# Patient Record
Sex: MALE | Race: BLACK OR AFRICAN AMERICAN | NOT HISPANIC OR LATINO | Employment: FULL TIME | URBAN - METROPOLITAN AREA
[De-identification: names, ages, dates, MRNs, and addresses within clinical notes are randomized per-mention and may not be internally consistent; named-entity substitution may affect disease eponyms.]

---

## 2017-01-31 ENCOUNTER — OFFICE VISIT (OUTPATIENT)
Dept: FAMILY MEDICINE | Facility: CLINIC | Age: 47
End: 2017-01-31

## 2017-01-31 VITALS
HEART RATE: 94 BPM | WEIGHT: 166 LBS | RESPIRATION RATE: 18 BRPM | SYSTOLIC BLOOD PRESSURE: 134 MMHG | BODY MASS INDEX: 23.16 KG/M2 | DIASTOLIC BLOOD PRESSURE: 88 MMHG

## 2017-01-31 DIAGNOSIS — R21 RASH AND NONSPECIFIC SKIN ERUPTION: ICD-10-CM

## 2017-01-31 DIAGNOSIS — E11.9 DM TYPE 2, GOAL HBA1C 7%-8% (H): ICD-10-CM

## 2017-01-31 DIAGNOSIS — L08.9 LOCAL INFECTION OF SKIN AND SUBCUTANEOUS TISSUE: ICD-10-CM

## 2017-01-31 DIAGNOSIS — E11.9 DM TYPE 2, GOAL HBA1C 7%-8% (H): Primary | ICD-10-CM

## 2017-01-31 LAB
ALBUMIN SERPL-MCNC: 3.4 G/DL (ref 3.4–5)
ALP SERPL-CCNC: 99 U/L (ref 40–150)
ALT SERPL W P-5'-P-CCNC: 18 U/L (ref 0–70)
ANION GAP SERPL CALCULATED.3IONS-SCNC: 8 MMOL/L (ref 3–14)
AST SERPL W P-5'-P-CCNC: 10 U/L (ref 0–45)
BASOPHILS # BLD AUTO: 0.1 10E9/L (ref 0–0.2)
BASOPHILS NFR BLD AUTO: 0.7 %
BILIRUB SERPL-MCNC: 0.5 MG/DL (ref 0.2–1.3)
BUN SERPL-MCNC: 15 MG/DL (ref 7–30)
CALCIUM SERPL-MCNC: 8.6 MG/DL (ref 8.5–10.1)
CHLORIDE SERPL-SCNC: 104 MMOL/L (ref 94–109)
CHOLEST SERPL-MCNC: 133 MG/DL
CO2 SERPL-SCNC: 26 MMOL/L (ref 20–32)
CREAT SERPL-MCNC: 0.83 MG/DL (ref 0.66–1.25)
CREAT UR-MCNC: 84 MG/DL
DIFFERENTIAL METHOD BLD: NORMAL
EOSINOPHIL # BLD AUTO: 0.1 10E9/L (ref 0–0.7)
EOSINOPHIL NFR BLD AUTO: 1.1 %
ERYTHROCYTE [DISTWIDTH] IN BLOOD BY AUTOMATED COUNT: 11.7 % (ref 10–15)
GFR SERPL CREATININE-BSD FRML MDRD: ABNORMAL ML/MIN/1.7M2
GLUCOSE SERPL-MCNC: 347 MG/DL (ref 70–99)
HBA1C MFR BLD: 10.2 % (ref 4.3–6)
HCT VFR BLD AUTO: 46.8 % (ref 40–53)
HDLC SERPL-MCNC: 34 MG/DL
HGB BLD-MCNC: 16.1 G/DL (ref 13.3–17.7)
IMM GRANULOCYTES # BLD: 0 10E9/L (ref 0–0.4)
IMM GRANULOCYTES NFR BLD: 0.2 %
LDLC SERPL CALC-MCNC: 68 MG/DL
LYMPHOCYTES # BLD AUTO: 1.8 10E9/L (ref 0.8–5.3)
LYMPHOCYTES NFR BLD AUTO: 20.2 %
MCH RBC QN AUTO: 30.8 PG (ref 26.5–33)
MCHC RBC AUTO-ENTMCNC: 34.4 G/DL (ref 31.5–36.5)
MCV RBC AUTO: 90 FL (ref 78–100)
MICROALBUMIN UR-MCNC: <5 MG/L
MICROALBUMIN/CREAT UR: NORMAL MG/G CR (ref 0–17)
MONOCYTES # BLD AUTO: 0.8 10E9/L (ref 0–1.3)
MONOCYTES NFR BLD AUTO: 9.2 %
NEUTROPHILS # BLD AUTO: 6.1 10E9/L (ref 1.6–8.3)
NEUTROPHILS NFR BLD AUTO: 68.6 %
NONHDLC SERPL-MCNC: 99 MG/DL
NRBC # BLD AUTO: 0 10*3/UL
NRBC BLD AUTO-RTO: 0 /100
PLATELET # BLD AUTO: 275 10E9/L (ref 150–450)
POTASSIUM SERPL-SCNC: 4 MMOL/L (ref 3.4–5.3)
PROT SERPL-MCNC: 7.4 G/DL (ref 6.8–8.8)
RBC # BLD AUTO: 5.22 10E12/L (ref 4.4–5.9)
SODIUM SERPL-SCNC: 138 MMOL/L (ref 133–144)
TRIGL SERPL-MCNC: 152 MG/DL
WBC # BLD AUTO: 8.9 10E9/L (ref 4–11)

## 2017-01-31 RX ORDER — CEPHALEXIN 500 MG/1
500 CAPSULE ORAL 3 TIMES DAILY
Qty: 30 CAPSULE | Refills: 0 | Status: SHIPPED | OUTPATIENT
Start: 2017-01-31 | End: 2017-02-28

## 2017-01-31 RX ORDER — CLOTRIMAZOLE AND BETAMETHASONE DIPROPIONATE 10; .64 MG/G; MG/G
CREAM TOPICAL 2 TIMES DAILY
Qty: 45 G | Refills: 1 | Status: SHIPPED | OUTPATIENT
Start: 2017-01-31 | End: 2020-12-29

## 2017-01-31 ASSESSMENT — PAIN SCALES - GENERAL: PAINLEVEL: NO PAIN (0)

## 2017-01-31 NOTE — MR AVS SNAPSHOT
After Visit Summary   1/31/2017    Otto Hughes    MRN: [id number]           Patient Information     Date Of Birth          1970        Visit Information        Provider Department      1/31/2017 9:30 AM Antelmo Carrera MD [facility]        Today's Diagnoses     DM type 2, goal HbA1c 7%-8% (H)    -  1     Local infection of skin and subcutaneous tissue         Rash and nonspecific skin eruption           Care Instructions    Primary Care Center Medication Refill Request Information:  * Please contact your pharmacy regarding ANY request for medication refills.  ** [facility] Prescription Fax = [phone]  * Please allow 3 business days for routine medication refills.  * Please allow 5 business days for controlled substance medication refills.     Primary Care Center Test Result notification information:  *You will be notified with in 7-10 days of your appointment day regarding the results of your test.  If you are on MyChart you will be notified as soon as the provider has reviewed the results and signed off on them.          Follow-ups after your visit        Additional Services     DERMATOLOGY REFERRAL       Your provider has referred you to: [facility] [place]: Dermatology Clinic [facility] [phone]   [email or web address]    Please be aware that coverage of these services is subject to the terms and limitations of your health insurance plan.  Call member services at your health plan with any benefit or coverage questions.      Please bring the following with you to your appointment:    (1) Any X-Rays, CTs or MRIs which have been performed.  Contact the facility where they were done to arrange for  prior to your scheduled appointment.    (2) List of current medications  (3) This referral request   (4) Any documents/labs given to you for this referral                  Your next 10 appointments already scheduled     Jan 31, 2017 10:30 AM   LAB  with  LAB   [facility] ([facility])    [address] [place] [address] [phone]           Patient must bring picture ID.  Patient should be prepared to give a urine specimen  Please do not eat 10-12 hours before your appointment if you are coming in fasting for labs on lipids, cholesterol, or glucose (sugar).  Pregnant women should follow their Care Team instructions. Water with medications is okay. Do not drink coffee or other fluids.   If you have concerns about taking  your medications, please ask at office or if scheduling via Cortex[employer], send a message by clicking on Secure Messaging, Message Your Care Team.            Feb 28, 2017  8:45 AM   (Arrive by 8:30 AM)   New Patient Visit with Sujit Kerr MD   [facility] Dermatology ([facility])    [address] [place] [address] [phone]              Future tests that were ordered for you today     Open Future Orders        Priority Expected Expires Ordered    HEMOGLOBIN A1C -(Today) Routine 1/31/2017 1/31/2018 1/31/2017    CBC with platelets differential Routine  1/31/2018 1/31/2017    Comprehensive metabolic panel Routine  1/31/2018 1/31/2017    Lipid panel reflex to direct LDL Routine  1/31/2018 1/31/2017            Who to contact     Please call your clinic at [phone] to:    Ask questions about your health    Make or cancel appointments    Discuss your medicines    Learn about your test results    Speak to your doctor   If you have compliments or concerns about an experience at your clinic, or if you wish to file a complaint, please contact [facility] Patient Relations at [phone] or email us at [email or web address]         Additional Information About Your Visit        [email or web address] Information     Cortexyme is an electronic gateway that provides easy, online access to your medical records. With Cortexyme,  you can request a clinic appointment, read your test results, renew a prescription or communicate with your care team.     To sign up for [employer] visit the website at [email or web address]   You will be asked to enter the access code listed below, as well as some personal information. Please follow the directions to create your username and password.     Your access code is: [email or web address]  Expires: 2017  6:30 AM     Your access code will  in 90 days. If you need help or a new code, please contact your [facility] or call [phone] for assistance.        Care EveryWhere ID     This is your Care EveryWhere ID. This could be used by other organizations to access your [place] medical records  [id number]        Your Vitals Were     Pulse Respirations                94 18           Blood Pressure from Last 3 Encounters:   17 134/88   16 135/82   11/17/15 114/71    Weight from Last 3 Encounters:   17 75.297 kg (166 lb)   16 81.511 kg (179 lb 11.2 oz)   11/17/15 80.513 kg (177 lb 8 oz)              We Performed the Following     Albumin Random Urine Quantitative     DERMATOLOGY REFERRAL          Today's Medication Changes          These changes are accurate as of: 17 10:22 AM.  If you have any questions, ask your nurse or doctor.               Start taking these medicines.        Dose/Directions    cephALEXin 500 MG capsule   Commonly known as:  KEFLEX   Used for:  Local infection of skin and subcutaneous tissue   Started by:  Antelmo Carrera MD        Dose:  500 mg   Take 1 capsule (500 mg) by mouth 3 times daily   Quantity:  30 capsule   Refills:  0       clotrimazole-betamethasone cream   Commonly known as:  LOTRISONE   Used for:  Rash and nonspecific skin eruption   Started by:  Antelmo Carrera MD        Apply topically 2 times daily Apply to rash behind knees   Quantity:  45 g   Refills:  1            Where to get your medicines       These medications were sent to [facility] - [place], [place] - [address]  [address] [facility] [address]     Phone:  [phone]    - cephALEXin 500 MG capsule  - clotrimazole-betamethasone cream             Primary Care Provider Office Phone # Fax #    Antelmo Carrera [phone]       PRIMARY CARE CENTER [address] [facility] [address]  [facility] [address]        Thank you!     Thank you for choosing [facility]  for your care. Our goal is always to provide you with excellent care. Hearing back from our patients is one way we can continue to improve our services. Please take a few minutes to complete the written survey that you may receive in the mail after your visit with us. Thank you!             Your Updated Medication List - Protect others around you: Learn how to safely use, store and throw away your medicines at www.disposemymeds.org.          This list is accurate as of: 1/31/17 10:22 AM.  Always use your most recent med list.                   Brand Name Dispense Instructions for use    ASPIRIN CHILDRENS 81 MG chewable tablet   Generic drug:  aspirin      Take 81 mg by mouth daily.       blood glucose monitoring lancets     1 Box    1 each by In Vitro route 3 times daily Use as directed       blood glucose monitoring meter device kit     1 kit    Use to test blood sugars daily as directed.       blood glucose monitoring test strip    no brand specified    100 each    Test blood 2-3 times daily.       cephALEXin 500 MG capsule    KEFLEX    30 capsule    Take 1 capsule (500 mg) by mouth 3 times daily       clotrimazole-betamethasone cream    LOTRISONE    45 g    Apply topically 2 times daily Apply to rash behind knees       insulin glargine 100 UNIT/ML injection    LANTUS    1 Month    Inject 10 Units Subcutaneous At Bedtime       insulin pen needle 31G X 8 MM     30 each    Use daily to inject Lantus. Please make ANNUAL exam with PRIMARY  provider for continued refills.       metFORMIN 500 MG 24 hr tablet    GLUCOPHAGE-XR    120 tablet    Take 4 tablets (2,000 mg) by mouth daily (with dinner)       sildenafil 100 MG cap/tab    VIAGRA    4 tablet    Take 0.5-1 tablets ( mg) by mouth daily as needed for erectile dysfunction Take 30 min to 4 hours before intercourse.  Never use with nitroglycerin, terazosin or doxazosin.       simvastatin 20 MG tablet    ZOCOR    90 tablet    Take 1 tablet (20 mg) by mouth At Bedtime . Patient needs to see primary provider and have labs for further refills.       tadalafil 20 MG tablet    CIALIS    12 tablet    Take 1 tablet (20 mg) by mouth daily as needed for erectile dysfunction

## 2017-01-31 NOTE — NURSING NOTE
Chief Complaint   Patient presents with     Rash     Patient is here for ongoing rash, duration 1 month     Sintia Burns CMA 9:46 AM on 1/31/2017.

## 2017-01-31 NOTE — Clinical Note
Patient:  Otto Hughes  :   1970  MRN:     [id number]        Mr. Otto Hughes  [address] [address]        2017    Dear Mr. Hughes,    Thank you for choosing the [facility] for your healthcare needs.  We appreciate the opportunity to serve you.    The following are your recent test results.     Labs show sugar worse; after see derm, see me back to discuss after.    Results for orders placed or performed in visit on 17   Albumin Random Urine Quantitative   Result Value Ref Range    Creatinine Urine 84 mg/dL    Albumin Urine mg/L <5 mg/L    Albumin Urine mg/g Cr Unable to calculate due to low value 0 - 17 mg/g Cr     Please contact your provider if you have any questions or concerns.  We look forward to serving your needs in the future.      Sincerely,    Dr. Carrera/brooke

## 2017-01-31 NOTE — PROGRESS NOTES
SUBJECTIVE:    Pt is a 46 year old male with pmh of     Patient Active Problem List   Diagnosis     Hyperlipidemia     T2DM (type 2 diabetes mellitus) (H)       who is here for evaluation of had concerns including Rash.    Here for a few things.  One, two types of rash, new in last mo or so.  1--upper thighs both legs red tender raised spots no drg no trauma  2--behind both knees itchy flaky red patch not warm/tender/draining or raised  Also notes darkening of groin skin    Two, DM, overdue for f/u. Does mention no insulin rxn/passing out as will need forms to drive soon.  Weight is down, he thinks did try to eat healthier but not much exercise.  Will need visit soon to focus on this we agree, last one last March so we agree March, then discuss feet/eye/dentist too    No Known Allergies        Current Outpatient Prescriptions   Medication Sig Dispense Refill     cephALEXin (KEFLEX) 500 MG capsule Take 1 capsule (500 mg) by mouth 3 times daily 30 capsule 0     clotrimazole-betamethasone (LOTRISONE) cream Apply topically 2 times daily Apply to rash behind knees 45 g 1     simvastatin (ZOCOR) 20 MG tablet Take 1 tablet (20 mg) by mouth At Bedtime . Patient needs to see primary provider and have labs for further refills. 90 tablet 0     blood glucose monitoring (NO BRAND SPECIFIED) test strip Test blood 2-3 times daily. 100 each 5     metFORMIN (GLUCOPHAGE-XR) 500 MG 24 hr tablet Take 4 tablets (2,000 mg) by mouth daily (with dinner) 120 tablet 5     blood glucose monitoring (FREESTYLE) lancets 1 each by In Vitro route 3 times daily Use as directed 1 Box 5     sildenafil (VIAGRA) 100 MG tablet Take 0.5-1 tablets ( mg) by mouth daily as needed for erectile dysfunction Take 30 min to 4 hours before intercourse.  Never use with nitroglycerin, terazosin or doxazosin. 4 tablet 6     tadalafil (CIALIS) 20 MG tablet Take 1 tablet (20 mg) by mouth daily as needed for erectile dysfunction 12 tablet 11     blood glucose  monitoring (FREESTYLE FREEDOM LITE) meter device kit Use to test blood sugars daily as directed. 1 kit 0     insulin glargine (LANTUS) 100 UNIT/ML vial Inject 10 Units Subcutaneous At Bedtime 1 Month 3     insulin pen needle 31G X 8 MM Use daily to inject Lantus. Please make ANNUAL exam with PRIMARY provider for continued refills. 30 each 5     aspirin (ASPIRIN CHILDRENS) 81 MG chewable tablet Take 81 mg by mouth daily.         Social History   Substance Use Topics     Smoking status: Former Smoker -- 0.50 packs/day for 17 years     Quit date: 01/01/2008     Smokeless tobacco: Never Used     Alcohol Use: No             OBJECTIVE:  /88 mmHg  Pulse 94  Resp 18  Wt 75.297 kg (166 lb)  GENERAL APPEARANCE: Alert, no acute distress  SKIN:   1--bilat dark skin both groins  2--upper bilat thighs four total one inch round red slight raised mild tender not warm circular lesions not fluctuant no open areas he states he tried to drain but could not   3--behind both knees four inch red flat flaky patch not warm/swollen/open/draining or tender  NEURO: Alert, oriented, speech and mentation normal  PSYCHE: mentation appears normal, affect and mood normal    ASSESSMENT/PLAN:    Upper thigh lesions likely folliculitis  Keflex if not better try doxy  Nothing to drain    Groin skin c/w acanthosis    DM: labs    Knee rash: likely fungus vs eczema  Lotrisone    See derm for opinion--all skin issues he just noted earlier this winter, new, no contacts    See me back in March recheck wt then wt loss could be from eating a bit less but check full lab set for today    MEAGHAN RUELAS MD

## 2017-01-31 NOTE — PATIENT INSTRUCTIONS
Primary Care Center Medication Refill Request Information:  * Please contact your pharmacy regarding ANY request for medication refills.  ** [facility] Prescription Fax = [phone]  * Please allow 3 business days for routine medication refills.  * Please allow 5 business days for controlled substance medication refills.     Primary Care Center Test Result notification information:  *You will be notified with in 7-10 days of your appointment day regarding the results of your test.  If you are on MyChart you will be notified as soon as the provider has reviewed the results and signed off on them.

## 2017-02-02 ENCOUNTER — TELEPHONE (OUTPATIENT)
Dept: INTERNAL MEDICINE | Facility: CLINIC | Age: 47
End: 2017-02-02

## 2017-02-02 NOTE — TELEPHONE ENCOUNTER
Left a detailed message to cell phone regarding the result and recommendations.  --------------------------------------------      ----- Message from Antelmo Carrera MD sent at 1/31/2017 12:21 PM CST -----  Let him know sugar worse; after sees derm, see me back to disccuss

## 2017-02-06 ENCOUNTER — TELEPHONE (OUTPATIENT)
Dept: INTERNAL MEDICINE | Facility: CLINIC | Age: 47
End: 2017-02-06

## 2017-02-10 ENCOUNTER — TELEPHONE (OUTPATIENT)
Dept: INTERNAL MEDICINE | Facility: CLINIC | Age: 47
End: 2017-02-10

## 2017-02-10 DIAGNOSIS — E11.9 DIABETES MELLITUS, TYPE 2 (H): Primary | ICD-10-CM

## 2017-02-10 NOTE — TELEPHONE ENCOUNTER
Spoke with pharmacy, states that pt picked up Accucheck Meliza glucometer and test strips on 1/6, pt cannot have the new one which won't be covered by the insurance. He needs to call the phone number on the box for replacement. Left a detailed message to the pt regarding this matter.  -------------------------------------------------        ----- Message from Boaz Trevizo sent at 2/10/2017 10:30 AM CST -----  Regarding: Dr Carrera - pt req. a call back re: test strips and meter not working  Contact: [phone]  Pt has not been able to check his blood glucose level due to his meter being broken for the past 3-4 days, pt stated the test strips are no longer covered under ins and is req a call back re: both issues. Pt can be reached at [phone] thanks

## 2017-02-10 NOTE — TELEPHONE ENCOUNTER
Pt called back, states that his can keep the old glucometer (free style), needs test strip for that glucometer.  Free style test strips were sent to the pharmacy.

## 2017-02-28 ENCOUNTER — TELEPHONE (OUTPATIENT)
Dept: PHARMACY | Facility: OTHER | Age: 47
End: 2017-02-28

## 2017-02-28 ENCOUNTER — OFFICE VISIT (OUTPATIENT)
Dept: DERMATOLOGY | Facility: CLINIC | Age: 47
End: 2017-02-28

## 2017-02-28 ENCOUNTER — OFFICE VISIT (OUTPATIENT)
Dept: FAMILY MEDICINE | Facility: CLINIC | Age: 47
End: 2017-02-28

## 2017-02-28 VITALS
SYSTOLIC BLOOD PRESSURE: 111 MMHG | DIASTOLIC BLOOD PRESSURE: 77 MMHG | WEIGHT: 164.7 LBS | BODY MASS INDEX: 22.97 KG/M2 | HEART RATE: 82 BPM

## 2017-02-28 DIAGNOSIS — E11.9 DM TYPE 2, GOAL HBA1C 7%-8% (H): Primary | ICD-10-CM

## 2017-02-28 DIAGNOSIS — L85.8 KP (KERATOSIS PILARIS): ICD-10-CM

## 2017-02-28 DIAGNOSIS — Z13.89 SCREENING FOR DIABETIC PERIPHERAL NEUROPATHY: ICD-10-CM

## 2017-02-28 DIAGNOSIS — L83 ACANTHOSIS NIGRICANS: ICD-10-CM

## 2017-02-28 DIAGNOSIS — L28.1 PRURIGO NODULARIS: ICD-10-CM

## 2017-02-28 DIAGNOSIS — Z13.5 SCREENING FOR DIABETIC RETINOPATHY: ICD-10-CM

## 2017-02-28 DIAGNOSIS — L20.81 ATOPIC NEURODERMATITIS: Primary | ICD-10-CM

## 2017-02-28 RX ORDER — CLOBETASOL PROPIONATE 0.5 MG/G
OINTMENT TOPICAL 2 TIMES DAILY
Qty: 60 G | Refills: 3 | Status: SHIPPED | OUTPATIENT
Start: 2017-02-28 | End: 2020-12-29

## 2017-02-28 RX ORDER — TRIAMCINOLONE ACETONIDE 1 MG/G
OINTMENT TOPICAL 2 TIMES DAILY
Qty: 454 G | Refills: 1 | Status: SHIPPED | OUTPATIENT
Start: 2017-02-28 | End: 2020-12-29

## 2017-02-28 ASSESSMENT — PAIN SCALES - GENERAL
PAINLEVEL: NO PAIN (0)
PAINLEVEL: NO PAIN (0)

## 2017-02-28 NOTE — NURSING NOTE
Dermatology Rooming Note    Otto Hughes's goals for this visit include:   Chief Complaint   Patient presents with     Derm Problem     Rash on legs. Otto was prescribed an oral antibiotic and also clotrimazole-betamethasone which helps, but the rash is still present.     Neela Rodriguez, CMA

## 2017-02-28 NOTE — MR AVS SNAPSHOT
After Visit Summary   2/28/2017    Otto Hughes    MRN: [id number]           Patient Information     Date Of Birth          1970        Visit Information        Provider Department      2/28/2017 8:45 AM Sujit Kerr MD [facility] Dermatology        Today's Diagnoses     Atopic neurodermatitis    -  1    KP (keratosis pilaris)        Acanthosis nigricans        Prurigo nodularis          Care Instructions    Recommendations for dry skin and dermatitis   1. Bathe or shower daily in lukewarm water  2. Use a gentle non-soap detergent cleanser  - Soaps are alkaline (which can irritate sensitive skin) and remove natural moisturizing factors   - Recommended products, in no particular order, include:   - Bars:    - Aveeno Moisturizing Bar    - Cetaphil Gentle Cleansing Bar    - Dove Sensitive Skin Unscented Beauty Bar    - Olay Ultra Moisture Bar   - Liquid Cleansers:    - Aquanil Cleanser    - CeraVe Hydrating Cleanser    - Cetaphil Gentle Skin Cleanser  - Avoid scented soaps or bath additives unless your doctor tells you otherwise  - Focus on washing the face, underarms, and underwear areas; other sites usually do not need frequent washing  3. Rinse off thoroughly, then pat dry until skin is slightly damp  4. Apply moisturizer to damp skin within 3-5 minutes of exiting the bath/shower  - Recommended products, in no particular order, include:   - Lotions (thinner/lighter, but may be less effective)    - AmLactin Cerapeutic Restoring Body Lotion    - CeraVe Facial Moisturizing Lotion (AM and/or PM)    - Lubriderm Advanced Therapy Lotion   - Creams (thicker, likely the best balance of effectiveness and feel)    - AmLactin Ultra Hydrating Body Cream    - Aveeno Eczema Therapy Moisturizing Cream    - Aveeno Eczema Therapy Itch Relief Balm    - CeraVe Itch Relief Moisturizing Cream   - Ointments (thickest)    - Vaseline  5. If prescribed a topical steroid medication, this may be applied before or after  the moisturizer (whichever order you prefer)  6. Reapply moisturizer one or two additional times throughout the day when dry skin is present; once this improves, reduce to daily or every other day as needed to prevent recurrence  7. If dry skin or dermatitis is present on the hands, keep moisturizer near the sink and apply after washing and drying your hands  8. A humidifier may be helpful during the winter months (when ambient humidity is very low)          Follow-ups after your visit        Follow-up notes from your care team     Return in about 3 months (around 5/28/2017).      Your next 10 appointments already scheduled     Mar 07, 2017  9:00 AM CST   TELEMEDICINE with Sourav Mosley [facility] Medication Therapy Management ([facility])    [address] [place] [id number]              Note: this is not an onsite visit; there is no need to come to the facility.              Who to contact     Please call your clinic at [phone] to:    Ask questions about your health    Make or cancel appointments    Discuss your medicines    Learn about your test results    Speak to your doctor   If you have compliments or concerns about an experience at your clinic, or if you wish to file a complaint, please contact [facility] Patient Relations at [phone] or email us at [email or web address]         Additional Information About Your Visit        Care EveryWhere ID     This is your Care EveryWhere ID. This could be used by other organizations to access your [place] medical records  [id number]         Blood Pressure from Last 3 Encounters:   02/28/17 111/77   01/31/17 134/88   03/03/16 135/82    Weight from Last 3 Encounters:   02/28/17 74.7 kg (164 lb 11.2 oz)   01/31/17 75.3 kg (166 lb)   03/03/16 81.5 kg (179 lb 11.2 oz)              Today, you had the following     No orders found for display         Today's  Medication Changes          These changes are accurate as of: 2/28/17 11:59 PM.  If you have any questions, ask your nurse or doctor.               Start taking these medicines.        Dose/Directions    clobetasol 0.05 % ointment   Commonly known as:  TEMOVATE   Used for:  Prurigo nodularis   Started by:  Sujit Kerr MD        Apply topically 2 times daily Apply to nodules that have been picked at and cover with bandaid. Avoid face, underarms, and groin folds.   Quantity:  60 g   Refills:  3       sitagliptin 50 MG tablet   Commonly known as:  JANUVIA   Used for:  DM type 2, goal HbA1c 7%-8% (H)   Started by:  Antelmo Carrera MD        Dose:  50 mg   Take 1 tablet (50 mg) by mouth daily   Quantity:  90 tablet   Refills:  3       triamcinolone 0.1 % ointment   Commonly known as:  KENALOG   Used for:  Atopic neurodermatitis   Started by:  Sujit Kerr MD        Apply topically 2 times daily To sites of dry, red, itchy skin.   Quantity:  454 g   Refills:  1            Where to get your medicines      These medications were sent to [facility] [place], [place] - [address] [facility]  [address] [id number]     Phone:  [phone]     clobetasol 0.05 % ointment    sitagliptin 50 MG tablet    triamcinolone 0.1 % ointment                Primary Care Provider Office Phone # Fax #    Antelmo Carrera [phone]       PRIMARY CARE CENTER [address] [place] [address]        Thank you!     Thank you for choosing [facility] DERMATOLOGY  for your care. Our goal is always to provide you with excellent care. Hearing back from our patients is one way we can continue to improve our services. Please take a few minutes to complete the written survey that you may receive in the mail after your visit with us. Thank you!             Your Updated Medication List - Protect others around you: Learn how to safely use, store and throw away your medicines  at www.disposemymeds.org.          This list is accurate as of: 2/28/17 11:59 PM.  Always use your most recent med list.                   Brand Name Dispense Instructions for use    ASPIRIN CHILDRENS 81 MG chewable tablet   Generic drug:  aspirin      Take 81 mg by mouth daily.       blood glucose monitoring lancets     1 Box    1 each by In Vitro route 3 times daily Use as directed       blood glucose monitoring meter device kit     1 kit    Use to test blood sugars daily as directed.       blood glucose monitoring test strip    FREESTYLE LITE    100 strip    Use to test blood sugars 3 times daily or as directed.       clobetasol 0.05 % ointment    TEMOVATE    60 g    Apply topically 2 times daily Apply to nodules that have been picked at and cover with bandaid. Avoid face, underarms, and groin folds.       clotrimazole-betamethasone cream    LOTRISONE    45 g    Apply topically 2 times daily Apply to rash behind knees       insulin glargine 100 UNIT/ML injection    LANTUS    1 Month    Inject 10 Units Subcutaneous At Bedtime       insulin pen needle 31G X 8 MM     30 each    Use daily to inject Lantus. Please make ANNUAL exam with PRIMARY provider for continued refills.       metFORMIN 500 MG 24 hr tablet    GLUCOPHAGE-XR    120 tablet    Take 4 tablets (2,000 mg) by mouth daily (with dinner)       sildenafil 100 MG cap/tab    VIAGRA    4 tablet    Take 0.5-1 tablets ( mg) by mouth daily as needed for erectile dysfunction Take 30 min to 4 hours before intercourse.  Never use with nitroglycerin, terazosin or doxazosin.       simvastatin 20 MG tablet    ZOCOR    90 tablet    Take 1 tablet (20 mg) by mouth At Bedtime . Patient needs to see primary provider and have labs for further refills.       sitagliptin 50 MG tablet    JANUVIA    90 tablet    Take 1 tablet (50 mg) by mouth daily       tadalafil 20 MG tablet    CIALIS    12 tablet    Take 1 tablet (20 mg) by mouth daily as needed for erectile dysfunction        triamcinolone 0.1 % ointment    KENALOG    454 g    Apply topically 2 times daily To sites of dry, red, itchy skin.

## 2017-02-28 NOTE — PROGRESS NOTES
"[facility] Dermatology Note      Dermatology Problem List:  1.  Atopic dermatitis (antecubital fossae)  -Triamcinolone ointment  2.  Prurigo nodularis  -Clobetasol ointment  3. Acanthosis nigricans  -DMII managed by PCP  4. Keratosis pilaris  -Gentle skin care    Encounter Date: Feb 28, 2017    CC:   Chief Complaint   Patient presents with     Derm Problem     Rash on legs. Otto was prescribed an oral antibiotic and also clotrimazole-betamethasone which helps, but the rash is still present.         History of Present Illness:  This 46 year old diabetic male presents as a referral from Dr. Antelmo Carrera (internal medicine; primary care) for evaluation of a skin eruption to the legs.     Mr. Hughes's skin eruption began behind his knees as a rash but then spread to his upper thighs/ groin in early winter. He reports significant associated pruritus. Initially self-treated with \"diabetes\" lotion bought at [employer], which did improve his rash.    He denies tenderness to lesions on the upper inner thighs. He denies every having a rash to his elbows. He denies a history of eruption to his axillae.    At his last visit with primary care on 01/31/2017, his physician described the eruption as \"red tender raised spots\" to the upper thighs and \"itchy flaky red patch\" behind both knees.     Past interventions include 10-day course of cephalexin 500 mg three times daily, which provided reportedly no improvement and clotrimazole-betamethasone cream which did improve his rashes. He used this Lotrisone most recently \"the day before yesterday.\"    The patient is otherwise feeling well. There are no other skin concerns at this time.    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    Family History:  No family history of melanoma or other skin cancer. Mom had rashes.    Medications:  Current Outpatient Prescriptions   Medication Sig Dispense Refill     blood glucose monitoring (FREESTYLE LITE) test strip Use to test blood sugars 3 times daily or as directed. 100 strip 11     clotrimazole-betamethasone (LOTRISONE) cream Apply topically 2 times daily Apply to rash behind knees 45 g 1     simvastatin (ZOCOR) 20 MG tablet Take 1 tablet (20 mg) by mouth At Bedtime . Patient needs to see primary provider and have labs for further refills. 90 tablet 0     metFORMIN (GLUCOPHAGE-XR) 500 MG 24 hr tablet Take 4 tablets (2,000 mg) by mouth daily (with dinner) 120 tablet 5     blood glucose monitoring (FREESTYLE) lancets 1 each by In Vitro route 3 times daily Use as directed 1 Box 5     sildenafil (VIAGRA) 100 MG tablet Take 0.5-1 tablets ( mg) by mouth daily as needed for erectile dysfunction Take 30 min to 4 hours before intercourse.  Never use with nitroglycerin, terazosin or doxazosin. 4 tablet 6     tadalafil (CIALIS) 20 MG tablet Take 1 tablet (20 mg) by mouth daily as needed for erectile dysfunction 12 tablet 11     blood glucose monitoring (FREESTYLE FREEDOM LITE) meter device kit Use to test blood sugars daily as directed. 1 kit 0     insulin glargine (LANTUS) 100 UNIT/ML vial Inject 10 Units Subcutaneous At Bedtime 1 Month 3     insulin pen needle 31G X 8 MM Use daily to inject Lantus. Please make ANNUAL exam with PRIMARY provider for continued refills. 30 each 5     aspirin (ASPIRIN CHILDRENS) 81 MG chewable tablet Take 81 mg by mouth daily.          No Known Allergies      Review of Systems:  -As per HPI  -Constitutional: The patient is generally feeling well.    Physical exam:  There were no vitals taken for this visit.  -GEN: This is a well  "developed, well-nourished male in no acute distress, in a pleasant mood.    NEURO: Alert and oriented  PSYCH: in a pleasant mood, appropriate affect  -SKIN: Total skin excluding the undergarment areas was performed. The exam included the head/face, neck, both arms, chest, back, abdomen, both legs, digits and/or nails.   -Hyperpigmented and slightly reticulated patches on the upper back suggestive of rubbing  -Mild lichenification of the gluteal cleft.  -Examination of the bilateral lower extremities reveals roughly 6 partially-resolving inflammatory nodules, 3 on each anterior leg, with violaceous and hyperpigmented overlying skin and central excoriations and focal hemorraghic crusts.  -Anterior thighs: 1-2 mm hyperkeratotic spines.  -Bilatearl popliteal fossae with thin hyperpigmented macules with mild lichenification and eccentuation os skin lines  -Multiple hyperpigmented patches on the biltearl lower extremities. Focal patches of xerotic dermatitis throughout the lower legs.  -No other lesions of concern on areas examined.     Impression/Plan:  1. Flexural atopic dermatitis:    Recommend gentle skin care.    Dry skin maintenance dispensed in written format. Recommend creams as opposed to lotions.    Recommend dilute bleach baths twice weekly.    Start triamcinolone 0.1% ointment. This medication is to be used as a \"work horse\" to most areas; clobetasol can be used to persistent areas    STOP Lotrisone.     2. Prurigo nodularis:    Start clobetasol ointment. Apply topically sparingly to nodules on upper inner thighs under occlusion of Band-Aid until resolved. Avoid contact with face, underarms, and groin folds.    Will send betamethasone ointment if clobetasol is not covered by insurance.    3. Keratosis pilaris to the anterior thighs. This normal skin finding may drive much of his perifollicular inflammation if he is picking his spines:    Otto was emphatically recommended not to pick his spines.    Itch and " texture may be improved by moisturizie and dry skin care (as above)    4. Likely early developing acanthosis nigricans to the upper back:    Diabetic etiology reviewed with patient. Can be treated by controlling diabetes.    Follow-up in 3 months.      Staff Involved:  Scribe/Staff  I, Brent Arambula, am serving as a scribe to document services personally performed by Dr. Sujit Kerr MD, based on data collection and the provider's statements to me.     Staff attestation:  The documentation recorded by the scribe accurately reflects the services I personally performed and the decisions I personally made.    Sujit Kerr MD  Staff Dermatologist    Department of Dermatology

## 2017-02-28 NOTE — NURSING NOTE
Chief Complaint   Patient presents with     Derm Problem     Patient here for derm follow up.      Natasha Lopez CMA at 10:15 AM on 2/28/2017.

## 2017-02-28 NOTE — LETTER
"2/28/2017       RE: Otto Hughes  [address] [id number]     Dear Colleague,    Thank you for referring your patient, Otto Hughes, to the [facility] DERMATOLOGY at [facility]. Please see a copy of my visit note below.    [facility] Dermatology Note      Dermatology Problem List:  1.  Atopic dermatitis (antecubital fossae)  -Triamcinolone ointment  2.  Prurigo nodularis  -Clobetasol ointment  3. Acanthosis nigricans  -DMII managed by PCP  4. Keratosis pilaris  -Gentle skin care    Encounter Date: Feb 28, 2017    CC:   Chief Complaint   Patient presents with     Derm Problem     Rash on legs. Otto was prescribed an oral antibiotic and also clotrimazole-betamethasone which helps, but the rash is still present.         History of Present Illness:  This 46 year old diabetic male presents as a referral from Dr. Antelmo Carrera (internal medicine; primary care) for evaluation of a skin eruption to the legs.     Mr. Hughes's skin eruption began behind his knees as a rash but then spread to his upper thighs/ groin in early winter. He reports significant associated pruritus. Initially self-treated with \"diabetes\" lotion bought at [facility], which did improve his rash.    He denies tenderness to lesions on the upper inner thighs. He denies every having a rash to his elbows. He denies a history of eruption to his axillae.    At his last visit with primary care on 01/31/2017, his physician described the eruption as \"red tender raised spots\" to the upper thighs and \"itchy flaky red patch\" behind both knees.     Past interventions include 10-day course of cephalexin 500 mg three times daily, which provided reportedly no improvement and clotrimazole-betamethasone cream which did improve his rashes. He used this Lotrisone most recently \"the day before yesterday.\"    The patient is otherwise feeling well. There are no other skin concerns at this " time.    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    Family History:  No family history of melanoma or other skin cancer. Mom had rashes.    Medications:  Current Outpatient Prescriptions   Medication Sig Dispense Refill     blood glucose monitoring (FREESTYLE LITE) test strip Use to test blood sugars 3 times daily or as directed. 100 strip 11     clotrimazole-betamethasone (LOTRISONE) cream Apply topically 2 times daily Apply to rash behind knees 45 g 1     simvastatin (ZOCOR) 20 MG tablet Take 1 tablet (20 mg) by mouth At Bedtime . Patient needs to see primary provider and have labs for further refills. 90 tablet 0     metFORMIN (GLUCOPHAGE-XR) 500 MG 24 hr tablet Take 4 tablets (2,000 mg) by mouth daily (with dinner) 120 tablet 5     blood glucose monitoring (FREESTYLE) lancets 1 each by In Vitro route 3 times daily Use as directed 1 Box 5     sildenafil (VIAGRA) 100 MG tablet Take 0.5-1 tablets ( mg) by mouth daily as needed for erectile dysfunction Take 30 min to 4 hours before intercourse.  Never use with nitroglycerin, terazosin or doxazosin. 4 tablet 6     tadalafil (CIALIS) 20 MG tablet Take 1 tablet (20 mg) by mouth daily as needed for erectile dysfunction 12 tablet 11     blood glucose monitoring (FREESTYLE FREEDOM LITE) meter device kit Use to test blood sugars daily as directed. 1 kit 0     insulin glargine (LANTUS) 100 UNIT/ML vial Inject 10 Units Subcutaneous At Bedtime 1 Month 3     insulin pen needle 31G X 8 MM Use daily to inject Lantus. Please make ANNUAL exam with PRIMARY provider for continued refills. 30 each 5  "    aspirin (ASPIRIN CHILDRENS) 81 MG chewable tablet Take 81 mg by mouth daily.          No Known Allergies      Review of Systems:  -As per HPI  -Constitutional: The patient is generally feeling well.    Physical exam:  There were no vitals taken for this visit.  -GEN: This is a well developed, well-nourished male in no acute distress, in a pleasant mood.    NEURO: Alert and oriented  PSYCH: in a pleasant mood, appropriate affect  -SKIN: Total skin excluding the undergarment areas was performed. The exam included the head/face, neck, both arms, chest, back, abdomen, both legs, digits and/or nails.   -Hyperpigmented and slightly reticulated patches on the upper back suggestive of rubbing  -Mild lichenification of the gluteal cleft.  -Examination of the bilateral lower extremities reveals roughly 6 partially-resolving inflammatory nodules, 3 on each anterior leg, with violaceous and hyperpigmented overlying skin and central excoriations and focal hemorraghic crusts.  -Anterior thighs: 1-2 mm hyperkeratotic spines.  -Bilatearl popliteal fossae with thin hyperpigmented macules with mild lichenification and eccentuation os skin lines  -Multiple hyperpigmented patches on the biltearl lower extremities. Focal patches of xerotic dermatitis throughout the lower legs.  -No other lesions of concern on areas examined.     Impression/Plan:  1. Flexural atopic dermatitis:    Recommend gentle skin care.    Dry skin maintenance dispensed in written format. Recommend creams as opposed to lotions.    Recommend dilute bleach baths twice weekly.    Start triamcinolone 0.1% ointment. This medication is to be used as a \"work horse\" to most areas; clobetasol can be used to persistent areas    STOP Lotrisone.     2. Prurigo nodularis:    Start clobetasol ointment. Apply topically sparingly to nodules on upper inner thighs under occlusion of Band-Aid until resolved. Avoid contact with face, underarms, and groin folds.    Will send " betamethasone ointment if clobetasol is not covered by insurance.    3. Keratosis pilaris to the anterior thighs. This normal skin finding may drive much of his perifollicular inflammation if he is picking his spines:    Otto was emphatically recommended not to pick his spines.    Itch and texture may be improved by moisturizie and dry skin care (as above)    4. Likely early developing acanthosis nigricans to the upper back:    Diabetic etiology reviewed with patient. Can be treated by controlling diabetes.    Follow-up in 3 months.      Staff Involved:  Scribe/Staff  I, Brent Arambula, am serving as a scribe to document services personally performed by Dr. Sujit Kerr MD, based on data collection and the provider's statements to me.     Staff attestation:  The documentation recorded by the scribe accurately reflects the services I personally performed and the decisions I personally made.    Sujit Kerr MD  Staff Dermatologist    Department of Dermatology

## 2017-02-28 NOTE — PATIENT INSTRUCTIONS
[facility] Medication Refill Request Information:  * Please contact your pharmacy regarding ANY request for medication refills.  ** [facility] Prescription Fax = [phone]  * Please allow 3 business days for routine medication refills.  * Please allow 5 business days for controlled substance medication refills.     [facility] Test Result notification information:  *You will be notified with in 7-10 days of your appointment day regarding the results of your test.  If you are on MyChart you will be notified as soon as the provider has reviewed the results and signed off on them.

## 2017-02-28 NOTE — PROGRESS NOTES
SUBJECTIVE:    Pt is a 46 year old male with pmh of     Patient Active Problem List   Diagnosis     Hyperlipidemia     T2DM (type 2 diabetes mellitus) (H)       who is here for evaluation of had concerns including Derm Problem.    Here for DM f/u.  Meter not working. Sugars too high; I explained likely why weight down--so, as sugars improve, he needs to eat healthy as weight will want to go back up.   Due for eye MD, has dentist he sees.  On asa; not on ACE--no htn.  Recent labs rvwd. On high dose metformin, low dose Lantus. He really very much wants to get off insulin; discussed may not be able to but can give him trial--I explained at length may not work but he really wants to try. Discussed might take multiple meds such as Januvia, Actos, glipizide along w/ metformin. Consider Victoza. Also, he will see MTM to help facilitate meds.    Just went to derm, skin better, see that note.          Current Outpatient Prescriptions   Medication Sig Dispense Refill     triamcinolone (KENALOG) 0.1 % ointment Apply topically 2 times daily To sites of dry, red, itchy skin. 454 g 1     clobetasol (TEMOVATE) 0.05 % ointment Apply topically 2 times daily Apply to nodules that have been picked at and cover with bandaid. Avoid face, underarms, and groin folds. 60 g 3     sitagliptin (JANUVIA) 50 MG tablet Take 1 tablet (50 mg) by mouth daily 90 tablet 3     blood glucose monitoring (FREESTYLE LITE) test strip Use to test blood sugars 3 times daily or as directed. 100 strip 11     clotrimazole-betamethasone (LOTRISONE) cream Apply topically 2 times daily Apply to rash behind knees 45 g 1     simvastatin (ZOCOR) 20 MG tablet Take 1 tablet (20 mg) by mouth At Bedtime . Patient needs to see primary provider and have labs for further refills. 90 tablet 0     metFORMIN (GLUCOPHAGE-XR) 500 MG 24 hr tablet Take 4 tablets (2,000 mg) by mouth daily (with dinner) 120 tablet 5     blood glucose monitoring (FREESTYLE) lancets 1 each by In Vitro  route 3 times daily Use as directed 1 Box 5     sildenafil (VIAGRA) 100 MG tablet Take 0.5-1 tablets ( mg) by mouth daily as needed for erectile dysfunction Take 30 min to 4 hours before intercourse.  Never use with nitroglycerin, terazosin or doxazosin. 4 tablet 6     tadalafil (CIALIS) 20 MG tablet Take 1 tablet (20 mg) by mouth daily as needed for erectile dysfunction 12 tablet 11     blood glucose monitoring (FREESTYLE FREEDOM LITE) meter device kit Use to test blood sugars daily as directed. 1 kit 0     insulin glargine (LANTUS) 100 UNIT/ML vial Inject 10 Units Subcutaneous At Bedtime 1 Month 3     insulin pen needle 31G X 8 MM Use daily to inject Lantus. Please make ANNUAL exam with PRIMARY provider for continued refills. 30 each 5     aspirin (ASPIRIN CHILDRENS) 81 MG chewable tablet Take 81 mg by mouth daily.         Social History   Substance Use Topics     Smoking status: Former Smoker     Packs/day: 0.50     Years: 17.00     Quit date: 1/1/2008     Smokeless tobacco: Never Used     Alcohol use No           OBJECTIVE:  /77 (BP Location: Right arm, Patient Position: Chair, Cuff Size: Adult Regular)  Pulse 82  Wt 74.7 kg (164 lb 11.2 oz)  BMI 22.97 kg/m2  GENERAL APPEARANCE: Alert, no acute distress  NEURO: Alert, oriented, speech and mentation normal  PSYCHE: mentation appears normal, affect and mood normal    ASSESSMENT/PLAN:    DM: cont metformin, start Januvia, stop Lantus, see MTM.   As above, likely will need additional oral meds but he very much wants to try.  He is also resuming treadmill workouts, had not been   Consider ace.    Cont w/ derm    See me in a mo, MTM in meanwhile    MEAGHAN RUELAS MD

## 2017-02-28 NOTE — TELEPHONE ENCOUNTER
MTM referral from: [facility] visit (referral by provider)    MTM referral outreach attempt #1 on February 28, 2017 at 12:45 PM      Outcome: Patient scheduled for MTM appointment on 3/7/2017.    Cat Bermudez, MTM Coordinator Intern

## 2017-02-28 NOTE — MR AVS SNAPSHOT
After Visit Summary   2/28/2017    Otto Hughes    MRN: [id number]           Patient Information     Date Of Birth          1970        Visit Information        Provider Department      2/28/2017 9:30 AM Antelmo Carrera MD [facility]        Today's Diagnoses     DM type 2, goal HbA1c 7%-8% (H)    -  1    Screening for diabetic retinopathy        Screening for diabetic peripheral neuropathy          Care Instructions    Primary Care Center Medication Refill Request Information:  * Please contact your pharmacy regarding ANY request for medication refills.  ** [facility] Prescription Fax = [phone]  * Please allow 3 business days for routine medication refills.  * Please allow 5 business days for controlled substance medication refills.     Primary Care Center Test Result notification information:  *You will be notified with in 7-10 days of your appointment day regarding the results of your test.  If you are on MyChart you will be notified as soon as the provider has reviewed the results and signed off on them.          Follow-ups after your visit        Additional Services     OPHTHALMOLOGY ADULT REFERRAL       Your provider has referred you to: [facility]: Eye Clinic [facility] [phone]   [email or web address]    Please be aware that coverage of these services is subject to the terms and limitations of your health insurance plan.  Call member services at your health plan with any benefit or coverage questions.      Please bring the following with you to your appointment:    (1) Any X-Rays, CTs or MRIs which have been performed.  Contact the facility where they were done to arrange for  prior to your scheduled appointment.    (2) List of current medications  (3) This referral request   (4) Any documents/labs given to you for this referral            PHARMACY (MTM) REFERRAL       Your provider has referred you to: **[facility]  Management Scheduling (numerous locations) [phone]   [email or web address]  [facility]: Primary [facility] [phone]   [email or web address]    Reason for Referral: DM 2    The [facility] department will contact you to schedule an appointment.  You may also schedule the appointment by calling [phone].  For [facility] patients, please call [phone] to confirm/schedule your appointment on the next business day.    This service is designed to help you get the most from your medications.  A specially trained Pharmacist will work closely with you and your providers to solve any questions, concerns, issues or problems related to your medications.    Please bring all of your prescription and non-prescription medications (such as vitamins, over-the-counter medications, and herbals) or a detailed medication list to your appointment.    If you have a glucose meter or other home monitoring information, please also bring this to your appointment (i.e. blood glucose log, blood pressure log, pain log, etc.).                  Follow-up notes from your care team     Return in about 4 weeks (around 3/28/2017).      Who to contact     Please call your clinic at [phone] to:    Ask questions about your health    Make or cancel appointments    Discuss your medicines    Learn about your test results    Speak to your doctor   If you have compliments or concerns about an experience at your clinic, or if you wish to file a complaint, please contact [facility] Patient Relations at [phone] or email us at [email or web address]         Additional Information About Your Visit        Care EveryWhere ID     This is your Care EveryWhere ID. This could be used by other organizations to access your [facility] medical records  [id number]        Your  Vitals Were     Pulse BMI (Body Mass Index)                82 22.97 kg/m2           Blood Pressure from Last 3 Encounters:   02/28/17 111/77   01/31/17 134/88   03/03/16 135/82    Weight from Last 3 Encounters:   02/28/17 74.7 kg (164 lb 11.2 oz)   01/31/17 75.3 kg (166 lb)   03/03/16 81.5 kg (179 lb 11.2 oz)              We Performed the Following     OPHTHALMOLOGY ADULT REFERRAL     PHARMACY (MTM) REFERRAL          Today's Medication Changes          These changes are accurate as of: 2/28/17 10:41 AM.  If you have any questions, ask your nurse or doctor.               Start taking these medicines.        Dose/Directions    clobetasol 0.05 % ointment   Commonly known as:  TEMOVATE   Used for:  Prurigo nodularis   Started by:  Sujit Kerr MD        Apply topically 2 times daily Apply to nodules that have been picked at and cover with bandaid. Avoid face, underarms, and groin folds.   Quantity:  60 g   Refills:  3       sitagliptin 50 MG tablet   Commonly known as:  JANUVIA   Used for:  DM type 2, goal HbA1c 7%-8% (H)   Started by:  Antelmo Carrera MD        Dose:  50 mg   Take 1 tablet (50 mg) by mouth daily   Quantity:  90 tablet   Refills:  3       triamcinolone 0.1 % ointment   Commonly known as:  KENALOG   Used for:  Atopic neurodermatitis   Started by:  Sujit Kerr MD        Apply topically 2 times daily To sites of dry, red, itchy skin.   Quantity:  454 g   Refills:  1            Where to get your medicines      These medications were sent to [facility] [place], [place] - [address]  [address] [address]     Phone:  [phone]     clobetasol 0.05 % ointment    sitagliptin 50 MG tablet    triamcinolone 0.1 % ointment                Primary Care Provider Office Phone # Fax #    Antelmo Carrera [phone]       PRIMARY [facility]ARE CENTER [address] [place] [id number]        Thank you!     Thank you for choosing [facility]  for your care. Our goal is always to provide you with excellent care. Hearing back from our patients is one way we can continue to improve our services. Please take a few minutes to complete the written survey that you may receive in the mail after your visit with us. Thank you!             Your Updated Medication List - Protect others around you: Learn how to safely use, store and throw away your medicines at www.disposemymeds.org.          This list is accurate as of: 2/28/17 10:41 AM.  Always use your most recent med list.                   Brand Name Dispense Instructions for use    ASPIRIN CHILDRENS 81 MG chewable tablet   Generic drug:  aspirin      Take 81 mg by mouth daily.       blood glucose monitoring lancets     1 Box    1 each by In Vitro route 3 times daily Use as directed       blood glucose monitoring meter device kit     1 kit    Use to test blood sugars daily as directed.       blood glucose monitoring test strip    FREESTYLE LITE    100 strip    Use to test blood sugars 3 times daily or as directed.       clobetasol 0.05 % ointment    TEMOVATE    60 g    Apply topically 2 times daily Apply to nodules that have been picked at and cover with bandaid. Avoid face, underarms, and groin folds.       clotrimazole-betamethasone cream    LOTRISONE    45 g    Apply topically 2 times daily Apply to rash behind knees       insulin glargine 100 UNIT/ML injection    LANTUS    1 Month    Inject 10 Units Subcutaneous At Bedtime       insulin pen needle 31G X 8 MM     30 each    Use daily to inject Lantus. Please make ANNUAL exam with PRIMARY provider for continued refills.       metFORMIN 500 MG 24 hr tablet    GLUCOPHAGE-XR    120 tablet    Take 4 tablets (2,000 mg) by mouth daily (with dinner)       sildenafil 100 MG cap/tab    VIAGRA    4 tablet    Take 0.5-1 tablets ( mg) by mouth daily as needed for erectile dysfunction Take 30 min to 4 hours before intercourse.  Never use  with nitroglycerin, terazosin or doxazosin.       simvastatin 20 MG tablet    ZOCOR    90 tablet    Take 1 tablet (20 mg) by mouth At Bedtime . Patient needs to see primary provider and have labs for further refills.       sitagliptin 50 MG tablet    JANUVIA    90 tablet    Take 1 tablet (50 mg) by mouth daily       tadalafil 20 MG tablet    CIALIS    12 tablet    Take 1 tablet (20 mg) by mouth daily as needed for erectile dysfunction       triamcinolone 0.1 % ointment    KENALOG    454 g    Apply topically 2 times daily To sites of dry, red, itchy skin.

## 2017-04-25 DIAGNOSIS — E78.5 HYPERLIPIDEMIA: ICD-10-CM

## 2017-04-25 RX ORDER — SIMVASTATIN 20 MG
20 TABLET ORAL AT BEDTIME
Qty: 90 TABLET | Refills: 3 | Status: SHIPPED | OUTPATIENT
Start: 2017-04-25 | End: 2018-06-11

## 2017-04-25 NOTE — TELEPHONE ENCOUNTER
Simvastatin 20 MG Tablet  Last Written Prescription Date: 12/29/2016  Last Fill Quantity: 90, # refills: 0  Last Office Visit with G, P or [facility] prescribing provider: 02/28/2017       Lab Results   Component Value Date    CHOL 133 01/31/2017     Lab Results   Component Value Date    HDL 34 01/31/2017     Lab Results   Component Value Date    LDL 68 01/31/2017     Lab Results   Component Value Date    TRIG 152 01/31/2017     Lab Results   Component Value Date    CHOLHDLRATIO 2.6 09/15/2014     Eulogio Adames CP[facility]  [phone]

## 2017-06-05 ENCOUNTER — OFFICE VISIT (OUTPATIENT)
Dept: OPHTHALMOLOGY | Facility: CLINIC | Age: 47
End: 2017-06-05
Attending: OPHTHALMOLOGY
Payer: COMMERCIAL

## 2017-06-05 DIAGNOSIS — H52.4 PRESBYOPIA: ICD-10-CM

## 2017-06-05 DIAGNOSIS — E11.9 DIABETES MELLITUS TYPE 2 WITHOUT RETINOPATHY (H): Primary | ICD-10-CM

## 2017-06-05 DIAGNOSIS — H26.9 CATARACTS, BILATERAL: ICD-10-CM

## 2017-06-05 DIAGNOSIS — H35.3130 AGE-RELATED MACULAR DEGENERATION, DRY, BOTH EYES: ICD-10-CM

## 2017-06-05 PROCEDURE — 92015 DETERMINE REFRACTIVE STATE: CPT | Mod: ZF

## 2017-06-05 PROCEDURE — 99215 OFFICE O/P EST HI 40 MIN: CPT | Mod: ZF

## 2017-06-05 ASSESSMENT — TONOMETRY
OD_IOP_MMHG: 13
OS_IOP_MMHG: 9
IOP_METHOD: TONOPEN

## 2017-06-05 ASSESSMENT — SLIT LAMP EXAM - LIDS
COMMENTS: NORMAL
COMMENTS: NORMAL

## 2017-06-05 ASSESSMENT — REFRACTION_MANIFEST
OD_ADD: +1.75
OS_AXIS: 085
OD_CYLINDER: +0.50
OS_SPHERE: PLANO
OD_AXIS: 145
OS_ADD: +1.75
OS_CYLINDER: +0.50
OD_SPHERE: +0.50

## 2017-06-05 ASSESSMENT — CUP TO DISC RATIO
OS_RATIO: 0.4
OD_RATIO: 0.4

## 2017-06-05 ASSESSMENT — VISUAL ACUITY
OD_SC: 20/20
OD_SC+: -3
METHOD: SNELLEN - LINEAR
OS_SC: 20/25

## 2017-06-05 ASSESSMENT — EXTERNAL EXAM - RIGHT EYE: OD_EXAM: NORMAL

## 2017-06-05 ASSESSMENT — EXTERNAL EXAM - LEFT EYE: OS_EXAM: NORMAL

## 2017-06-05 NOTE — NURSING NOTE
Chief Complaints and History of Present Illnesses   Patient presents with     Yearly Exam     Diabetic Exam      HPI    Additional Referring Providers:  Dr. Debi ORDOÑEZ    Affected eye(s):  Both   Symptoms:     Tearing      Unknown duration    Frequency:  Constant       Do you have eye pain now?:  No      Comments:  Sent here at request of Dr. Debi ORDOÑEZ [facility]. Pt states that he was diagnosed with Type 2 Diabetes 6 years ago  BS=   140  Last checked   A1C=  ? Unaware   PCP=  Dr. Debi ORDOÑEZ [facility]. Pt feels that vision is stable, likes to wear +1.25 readers for fine print. Denies any pain or discomfort today. KENYON ALLEN, COA 8:49 AM 06/05/2017

## 2017-06-05 NOTE — LETTER
6/5/2017       RE: Otto Hughes  [address] [address]     Dear Colleague,    Thank you for referring your patient, Otto Hughes, to the EYE CLINIC at [facility]. Please see a copy of my visit note below.    HPI  Otto Hughes is a 47 year old male here for diabetic eye exam. He feels his vision is good and stable in both eyes at distance. He uses +1.25 OTC readers for small print which help with near vision. No eye pain, redness, discharge. No flashes/floaters.    POH: No history of eye surgery or trauma  PMH: DMII, HL  FH: No known FH of eye problems  SH: Former smoker    Assessment & Plan      (E11.9) Diabetes mellitus type 2 without retinopathy (H)  (primary encounter diagnosis)  Comment: Diagnosed around 2013. On oral hypoglycemics (states he stopped insulin a couple of months ago). Last a1c 10.2 1/31/17. No diabetic retinopathy.  Plan: Discussed the importance of tight blood glucose control in the prevention of diabetic retinopathy. Recommend yearly dilated eye exam.    (H35.2880) Age-related macular degeneration, dry, both eyes  Comment: Drusen-appearance OU, but young age.   Plan: Recommend AREDs/Amsler monitoring.    (H26.9) Cataracts, bilateral  Comment: Mild OU.  Plan: Observe    (H52.4) Presbyopia  Comment: Good distance vision without correction.  Plan: Ok to continue with OTC readers     -----------------------------------------------------------------------------------    Patient disposition:   Return in about 1 year (around 6/5/2018) for macula OCT OU. or sooner as needed.    Again, thank you for allowing me to participate in the care of your patient.      Sincerely,    Roxanne Jeronimo MD

## 2017-06-05 NOTE — MR AVS SNAPSHOT
After Visit Summary   2017    Otto Hughes    MRN: [id number]           Patient Information     Date Of Birth          1970        Visit Information        Provider Department      2017 8:00 AM Roxanne Jeronimo MD Eye Clinic        Today's Diagnoses     Diabetes mellitus type 2 without retinopathy (H)    -  1    Age-related macular degeneration, dry, both eyes        Cataracts, bilateral        Presbyopia           Follow-ups after your visit        Follow-up notes from your care team     Return in about 1 year (around 2018) for macula OCT OU.      Who to contact     Please call your clinic at [phone] to:    Ask questions about your health    Make or cancel appointments    Discuss your medicines    Learn about your test results    Speak to your doctor   If you have compliments or concerns about an experience at your clinic, or if you wish to file a complaint, please contact [facility] Patient Relations at [phone] or email us at [email or web address]         Additional Information About Your Visit        MyChart Information     [employer] is an electronic gateway that provides easy, online access to your medical records. With [employer], you can request a clinic appointment, read your test results, renew a prescription or communicate with your care team.     To sign up for [employer] visit the website at [email or web address]   You will be asked to enter the access code listed below, as well as some personal information. Please follow the directions to create your username and password.     Your access code is: [email or web address]  Expires: 2017  6:31 AM     Your access code will  in 90 days. If you need help or a new code, please contact your [facility] Clinic or call [phone] for assistance.        Care EveryWhere ID     This is your Care EveryWhere ID. This could be used by other organizations to access your  [place] medical records  [id number]         Blood Pressure from Last 3 Encounters:   02/28/17 111/77   01/31/17 134/88   03/03/16 135/82    Weight from Last 3 Encounters:   02/28/17 74.7 kg (164 lb 11.2 oz)   01/31/17 75.3 kg (166 lb)   03/03/16 81.5 kg (179 lb 11.2 oz)              Today, you had the following     No orders found for display       Primary Care Provider Office Phone # Fax #    Antelmo Carrera [phone]       PRIMARY CARE CENTER [address] [place] [address]  [facility] [address]        Thank you!     Thank you for choosing EYE CLINIC  for your care. Our goal is always to provide you with excellent care. Hearing back from our patients is one way we can continue to improve our services. Please take a few minutes to complete the written survey that you may receive in the mail after your visit with us. Thank you!             Your Updated Medication List - Protect others around you: Learn how to safely use, store and throw away your medicines at www.disposemymeds.org.          This list is accurate as of: 6/5/17  9:31 AM.  Always use your most recent med list.                   Brand Name Dispense Instructions for use    ASPIRIN CHILDRENS 81 MG chewable tablet   Generic drug:  aspirin      Take 81 mg by mouth daily.       blood glucose monitoring lancets     1 Box    1 each by In Vitro route 3 times daily Use as directed       blood glucose monitoring meter device kit     1 kit    Use to test blood sugars daily as directed.       blood glucose monitoring test strip    FREESTYLE LITE    100 strip    Use to test blood sugars 3 times daily or as directed.       clobetasol 0.05 % ointment    TEMOVATE    60 g    Apply topically 2 times daily Apply to nodules that have been picked at and cover with bandaid. Avoid face, underarms, and groin folds.       clotrimazole-betamethasone cream    LOTRISONE    45 g    Apply topically 2 times daily Apply to rash behind knees       insulin glargine 100  UNIT/ML injection    LANTUS    1 Month    Inject 10 Units Subcutaneous At Bedtime       insulin pen needle 31G X 8 MM     30 each    Use daily to inject Lantus. Please make ANNUAL exam with PRIMARY provider for continued refills.       metFORMIN 500 MG 24 hr tablet    GLUCOPHAGE-XR    120 tablet    Take 4 tablets (2,000 mg) by mouth daily (with dinner)       sildenafil 100 MG cap/tab    VIAGRA    4 tablet    Take 0.5-1 tablets ( mg) by mouth daily as needed for erectile dysfunction Take 30 min to 4 hours before intercourse.  Never use with nitroglycerin, terazosin or doxazosin.       simvastatin 20 MG tablet    ZOCOR    90 tablet    Take 1 tablet (20 mg) by mouth At Bedtime       sitagliptin 50 MG tablet    JANUVIA    90 tablet    Take 1 tablet (50 mg) by mouth daily       tadalafil 20 MG tablet    CIALIS    12 tablet    Take 1 tablet (20 mg) by mouth daily as needed for erectile dysfunction       triamcinolone 0.1 % ointment    KENALOG    454 g    Apply topically 2 times daily To sites of dry, red, itchy skin.

## 2017-06-05 NOTE — PROGRESS NOTES
MARZENA Hughes is a 47 year old male here for diabetic eye exam. He feels his vision is good and stable in both eyes at distance. He uses +1.25 OTC readers for small print which help with near vision. No eye pain, redness, discharge. No flashes/floaters.    POH: No history of eye surgery or trauma  PMH: DMII, HL  FH: No known FH of eye problems  SH: Former smoker    Assessment & Plan      (E11.9) Diabetes mellitus type 2 without retinopathy (H)  (primary encounter diagnosis)  Comment: Diagnosed around 2013. On oral hypoglycemics (states he stopped insulin a couple of months ago). Last a1c 10.2 1/31/17. No diabetic retinopathy.  Plan: Discussed the importance of tight blood glucose control in the prevention of diabetic retinopathy. Recommend yearly dilated eye exam.    (H35.6823) Age-related macular degeneration, dry, both eyes  Comment: Drusen-appearance OU, but young age.   Plan: Recommend AREDs/Amsler monitoring.    (H26.9) Cataracts, bilateral  Comment: Mild OU.  Plan: Observe    (H52.4) Presbyopia  Comment: Good distance vision without correction.  Plan: Ok to continue with OTC readers     -----------------------------------------------------------------------------------    Patient disposition:   Return in about 1 year (around 6/5/2018) for macula OCT OU. or sooner as needed.    Teaching statement:  Complete documentation of historical and exam elements from today's encounter can be found in the full encounter summary report (not reduplicated in this progress note). I personally obtained the chief complaint(s) and history of present illness.  I confirmed and edited as necessary the review of systems, past medical/surgical history, family history, social history, and examination findings as documented by others; and I examined the patient myself. I personally reviewed the relevant tests, images, and reports as documented above.     I formulated and edited as necessary the assessment and plan and discussed  the findings and management plan with the patient and family.    Roxanne Jeronimo MD  Comprehensive Ophthalmology & Ocular Pathology  Department of Ophthalmology and Visual Neurosciences  [email or web address]  Pager [phone]

## 2017-06-26 DIAGNOSIS — E11.65 TYPE 2 DIABETES MELLITUS WITH HYPERGLYCEMIA (H): ICD-10-CM

## 2017-06-26 NOTE — TELEPHONE ENCOUNTER
Metformin 500 ER 24 hr Tablet      Last Written Prescription Date: 11/16/2016  Last Fill Quantity: 11, # refills: 5  Last Office Visit with G, P or [facility] prescribing provider:  02/28/2017        BP Readings from Last 3 Encounters:   02/28/17 111/77   01/31/17 134/88   03/03/16 135/82     Lab Results   Component Value Date    MICROL <5 01/31/2017     Lab Results   Component Value Date    UMALCR Unable to calculate due to low value 01/31/2017     Creatinine   Date Value Ref Range Status   01/31/2017 0.83 0.66 - 1.25 mg/dL Final   ]  GFR Estimate   Date Value Ref Range Status   01/31/2017 >90  Non  GFR Calc   >60 mL/min/1.7m2 Final   03/14/2016 >90  Non  GFR Calc   >60 mL/min/1.7m2 Final   10/14/2013 >90 >60 mL/min/1.7m2 Final     GFR Estimate If Black   Date Value Ref Range Status   01/31/2017 >90   GFR Calc   >60 mL/min/1.7m2 Final   03/14/2016 >90   GFR Calc   >60 mL/min/1.7m2 Final   10/14/2013 >90 >60 mL/min/1.7m2 Final     Lab Results   Component Value Date    CHOL 133 01/31/2017     Lab Results   Component Value Date    HDL 34 01/31/2017     Lab Results   Component Value Date    LDL 68 01/31/2017     Lab Results   Component Value Date    TRIG 152 01/31/2017     Lab Results   Component Value Date    CHOLHDLRATIO 2.6 09/15/2014     Lab Results   Component Value Date    AST 10 01/31/2017     Lab Results   Component Value Date    ALT 18 01/31/2017     Lab Results   Component Value Date    A1C 10.2 01/31/2017    A1C 8.2 03/14/2016    A1C 7.7 09/15/2014    A1C 7.8 10/14/2013    A1C 7.0 10/23/2012     Potassium   Date Value Ref Range Status   01/31/2017 4.0 3.4 - 5.3 mmol/L Final           Eulogio Adames, [facility]  [phone]

## 2017-06-27 RX ORDER — METFORMIN HCL 500 MG
2000 TABLET, EXTENDED RELEASE 24 HR ORAL
Qty: 120 TABLET | Refills: 0 | Status: SHIPPED | OUTPATIENT
Start: 2017-06-27 | End: 2017-08-01

## 2017-08-01 DIAGNOSIS — E11.65 TYPE 2 DIABETES MELLITUS WITH HYPERGLYCEMIA (H): ICD-10-CM

## 2017-08-01 RX ORDER — METFORMIN HCL 500 MG
TABLET, EXTENDED RELEASE 24 HR ORAL
Qty: 120 TABLET | Refills: 0 | Status: SHIPPED | OUTPATIENT
Start: 2017-08-01 | End: 2017-09-13

## 2017-08-01 NOTE — TELEPHONE ENCOUNTER
metformin  Last Written Prescription Date:  6/27/17  Last Fill Quantity: 120,   # refills: 0  Last Office Visit : 2/28/17  Future Office visit:  No  1/31/17 11:06 AM [id number]    Component Results   Component Value Flag Ref Range Units Status Collected Lab   Creatinine Urine 84   mg/dL Final 01/31/2017 11:06 AM FrStHsLb   Albumin Urine mg/L <5   mg/L Final 01/31/2017 11:06 AM FrStHsLb   Albumin Urine mg/g Cr   0 - 17 mg/g Cr Final 01/31/2017 11:06 AM FrStHsLb   Unable to calculate due to low value     Component Value Flag Ref Range Units Status Collected Lab   Hemoglobin A1C 10.2 (H) 4.3 - 6.0 % Final 01/31/2017 10:50 AM      Creatinine   Date Value Ref Range Status   01/31/2017 0.83 0.66 - 1.25 mg/dL Final   ]  appt needed letter sent

## 2017-08-01 NOTE — LETTER
8/1/2017     Otto Hughes  [address] [address]      Dear Otto:    This letter is a reminder that you are due to see your Primary Care Provider.  Please call [phone] to schedule an appointment for a visit with Dr Nawaf Ball,    Primary Care Center     [facility]  [address] [address]  Phone: [phone]  Fax: [phone]

## 2017-09-13 DIAGNOSIS — E11.65 TYPE 2 DIABETES MELLITUS WITH HYPERGLYCEMIA (H): ICD-10-CM

## 2017-09-13 DIAGNOSIS — E11.9 DIABETES MELLITUS, TYPE 2 (H): Primary | ICD-10-CM

## 2017-09-13 RX ORDER — METFORMIN HCL 500 MG
TABLET, EXTENDED RELEASE 24 HR ORAL
Qty: 120 TABLET | Refills: 0 | Status: SHIPPED | OUTPATIENT
Start: 2017-09-13 | End: 2017-10-17

## 2017-09-13 NOTE — TELEPHONE ENCOUNTER
metFORMIN (GLUCOPHAGE-XR) 500 MG 24 hr tablet  Last Written Prescription Date:  8/1/17  Last Fill Quantity: 120,   # refills: 0  Last Office Visit with G, [facility] or [facility] prescribing provider: 2/28/17  Future Office visit:   None    Creatinine   Date Value Ref Range Status   01/31/2017 0.83 0.66 - 1.25 mg/dL Final   ]  Lab Results   Component Value Date    A1C 10.2 01/31/2017    A1C 8.2 03/14/2016    A1C 7.7 09/15/2014    A1C 7.8 10/14/2013    A1C 7.0 10/23/2012     Lab Results   Component Value Date    MICROL <5 01/31/2017     No results found for: MICROALBUMIN        Routing refill request to provider for review/approval because:   metFORMIN (GLUCOPHAGE-XR) 500 MG 24 hr tablet. Per last rf notation needs to be seen appt letter sent. rf or deny?

## 2017-09-13 NOTE — TELEPHONE ENCOUNTER
Chasity Rodrigues         Last Written Prescription Date: 11/7/15  Last Fill Quantity: 1 box, # refills: 3  Last Office Visit with [facility], [facility] or [facility] prescribing provider:  6/5/17        BP Readings from Last 3 Encounters:   02/28/17 111/77   01/31/17 134/88   03/03/16 135/82     Lab Results   Component Value Date    MICROL <5 01/31/2017     Lab Results   Component Value Date    UMALCR Unable to calculate due to low value 01/31/2017     Creatinine   Date Value Ref Range Status   01/31/2017 0.83 0.66 - 1.25 mg/dL Final   ]  GFR Estimate   Date Value Ref Range Status   01/31/2017 >90  Non  GFR Calc   >60 mL/min/1.7m2 Final   03/14/2016 >90  Non  GFR Calc   >60 mL/min/1.7m2 Final   10/14/2013 >90 >60 mL/min/1.7m2 Final     GFR Estimate If Black   Date Value Ref Range Status   01/31/2017 >90   GFR Calc   >60 mL/min/1.7m2 Final   03/14/2016 >90   GFR Calc   >60 mL/min/1.7m2 Final   10/14/2013 >90 >60 mL/min/1.7m2 Final     Lab Results   Component Value Date    CHOL 133 01/31/2017     Lab Results   Component Value Date    HDL 34 01/31/2017     Lab Results   Component Value Date    LDL 68 01/31/2017     Lab Results   Component Value Date    TRIG 152 01/31/2017     Lab Results   Component Value Date    CHOLHDLRATIO 2.6 09/15/2014     Lab Results   Component Value Date    AST 10 01/31/2017     Lab Results   Component Value Date    ALT 18 01/31/2017     Lab Results   Component Value Date    A1C 10.2 01/31/2017    A1C 8.2 03/14/2016    A1C 7.7 09/15/2014    A1C 7.8 10/14/2013    A1C 7.0 10/23/2012     Potassium   Date Value Ref Range Status   01/31/2017 4.0 3.4 - 5.3 mmol/L Final     Thank you,  Haylie Titus, [facility]

## 2017-09-13 NOTE — LETTER
Otto ADAN Ellen  [address] [address]        September 13, 2017        This letter is a reminder that you are overdue to see your Primary Care Provider for an Annual Visit and needed labs. You must be seen by your Primary Care Provider on a yearly basis and have appropriate labs drawn for continued care and prescription refills. Please call [phone] to schedule an appointment for an Annual Visit with Dr Debi ORDOÑEZ.       [facility],    Primary Care [place]

## 2017-10-17 DIAGNOSIS — E11.65 TYPE 2 DIABETES MELLITUS WITH HYPERGLYCEMIA (H): ICD-10-CM

## 2017-10-17 RX ORDER — METFORMIN HCL 500 MG
TABLET, EXTENDED RELEASE 24 HR ORAL
Qty: 120 TABLET | Refills: 0 | Status: SHIPPED | OUTPATIENT
Start: 2017-10-17 | End: 2017-11-20

## 2017-11-14 ENCOUNTER — OFFICE VISIT (OUTPATIENT)
Dept: FAMILY MEDICINE | Facility: CLINIC | Age: 47
End: 2017-11-14

## 2017-11-14 VITALS
DIASTOLIC BLOOD PRESSURE: 81 MMHG | BODY MASS INDEX: 23.49 KG/M2 | HEART RATE: 98 BPM | SYSTOLIC BLOOD PRESSURE: 124 MMHG | WEIGHT: 168.4 LBS

## 2017-11-14 DIAGNOSIS — E11.9 DM TYPE 2, GOAL HBA1C 7%-8% (H): ICD-10-CM

## 2017-11-14 DIAGNOSIS — N52.8 OTHER MALE ERECTILE DYSFUNCTION: Primary | ICD-10-CM

## 2017-11-14 LAB — HBA1C MFR BLD: 11.2 % (ref 4.3–6)

## 2017-11-14 RX ORDER — SILDENAFIL CITRATE 20 MG/1
TABLET ORAL
Qty: 90 TABLET | Refills: 0 | Status: SHIPPED | OUTPATIENT
Start: 2017-11-14 | End: 2018-07-10

## 2017-11-14 RX ORDER — SILDENAFIL CITRATE 20 MG/1
TABLET ORAL
Qty: 12 TABLET | Refills: 3 | Status: SHIPPED | OUTPATIENT
Start: 2017-11-14 | End: 2017-11-14

## 2017-11-14 RX ORDER — SILDENAFIL CITRATE 20 MG/1
TABLET ORAL
Qty: 12 TABLET | Refills: 3 | Status: SHIPPED | OUTPATIENT
Start: 2017-11-14 | End: 2018-07-09

## 2017-11-14 ASSESSMENT — PAIN SCALES - GENERAL: PAINLEVEL: MODERATE PAIN (5)

## 2017-11-14 NOTE — PROGRESS NOTES
SUBJECTIVE:    Pt is a 47 year old male with pmh of     Patient Active Problem List   Diagnosis     Hyperlipidemia     T2DM (type 2 diabetes mellitus) (H)       who is here for evaluation of had concerns including Shoulder Pain.    Here for a f/u DM.  Due for hgba1c.  Trying to cut sugar; he's frustrated as his wife is excellent cook and trying to limit some of the food she makes is difficult, he has two kids so others are eating things he knows he should avoid.    Left shoulder pain, and limited ROM, 4-5 months. No injury. Hurts to lie on or move.    ED: Viagra too expensive, will see if can get generic sildenafil through    No Known Allergies            Current Outpatient Prescriptions   Medication Sig Dispense Refill     sildenafil (REVATIO) 20 MG tablet Take 1-2 po qd prn 90 tablet 0     sildenafil (REVATIO) 20 MG tablet Take 1-2 po qd prn 12 tablet 3     metFORMIN (GLUCOPHAGE-XR) 500 MG 24 hr tablet Take 4 tablets (2,000 mg) by mouth daily (with dinner) Patient needs to see primary provider and have labs for further refills. 120 tablet 0     insulin glargine (LANTUS) 100 UNIT/ML injection Inject 10 Units Subcutaneous At Bedtime Patient needs to see primary provider and have labs for further refills. 3 mL 0     simvastatin (ZOCOR) 20 MG tablet Take 1 tablet (20 mg) by mouth At Bedtime 90 tablet 3     triamcinolone (KENALOG) 0.1 % ointment Apply topically 2 times daily To sites of dry, red, itchy skin. 454 g 1     clobetasol (TEMOVATE) 0.05 % ointment Apply topically 2 times daily Apply to nodules that have been picked at and cover with bandaid. Avoid face, underarms, and groin folds. 60 g 3     sitagliptin (JANUVIA) 50 MG tablet Take 1 tablet (50 mg) by mouth daily 90 tablet 3     blood glucose monitoring (FREESTYLE LITE) test strip Use to test blood sugars 3 times daily or as directed. 100 strip 11     clotrimazole-betamethasone (LOTRISONE) cream Apply topically 2 times daily Apply to rash behind knees 45 g 1      blood glucose monitoring (FREESTYLE) lancets 1 each by In Vitro route 3 times daily Use as directed 1 Box 5     sildenafil (VIAGRA) 100 MG tablet Take 0.5-1 tablets ( mg) by mouth daily as needed for erectile dysfunction Take 30 min to 4 hours before intercourse.  Never use with nitroglycerin, terazosin or doxazosin. 4 tablet 6     tadalafil (CIALIS) 20 MG tablet Take 1 tablet (20 mg) by mouth daily as needed for erectile dysfunction 12 tablet 11     blood glucose monitoring (FREESTYLE FREEDOM LITE) meter device kit Use to test blood sugars daily as directed. 1 kit 0     insulin pen needle 31G X 8 MM Use daily to inject Lantus. Please make ANNUAL exam with PRIMARY provider for continued refills. 30 each 5     aspirin (ASPIRIN CHILDRENS) 81 MG chewable tablet Take 81 mg by mouth daily.       [DISCONTINUED] sildenafil (REVATIO) 20 MG tablet Take 1-2 po qd prn 12 tablet 3       Social History   Substance Use Topics     Smoking status: Former Smoker     Packs/day: 0.50     Years: 17.00     Quit date: 1/1/2008     Smokeless tobacco: Never Used     Alcohol use No           OBJECTIVE:  /81  Pulse 98  Wt 76.4 kg (168 lb 6.4 oz)  BMI 23.49 kg/m2  GENERAL APPEARANCE: Alert, no acute distress  NEURO: Alert, oriented, speech and mentation normal  PSYCHE: mentation appears normal, affect and mood normal    ASSESSMENT/PLAN:    DM: labs, see me three mo (then whole labs). Shots up to date. Saw eye MD, he agrees to see dentist close to home. On metformin, Januvia now, not on insulin.     Is on zocor. Not on ACE now, consider add next visit. Does take asa qd.    Shoulder pain, likely frozen shoulder, he'll see walk in ortho MD when has time in next few days (works nearby)    ED: see if can get generic sildenafil, if can and not effective could increase dose    MEAGHAN RUELAS MD

## 2017-11-14 NOTE — MR AVS SNAPSHOT
After Visit Summary   11/14/2017    Otto Hughes    MRN: [id number]           Patient Information     Date Of Birth          1970        Visit Information        Provider Department      11/14/2017 11:30 AM Antelmo Carrera MD [facility]        Today's Diagnoses     Other male erectile dysfunction    -  1    DM type 2, goal HbA1c 7%-8% (H)           Follow-ups after your visit        Follow-up notes from your care team     Return in about 3 months (around 2/14/2018).      Your next 10 appointments already scheduled     Nov 14, 2017  1:15 PM CST   LAB with  LAB    Health Lab ([facility])    [address]  [facility] [address] [phone]           Please do not eat 10-12 hours before your appointment if you are coming in fasting for labs on lipids, cholesterol, or glucose (sugar). This does not apply to pregnant women. Water, hot tea and black coffee (with nothing added) are okay. Do not drink other fluids, diet soda or chew gum.              Future tests that were ordered for you today     Open Future Orders        Priority Expected Expires Ordered    Hemoglobin A1c Routine 11/14/2017 11/28/2017 11/14/2017            Who to contact     Please call your clinic at [phone] to:    Ask questions about your health    Make or cancel appointments    Discuss your medicines    Learn about your test results    Speak to your doctor   If you have compliments or concerns about an experience at your clinic, or if you wish to file a complaint, please contact [facility] Patient Relations at [phone] or email us at [email or web address]         Additional Information About Your Visit        MyChart Information     [employer] is an electronic gateway that provides easy, online access to your medical records. With [employer], you can request a clinic appointment, read your test results, renew a  prescription or communicate with your care team.     To sign up for MyHealthTeamshart visit the website at [email or web address]   You will be asked to enter the access code listed below, as well as some personal information. Please follow the directions to create your username and password.     Your access code is: [email or web address]  Expires: 2018  5:30 AM     Your access code will  in 90 days. If you need help or a new code, please contact your [facility] or call [phone] for assistance.        Care EveryWhere ID     This is your Care EveryWhere ID. This could be used by other organizations to access your [place] medical records  [id number]        Your Vitals Were     Pulse BMI (Body Mass Index)                98 23.49 kg/m2           Blood Pressure from Last 3 Encounters:   17 124/81   17 111/77   17 134/88    Weight from Last 3 Encounters:   17 76.4 kg (168 lb 6.4 oz)   17 74.7 kg (164 lb 11.2 oz)   17 75.3 kg (166 lb)                 Today's Medication Changes          These changes are accurate as of: 17 12:27 PM.  If you have any questions, ask your nurse or doctor.               Start taking these medicines.        Dose/Directions    * sildenafil 20 MG tablet   Commonly known as:  REVATIO   Used for:  Other male erectile dysfunction   Started by:  Antelmo Carrera MD        Take 1-2 po qd prn   Quantity:  90 tablet   Refills:  0       * sildenafil 20 MG tablet   Commonly known as:  REVATIO   Used for:  Other male erectile dysfunction   Started by:  Antelmo Carrera MD        Take 1-2 po qd prn   Quantity:  12 tablet   Refills:  3       * Notice:  This list has 2 medication(s) that are the same as other medications prescribed for you. Read the directions carefully, and ask your doctor or other care provider to review them with you.         Where to get your medicines      These medications were sent to [facility]  [facility] - [place], [place] - [address]  [address], [facility] [address]     Phone:  [phone]     sildenafil 20 MG tablet         These medications were sent to [facility] - [place], [place] - [address]  [address], [facility] [address]    Hours:  TRANSPLANT PHONE NUMBER [phone] Phone:  [phone]     sildenafil 20 MG tablet                Primary Care Provider Office Phone # Fax #    Antelmo Carrera [phone]       [address]  [facility] [address]        Equal Access to Services     JINA CRUZ : Hadii aad carlos hadasho Somaryellen, waaxda luqadaha, qaybta kaalmada adeegyada, adry zheng. So [facility] [phone].    ATENCIÓN: Si habla español, tiene a jones disposición servicios gratuitos de asistencia lingüística. Ll[facility] [phone].    We comply with applicable federal civil rights laws and [place] laws. We do not discriminate on the basis of race, color, national origin, age, disability, sex, sexual orientation, or gender identity.            Thank you!     Thank you for choosing [facility]  for your care. Our goal is always to provide you with excellent care. Hearing back from our patients is one way we can continue to improve our services. Please take a few minutes to complete the written survey that you may receive in the mail after your visit with us. Thank you!             Your Updated Medication List - Protect others around you: Learn how to safely use, store and throw away your medicines at www.disposemymeds.org.          This list is accurate as of: 11/14/17 12:27 PM.  Always use your most recent med list.                   Brand Name Dispense Instructions for use Diagnosis    ASPIRIN CHILDRENS 81 MG chewable tablet   Generic drug:  aspirin      Take 81 mg by mouth daily.        blood glucose monitoring lancets     1 Box    1 each by In Vitro  route 3 times daily Use as directed    Diabetes mellitus, type 2 (H)       blood glucose monitoring meter device kit     1 kit    Use to test blood sugars daily as directed.    Type 2 diabetes mellitus without complication (H)       blood glucose monitoring test strip    FREESTYLE LITE    100 strip    Use to test blood sugars 3 times daily or as directed.    Diabetes mellitus, type 2 (H)       clobetasol 0.05 % ointment    TEMOVATE    60 g    Apply topically 2 times daily Apply to nodules that have been picked at and cover with bandaid. Avoid face, underarms, and groin folds.    Prurigo nodularis       clotrimazole-betamethasone cream    LOTRISONE    45 g    Apply topically 2 times daily Apply to rash behind knees    Rash and nonspecific skin eruption       insulin glargine 100 UNIT/ML injection    LANTUS    3 mL    Inject 10 Units Subcutaneous At Bedtime Patient needs to see primary provider and have labs for further refills.    Diabetes mellitus, type 2 (H)       insulin pen needle 31G X 8 MM     30 each    Use daily to inject Lantus. Please make ANNUAL exam with PRIMARY provider for continued refills.    Type II or unspecified type diabetes mellitus without mention of complication, not stated as uncontrolled       metFORMIN 500 MG 24 hr tablet    GLUCOPHAGE-XR    120 tablet    Take 4 tablets (2,000 mg) by mouth daily (with dinner) Patient needs to see primary provider and have labs for further refills.    Type 2 diabetes mellitus with hyperglycemia (H)       sildenafil 100 MG tablet    VIAGRA    4 tablet    Take 0.5-1 tablets ( mg) by mouth daily as needed for erectile dysfunction Take 30 min to 4 hours before intercourse.  Never use with nitroglycerin, terazosin or doxazosin.    Other male erectile dysfunction       * sildenafil 20 MG tablet    REVATIO    90 tablet    Take 1-2 po qd prn    Other male erectile dysfunction       * sildenafil 20 MG tablet    REVATIO    12 tablet    Take 1-2 po qd prn    Other  male erectile dysfunction       simvastatin 20 MG tablet    ZOCOR    90 tablet    Take 1 tablet (20 mg) by mouth At Bedtime    Hyperlipidemia       sitagliptin 50 MG tablet    JANUVIA    90 tablet    Take 1 tablet (50 mg) by mouth daily    DM type 2, goal HbA1c 7%-8% (H)       tadalafil 20 MG tablet    CIALIS    12 tablet    Take 1 tablet (20 mg) by mouth daily as needed for erectile dysfunction    Other male erectile dysfunction, Type 2 diabetes mellitus without complication (H)       triamcinolone 0.1 % ointment    KENALOG    454 g    Apply topically 2 times daily To sites of dry, red, itchy skin.    Atopic neurodermatitis       * Notice:  This list has 2 medication(s) that are the same as other medications prescribed for you. Read the directions carefully, and ask your doctor or other care provider to review them with you.

## 2017-11-14 NOTE — NURSING NOTE
Chief Complaint   Patient presents with     Shoulder Pain     Patient here for shoulder pain.     More Alarcon LPN at 11:53 AM on 11/14/2017.

## 2017-11-15 ENCOUNTER — TELEPHONE (OUTPATIENT)
Dept: INTERNAL MEDICINE | Facility: CLINIC | Age: 47
End: 2017-11-15

## 2017-11-15 DIAGNOSIS — E11.9 TYPE 2 DIABETES MELLITUS (H): Primary | ICD-10-CM

## 2017-11-15 NOTE — TELEPHONE ENCOUNTER
Next best plan: have him come in and see Mehnaz for pharmd med review consult, for DM meds, go over all the options

## 2017-11-15 NOTE — TELEPHONE ENCOUNTER
Spoke with pt, informed the results and recommendations as below.  He does not want to be on insulin at this time, wants to work hard on his diet and follow up/recheck the lab in 2 months. If A1C is still high, then he will have insulin.  Thanks.    Soon-Mi      -----------------------------------------------------    Left a message to bernardo back    Soon-Mi  ----------------------------------    ----- Message from Antelmo Carrera MD sent at 11/14/2017  9:03 PM CST -----  He needs to see Mehnaz re: getting back on insulin, see my lab letter to him of today, can you call him to help set that up?

## 2017-11-16 ENCOUNTER — TELEPHONE (OUTPATIENT)
Dept: PHARMACY | Facility: OTHER | Age: 47
End: 2017-11-16

## 2017-11-16 NOTE — TELEPHONE ENCOUNTER
MTM referral from: [facility] visit (referral by provider)    MTM referral outreach attempt #1 on November 16, 2017 at 2:29 PM      Outcome: No Answer-mailbox full    CHRISTINE Goldberg Coordinator

## 2017-11-20 DIAGNOSIS — E11.65 TYPE 2 DIABETES MELLITUS WITH HYPERGLYCEMIA (H): ICD-10-CM

## 2017-11-20 NOTE — TELEPHONE ENCOUNTER
MTM referral from: [facility] visit (referral by provider)    MTM referral outreach attempt #2 on November 20, 2017 at 4:09 PM      Outcome: Patient is not interested at this time because he does not feel that he needs MTM and he can just work with his MD, will route to MTM Pharmacist/Provider as an FYI. Thank you for the referral.     Nelsy Dawson, MTM Coordinator

## 2017-11-24 RX ORDER — METFORMIN HCL 500 MG
TABLET, EXTENDED RELEASE 24 HR ORAL
Qty: 120 TABLET | Refills: 0 | Status: SHIPPED | OUTPATIENT
Start: 2017-11-24 | End: 2017-12-21

## 2017-11-24 NOTE — TELEPHONE ENCOUNTER
Last Written Prescription Date:  10/17/17  Last Fill Quantity: 120,   # refills: 0  Last Office Visit : 11/14/17  Future Office visit:  NONE

## 2017-12-21 DIAGNOSIS — E11.65 TYPE 2 DIABETES MELLITUS WITH HYPERGLYCEMIA (H): ICD-10-CM

## 2017-12-26 RX ORDER — METFORMIN HCL 500 MG
TABLET, EXTENDED RELEASE 24 HR ORAL
Qty: 360 TABLET | Refills: 1 | Status: SHIPPED | OUTPATIENT
Start: 2017-12-26 | End: 2018-06-19

## 2018-03-27 DIAGNOSIS — E11.9 DM TYPE 2, GOAL HBA1C 7%-8% (H): ICD-10-CM

## 2018-03-28 NOTE — TELEPHONE ENCOUNTER
sitagliptin (JANUVIA) 50 MG tablet  Last Written Prescription Date:  2/28/17  Last Fill Quantity: 90,   # refills: 3  Last Office Visit :11/14/17  Future Office visit:  None    Routing  Because:  labs

## 2018-04-16 DIAGNOSIS — E11.9 DIABETES MELLITUS, TYPE 2 (H): Primary | ICD-10-CM

## 2018-04-16 RX ORDER — LANCETS
EACH MISCELLANEOUS
Qty: 300 EACH | Refills: 1 | Status: SHIPPED | OUTPATIENT
Start: 2018-04-16 | End: 2019-08-02

## 2018-04-17 NOTE — TELEPHONE ENCOUNTER
Last Clinic Visit: 11/14/2017  [facility]

## 2018-06-11 DIAGNOSIS — E11.9 DIABETES MELLITUS, TYPE 2 (H): Primary | ICD-10-CM

## 2018-06-11 DIAGNOSIS — E78.5 HYPERLIPIDEMIA: ICD-10-CM

## 2018-06-11 RX ORDER — SIMVASTATIN 20 MG
20 TABLET ORAL AT BEDTIME
Qty: 90 TABLET | Refills: 0 | Status: SHIPPED | OUTPATIENT
Start: 2018-06-11 | End: 2018-09-04

## 2018-06-11 NOTE — TELEPHONE ENCOUNTER
Last Clinic Visit: 11/14/2017  [facility]    Simvastatin- lab past due LDL.  Kely refill sent for 90 days and FYI to provider.

## 2018-06-19 DIAGNOSIS — E11.65 TYPE 2 DIABETES MELLITUS WITH HYPERGLYCEMIA (H): ICD-10-CM

## 2018-06-20 NOTE — TELEPHONE ENCOUNTER
metFORMIN (GLUCOPHAGE-XR) 500 MG 24 hr tablet    Last Written Prescription Date:  12/26/17  Last Fill Quantity: 360,   # refills: 1  Last Office Visit : 11/14/17  Future Office visit:  none      Scheduling has been notified to contact the pt for appointment.    Routing  Because: labs

## 2018-06-20 NOTE — TELEPHONE ENCOUNTER
Call and left a message for patient to call to schedule annual clinic visit with Dr. Carrera in Primary Care. Give clinic number for scheduling.

## 2018-06-20 NOTE — TELEPHONE ENCOUNTER
----- Message from Kanika Landry RN sent at 6/20/2018  1:34 PM CDT -----  Pt   Otto Hughes [[id number]]    [facility]    Please call to schedule.    Patient is overdue for annual clinic visit - unless otherwise indicated patient needs to be scheduled with 1st available.    Patient may need labs and or imaging - please check with RN care coordinator.    Thanks    I do not need any follow up on the scheduling of this appointment unless the patient will no longer be receiving care in our clinic.

## 2018-06-21 RX ORDER — METFORMIN HCL 500 MG
TABLET, EXTENDED RELEASE 24 HR ORAL
Qty: 120 TABLET | Refills: 0 | Status: SHIPPED | OUTPATIENT
Start: 2018-06-21 | End: 2018-08-09

## 2018-07-02 DIAGNOSIS — N52.8 OTHER MALE ERECTILE DYSFUNCTION: ICD-10-CM

## 2018-07-03 RX ORDER — SILDENAFIL CITRATE 20 MG/1
TABLET ORAL
Qty: 12 TABLET | Refills: 3 | OUTPATIENT
Start: 2018-07-03

## 2018-07-06 ENCOUNTER — TELEPHONE (OUTPATIENT)
Dept: INTERNAL MEDICINE | Facility: CLINIC | Age: 48
End: 2018-07-06

## 2018-07-06 DIAGNOSIS — N52.8 OTHER MALE ERECTILE DYSFUNCTION: ICD-10-CM

## 2018-07-06 NOTE — TELEPHONE ENCOUNTER
[facility] Call Center    Phone Message    May a detailed message be left on voicemail: yes    Reason for Call: Other: Pt returning call to Lanie     Action Taken: Message routed to:  [facility] MAURICIO DOZIER

## 2018-07-06 NOTE — TELEPHONE ENCOUNTER
[facility] Call [facility]    Phone Message    May a detailed message be left on voicemail: yes    Reason for Call: Other: Pt was calling to reach Nurse Lanie again.  Please follow up with pt.  Thank you.     Action Taken: Other: [facility]

## 2018-07-06 NOTE — TELEPHONE ENCOUNTER
[facility] Call [facility]    Phone Message    May a detailed message be left on voicemail: no    Reason for Call: Medication Question or concern regarding medication   Prescription Clarification  Name of Medication: Simvastatin    Prescribing Provider: Debi   Pharmacy: Same as the Metformin prescription   What on the order needs clarification? He was expecting it to be pill form          Action Taken: Other: Prescription submitted, Simvastatin is not in pill form. Please review and f/u with patient and or pharmacy

## 2018-07-09 NOTE — TELEPHONE ENCOUNTER
[facility] Call Center    Phone Message    May a detailed message be left on voicemail: yes    Reason for Call: Other: Pt is requesting a call back from Lanie. He said he would like the prescription tonight if possible      Action Taken: Message routed to:  [facility] TASIA

## 2018-07-10 RX ORDER — SILDENAFIL CITRATE 20 MG/1
TABLET ORAL
Qty: 12 TABLET | Refills: 3 | Status: SHIPPED | OUTPATIENT
Start: 2018-07-10 | End: 2019-08-22

## 2018-07-23 DIAGNOSIS — E11.9 DM TYPE 2, GOAL HBA1C 7%-8% (H): ICD-10-CM

## 2018-07-24 NOTE — TELEPHONE ENCOUNTER
Called patient and left a message to call Primary Care Clinic to schedule annual visit with PCP. Left clinic number to call back to schedule appointment.

## 2018-07-24 NOTE — TELEPHONE ENCOUNTER
sitagliptin (JANUVIA) 50 MG tablet  Last Written Prescription Date:  3/28/18  Last Fill Quantity: 90,   # refills: 0  Last Office Visit : 11/14/17  Future Office visit: none    Scheduling has been notified to contact the pt for appointment.    Routing  Because:  Labs , appt.

## 2018-08-09 DIAGNOSIS — E11.65 TYPE 2 DIABETES MELLITUS WITH HYPERGLYCEMIA (H): ICD-10-CM

## 2018-08-10 RX ORDER — METFORMIN HCL 500 MG
TABLET, EXTENDED RELEASE 24 HR ORAL
Qty: 120 TABLET | Refills: 0 | Status: SHIPPED | OUTPATIENT
Start: 2018-08-10 | End: 2018-09-11

## 2018-08-10 NOTE — TELEPHONE ENCOUNTER
metFORMIN (GLUCOPHAGE-XR) 500 MG 24 hr tablet  Last Written Prescription Date:  6/21/18  Last Fill Quantity: 120,   # refills: 0  Last Office Visit :11/14/17  Future Office visit: none    Routing  Because: labs,   bp past due

## 2018-09-04 DIAGNOSIS — E78.5 HYPERLIPIDEMIA: ICD-10-CM

## 2018-09-04 DIAGNOSIS — E11.9 DM TYPE 2, GOAL HBA1C 7%-8% (H): ICD-10-CM

## 2018-09-05 NOTE — TELEPHONE ENCOUNTER
sitagliptin (JANUVIA) 50 MG tablet  Last Written Prescription Date:  7/25/18  Last Fill Quantity: 30,   # refills: 0  Last Office Visit : 11/14/17  Future Office visit:  9/17/18  (CX 8/21/18)    Routing refill request to provider for review/approval because:  Failed protocol for labs and BP.  Also previous note no further refills until being seen and CX of 8/21 appointment.

## 2018-09-06 RX ORDER — SIMVASTATIN 20 MG
20 TABLET ORAL AT BEDTIME
Qty: 90 TABLET | Refills: 0 | Status: SHIPPED | OUTPATIENT
Start: 2018-09-06 | End: 2018-12-30

## 2018-09-06 NOTE — TELEPHONE ENCOUNTER
simvastatin (ZOCOR) 20 MG tablet  Last Written Prescription Date:  6/11/18  Last Fill Quantity: 90,   # refills: 0  Last Office Visit : 11/14/17  Future Office visit:  9/17/18    Routing because: FYI  Lab past due. 90 day to pharmacy.

## 2018-09-10 DIAGNOSIS — E11.65 TYPE 2 DIABETES MELLITUS WITH HYPERGLYCEMIA (H): ICD-10-CM

## 2018-09-11 ENCOUNTER — TELEPHONE (OUTPATIENT)
Dept: INTERNAL MEDICINE | Facility: CLINIC | Age: 48
End: 2018-09-11

## 2018-09-11 DIAGNOSIS — E11.65 TYPE 2 DIABETES MELLITUS WITH HYPERGLYCEMIA (H): ICD-10-CM

## 2018-09-11 RX ORDER — METFORMIN HCL 500 MG
TABLET, EXTENDED RELEASE 24 HR ORAL
Qty: 120 TABLET | Refills: 0 | Status: CANCELLED | OUTPATIENT
Start: 2018-09-11

## 2018-09-11 RX ORDER — METFORMIN HCL 500 MG
TABLET, EXTENDED RELEASE 24 HR ORAL
Qty: 120 TABLET | Refills: 0 | Status: SHIPPED | OUTPATIENT
Start: 2018-09-11 | End: 2018-10-15

## 2018-09-11 NOTE — TELEPHONE ENCOUNTER
metFORMIN (GLUCOPHAGE-XR) 500 MG 24 hr tablet   Take 4 tablets (2,000 mg) by mouth daily (with dinner). Patient needs to see primary provider and have labs for further refills.  Last Written Prescription Date:  8/10/2018  Last Fill Quantity: 120,   # refills: 0  Last Office Visit :  11/14/2017  Future Office visit:  9/17/2018    Routing refill request to provider for review/approval because:  Labs due. Last A1C 11.2 in 11/4/2017  Pended refill 30 days.

## 2018-09-11 NOTE — TELEPHONE ENCOUNTER
[facility] Call Center    Phone Message    May a detailed message be left on voicemail: yes    Reason for Call: Other: Pt wants to know what he should do until his appt on Monday with Dr Carrera because he is out of his medication     Action Taken: Message routed to:  [facility] Primary Care Clinic

## 2018-09-11 NOTE — TELEPHONE ENCOUNTER
Spoke to patient on the phone, patient is out of metformin and need medication to get him to his appointment on 9/17/18. More Alarcon LPN 9/11/2018 4:14 PM

## 2018-09-17 ENCOUNTER — OFFICE VISIT (OUTPATIENT)
Dept: FAMILY MEDICINE | Facility: CLINIC | Age: 48
End: 2018-09-17
Payer: COMMERCIAL

## 2018-09-17 ENCOUNTER — TELEPHONE (OUTPATIENT)
Dept: GASTROENTEROLOGY | Facility: CLINIC | Age: 48
End: 2018-09-17

## 2018-09-17 VITALS
DIASTOLIC BLOOD PRESSURE: 78 MMHG | SYSTOLIC BLOOD PRESSURE: 133 MMHG | WEIGHT: 161.4 LBS | HEART RATE: 101 BPM | BODY MASS INDEX: 22.51 KG/M2

## 2018-09-17 DIAGNOSIS — E11.9 DM TYPE 2, GOAL HBA1C 7%-8% (H): Primary | ICD-10-CM

## 2018-09-17 DIAGNOSIS — E11.9 TYPE 2 DIABETES MELLITUS WITHOUT COMPLICATION, WITH LONG-TERM CURRENT USE OF INSULIN (H): ICD-10-CM

## 2018-09-17 DIAGNOSIS — Z79.4 TYPE 2 DIABETES MELLITUS WITHOUT COMPLICATION, WITH LONG-TERM CURRENT USE OF INSULIN (H): ICD-10-CM

## 2018-09-17 DIAGNOSIS — E11.9 DM TYPE 2, GOAL HBA1C 7%-8% (H): ICD-10-CM

## 2018-09-17 DIAGNOSIS — Z00.00 ROUTINE GENERAL MEDICAL EXAMINATION AT A HEALTH CARE FACILITY: ICD-10-CM

## 2018-09-17 LAB
ALBUMIN SERPL-MCNC: 3.7 G/DL (ref 3.4–5)
ALP SERPL-CCNC: 76 U/L (ref 40–150)
ALT SERPL W P-5'-P-CCNC: 18 U/L (ref 0–70)
ANION GAP SERPL CALCULATED.3IONS-SCNC: 8 MMOL/L (ref 3–14)
AST SERPL W P-5'-P-CCNC: 12 U/L (ref 0–45)
BILIRUB SERPL-MCNC: 0.7 MG/DL (ref 0.2–1.3)
BUN SERPL-MCNC: 11 MG/DL (ref 7–30)
CALCIUM SERPL-MCNC: 8.7 MG/DL (ref 8.5–10.1)
CHLORIDE SERPL-SCNC: 105 MMOL/L (ref 94–109)
CHOLEST SERPL-MCNC: 127 MG/DL
CO2 SERPL-SCNC: 23 MMOL/L (ref 20–32)
CREAT SERPL-MCNC: 0.66 MG/DL (ref 0.66–1.25)
CREAT UR-MCNC: 87 MG/DL
GFR SERPL CREATININE-BSD FRML MDRD: >90 ML/MIN/1.7M2
GLUCOSE SERPL-MCNC: 292 MG/DL (ref 70–99)
HBA1C MFR BLD: 11 % (ref 0–5.6)
HDLC SERPL-MCNC: 40 MG/DL
LDLC SERPL CALC-MCNC: 61 MG/DL
MICROALBUMIN UR-MCNC: 11 MG/L
MICROALBUMIN/CREAT UR: 12.54 MG/G CR (ref 0–17)
NONHDLC SERPL-MCNC: 87 MG/DL
POTASSIUM SERPL-SCNC: 3.8 MMOL/L (ref 3.4–5.3)
PROT SERPL-MCNC: 7.6 G/DL (ref 6.8–8.8)
SODIUM SERPL-SCNC: 136 MMOL/L (ref 133–144)
TRIGL SERPL-MCNC: 128 MG/DL
TSH SERPL DL<=0.005 MIU/L-ACNC: 0.89 MU/L (ref 0.4–4)

## 2018-09-17 RX ORDER — BLOOD-GLUCOSE METER
KIT MISCELLANEOUS
Qty: 1 KIT | Refills: 0 | Status: SHIPPED | OUTPATIENT
Start: 2018-09-17 | End: 2020-02-04

## 2018-09-17 ASSESSMENT — PAIN SCALES - GENERAL: PAINLEVEL: EXTREME PAIN (8)
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
## 2018-09-17 NOTE — MR AVS SNAPSHOT
After Visit Summary   9/17/2018    Otto Hughes    MRN: [id number]           Patient Information     Date Of Birth          1970        Visit Information        Provider Department      9/17/2018 11:50 AM Antelmo Carrera MD [facility]        Today's Diagnoses     DM type 2, goal HbA1c 7%-8% (H)    -  1    Routine general medical examination at a health care facility        Type 2 diabetes mellitus without complication, with long-term current use of insulin (H)          Care Instructions    Primary Care Center Medication Refill Request Information:  * Please contact your pharmacy regarding ANY request for medication refills.  ** [facility] Prescription Fax = [phone]  * Please allow 3 business days for routine medication refills.  * Please allow 5 business days for controlled substance medication refills.     Primary Care Center Test Result notification information:  *You will be notified with in 7-10 days of your appointment day regarding the results of your test.  If you are on MyChart you will be notified as soon as the provider has reviewed the results and signed off on them.    [facility]: [phone]             Follow-ups after your visit        Additional Services     GASTROENTEROLOGY ADULT REF PROCEDURE ONLY       Last Lab Result: Creatinine (mg/dL)       Date                     Value                 01/31/2017               0.83             ----------  Body mass index is 22.51 kg/(m^2).     Needed:  No  Language:  English    Patient will be contacted to schedule procedure.     Please be aware that coverage of these services is subject to the terms and limitations of your health insurance plan.  Call member services at your health plan with any benefit or coverage questions.  Any procedures must be performed at a [place] facility OR coordinated by your clinic's referral office.    Please bring the following with you to your appointment:    (1) Any  X-Rays, CTs or MRIs which have been performed.  Contact the facility where they were done to arrange for  prior to your scheduled appointment.    (2) List of current medications   (3) This referral request   (4) Any documents/labs given to you for this referral                  Future tests that were ordered for you today     Open Future Orders        Priority Expected Expires Ordered    Hemoglobin A1c Routine 2018 10/1/2018 2018    TSH with free T4 reflex Routine 2018 10/1/2018 2018    Comprehensive metabolic panel Routine 2018 10/1/2018 2018    Lipid panel reflex to direct LDL Fasting Routine 2018 10/1/2018 2018            Who to contact     Please call your clinic at [phone] to:    Ask questions about your health    Make or cancel appointments    Discuss your medicines    Learn about your test results    Speak to your doctor            Additional Information About Your Visit        [employer] Information     [employer] is an electronic gateway that provides easy, online access to your medical records. With [employer], you can request a clinic appointment, read your test results, renew a prescription or communicate with your care team.     To sign up for [employer] visit the website at [email or web address]   You will be asked to enter the access code listed below, as well as some personal information. Please follow the directions to create your username and password.     Your access code is: [email or web address]  Expires: 2018  6:30 AM     Your access code will  in 90 days. If you need help or a new code, please contact your [facility] or call [phone] for assistance.        Care EveryWhere ID     This is your Care EveryWhere ID. This could be used by other organizations to access your [place] medical records  [id number]        Your Vitals Were     Pulse BMI (Body Mass Index)                101 22.51 kg/m2           Blood  Pressure from Last 3 Encounters:   09/17/18 133/78   11/14/17 124/81   02/28/17 111/77    Weight from Last 3 Encounters:   09/17/18 73.2 kg (161 lb 6.4 oz)   11/14/17 76.4 kg (168 lb 6.4 oz)   02/28/17 74.7 kg (164 lb 11.2 oz)              We Performed the Following     Albumin Random Urine Quantitative with Creat Ratio     GASTROENTEROLOGY ADULT REF PROCEDURE ONLY          Where to get your medicines      These medications were sent to [facility] - [place], [place] - [address]  [address], [facility] [address]     Phone:  [phone]     blood glucose monitoring meter device kit          Primary Care Provider Office Phone # Fax #    Antelmo Carrera [phone]        [address] [address]        Equal Access to Services     RITSEH CRUZ : Hadii aad ku hadasho Somaryellen, waaxda luqadaha, qaybta kaalmada adeegyada, adry britt . So [facility] [phone].    ATENCIÓN: Si habla español, tiene a jones disposición servicios gratuitos de asistencia lingüística. Llame al [phone].    We comply with applicable federal civil rights laws and [place] laws. We do not discriminate on the basis of race, color, national origin, age, disability, sex, sexual orientation, or gender identity.            Thank you!     Thank you for choosing [facility]  for your care. Our goal is always to provide you with excellent care. Hearing back from our patients is one way we can continue to improve our services. Please take a few minutes to complete the written survey that you may receive in the mail after your visit with us. Thank you!             Your Updated Medication List - Protect others around you: Learn how to safely use, store and throw away your medicines at www.disposemymeds.org.          This list is accurate as of 9/17/18 12:11 PM.  Always use your most recent med list.                   Brand Name Dispense  Instructions for use Diagnosis    ASPIRIN CHILDRENS 81 MG chewable tablet   Generic drug:  aspirin      Take 81 mg by mouth daily.        * blood glucose monitoring lancets     1 Box    1 each by In Vitro route 3 times daily Use as directed    Diabetes mellitus, type 2 (H)       * blood glucose monitoring lancets     300 each    Use to test blood sugar 3 times daily or as directed.    Diabetes mellitus, type 2 (H)       blood glucose monitoring meter device kit     1 kit    Use to test blood sugars daily as directed.    Type 2 diabetes mellitus without complication, with long-term current use of insulin (H)       * blood glucose monitoring test strip    FREESTYLE LITE    100 strip    Use to test blood sugars 3 times daily or as directed.    Diabetes mellitus, type 2 (H)       * blood glucose monitoring test strip    no brand specified    300 strip    Use to test blood sugars 3 times daily or as directed.    Diabetes mellitus, type 2 (H)       clobetasol 0.05 % ointment    TEMOVATE    60 g    Apply topically 2 times daily Apply to nodules that have been picked at and cover with bandaid. Avoid face, underarms, and groin folds.    Prurigo nodularis       clotrimazole-betamethasone cream    LOTRISONE    45 g    Apply topically 2 times daily Apply to rash behind knees    Rash and nonspecific skin eruption       insulin glargine 100 UNIT/ML injection    LANTUS    3 mL    Inject 10 Units Subcutaneous At Bedtime Patient needs to see primary provider and have labs for further refills.    Diabetes mellitus, type 2 (H)       insulin pen needle 31G X 8 MM     30 each    Use daily to inject Lantus. Please make ANNUAL exam with PRIMARY provider for continued refills.    Type II or unspecified type diabetes mellitus without mention of complication, not stated as uncontrolled       metFORMIN 500 MG 24 hr tablet    GLUCOPHAGE-XR    120 tablet    Take 4 tablets (2,000 mg) by mouth daily (with dinner). Patient needs to see primary  provider and have labs for further refills.    Type 2 diabetes mellitus with hyperglycemia (H)       sildenafil 100 MG tablet    VIAGRA    4 tablet    Take 0.5-1 tablets ( mg) by mouth daily as needed for erectile dysfunction Take 30 min to 4 hours before intercourse.  Never use with nitroglycerin, terazosin or doxazosin.    Other male erectile dysfunction       sildenafil 20 MG tablet    REVATIO    12 tablet    Take 1-2 po qd prn    Other male erectile dysfunction       simvastatin 20 MG tablet    ZOCOR    90 tablet    Take 1 tablet (20 mg) by mouth At Bedtime    Hyperlipidemia       sitagliptin 50 MG tablet    JANUVIA    15 tablet    Take 1 tablet (50 mg) by mouth daily . Patient needs to see primary provider and have labs for further refills.    DM type 2, goal HbA1c 7%-8% (H)       tadalafil 20 MG tablet    CIALIS    12 tablet    Take 1 tablet (20 mg) by mouth daily as needed for erectile dysfunction    Other male erectile dysfunction, Type 2 diabetes mellitus without complication (H)       triamcinolone 0.1 % ointment    KENALOG    454 g    Apply topically 2 times daily To sites of dry, red, itchy skin.    Atopic neurodermatitis       * Notice:  This list has 4 medication(s) that are the same as other medications prescribed for you. Read the directions carefully, and ask your doctor or other care provider to review them with you.
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
## 2018-09-18 ENCOUNTER — TELEPHONE (OUTPATIENT)
Dept: GASTROENTEROLOGY | Facility: CLINIC | Age: 48
End: 2018-09-18

## 2018-09-19 ENCOUNTER — TELEPHONE (OUTPATIENT)
Dept: GASTROENTEROLOGY | Facility: CLINIC | Age: 48
End: 2018-09-19

## 2018-09-19 ENCOUNTER — TELEPHONE (OUTPATIENT)
Dept: INTERNAL MEDICINE | Facility: CLINIC | Age: 48
End: 2018-09-19

## 2018-09-19 DIAGNOSIS — E11.9 DIABETES MELLITUS, TYPE 2 (H): Primary | ICD-10-CM

## 2018-09-19 NOTE — TELEPHONE ENCOUNTER
Spoke with pt, informed the results and recommendations as below. MPM referral placed. Pt will be contacted for the scheduling.    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

## 2018-09-20 ENCOUNTER — TELEPHONE (OUTPATIENT)
Dept: PHARMACY | Facility: OTHER | Age: 48
End: 2018-09-20

## 2018-09-20 NOTE — TELEPHONE ENCOUNTER
MTM referral from: [facility] visit (referral by provider)    MTM referral outreach attempt #2 on September 20, 2018 at 2:18 PM      Outcome: Patient not reachable after several attempts, will route to MTM Pharmacist/Provider as an FYI. Thank you for the referral.    Nelsy Dawson, MTM Coordinator

## 2018-09-20 NOTE — LETTER
September 25, 2018        Otto Hughes  [address] [address]      Dear Otto,    Dr. Carrera has recommended you schedule a Medication Therapy Management (MTM) appointment. MTM is designed to help you get the most of out of your medicines. Dr. Carrera specifically focus on your diabetes and working on getting under better control.     During an MTM appointment a specially trained pharmacist will review all of your medicines, both prescription and over-the-counter. They will make sure your medicines are the best choice for you and are safe and convenient for you.  MTM pharmacists work together with you and your doctor to help you understand your medicines, solve any problems related to your medicines and help you get the best results from taking your medicines.     At [facility], we strongly believe in a team approach to health care. We want to help you understand your medicines and health conditions. To learn more about how you might benefit from MTM services, watch the patient video at [email or web address].     To make an appointment, please call the clinic at [phone] or the MTM scheduling line at [phone] (toll-free at [phone]).    We look forward to hearing from you!        Josephine Mendez, Pharm.D., [facility]  Medication Therapy Management Pharmacist  Page/VM:  [phone]

## 2018-10-05 DIAGNOSIS — E11.9 DM TYPE 2, GOAL HBA1C 7%-8% (H): ICD-10-CM

## 2018-10-05 NOTE — TELEPHONE ENCOUNTER
[facility] Call Center    Phone Message    May a detailed message be left on voicemail: yes    Reason for Call: Medication Refill Request    Has the patient contacted the pharmacy for the refill? Yes   Name of medication being requested: Juanuvia 50mg tablets  Provider who prescribed the medication: Debi  [facility] [place] [address]  Date medication is needed: 10/5         Action Taken: Message routed to:  [facility] Med Refill Team

## 2018-10-08 ENCOUNTER — TELEPHONE (OUTPATIENT)
Dept: INTERNAL MEDICINE | Facility: CLINIC | Age: 48
End: 2018-10-08

## 2018-10-08 NOTE — TELEPHONE ENCOUNTER
[facility] Call Center    Phone Message    May a detailed message be left on voicemail: yes    Reason for Call: Other: PT is following up on his request to switch to something other than sitagliptin (JANUVIA) as it is too expensive.  PT states he is completely out of medication and would like a call back.       Action Taken: Message routed to:  [facility] PCC

## 2018-10-08 NOTE — TELEPHONE ENCOUNTER
[facility] Call Center    Phone Message    May a detailed message be left on voicemail: yes    Reason for Call: Medication Question or concern regarding medication   Prescription Clarification  Name of Medication: sitagliptin (JANUVIA) 50 MG tablet  Prescribing Provider: SURAJ   Pharmacy: [facility] - [place], [place] - [address] [place] [address]   What on the order needs clarification? Hector from the pharmacy states that the Pt's copay card that he was using was only good for 12 months which has now  and pt can no longer afford the medication. There is an alternative call TRADJENTA that is similar to Januvia and the manufacture does have a copay card that doesn't have an expiration date if the provider agrees to prescribe this Rx. Please f/u.    Action Taken: Message routed to:  [facility]

## 2018-10-15 DIAGNOSIS — E11.65 TYPE 2 DIABETES MELLITUS WITH HYPERGLYCEMIA (H): ICD-10-CM

## 2018-10-16 RX ORDER — METFORMIN HCL 500 MG
TABLET, EXTENDED RELEASE 24 HR ORAL
Qty: 360 TABLET | Refills: 1 | Status: SHIPPED | OUTPATIENT
Start: 2018-10-16 | End: 2019-02-17

## 2018-12-27 DIAGNOSIS — E78.5 HYPERLIPIDEMIA: ICD-10-CM

## 2018-12-30 RX ORDER — SIMVASTATIN 20 MG
20 TABLET ORAL AT BEDTIME
Qty: 90 TABLET | Refills: 2 | Status: SHIPPED | OUTPATIENT
Start: 2018-12-30 | End: 2019-09-23

## 2019-02-15 DIAGNOSIS — E11.65 TYPE 2 DIABETES MELLITUS WITH HYPERGLYCEMIA (H): ICD-10-CM

## 2019-02-17 RX ORDER — METFORMIN HCL 500 MG
TABLET, EXTENDED RELEASE 24 HR ORAL
Qty: 360 TABLET | Refills: 0 | Status: SHIPPED | OUTPATIENT
Start: 2019-02-17 | End: 2019-05-29

## 2019-02-17 NOTE — TELEPHONE ENCOUNTER
Last Clinic Visit: 9/17/2018  [facility]    Lab(s) past due-A1c not within the specified period of time..  Kely refill 90 days and FYI to [facility] clinic CMA.

## 2019-05-28 DIAGNOSIS — E11.65 TYPE 2 DIABETES MELLITUS WITH HYPERGLYCEMIA (H): ICD-10-CM

## 2019-05-29 RX ORDER — METFORMIN HCL 500 MG
TABLET, EXTENDED RELEASE 24 HR ORAL
Qty: 120 TABLET | Refills: 0 | Status: SHIPPED | OUTPATIENT
Start: 2019-05-29 | End: 2019-09-05

## 2019-05-29 NOTE — TELEPHONE ENCOUNTER
Last Clinic Visit: 9/17/2018 [facility]  Next Clinic Visit: 6-13-19 FYI to Clinic CMA Overdue lab: HGBA1C, Previous 90 day nasir given.  30 day RF sent to pharm

## 2019-08-01 DIAGNOSIS — E11.9 DIABETES MELLITUS, TYPE 2 (H): ICD-10-CM

## 2019-08-02 ENCOUNTER — TELEPHONE (OUTPATIENT)
Dept: FAMILY MEDICINE | Facility: CLINIC | Age: 49
End: 2019-08-02

## 2019-08-02 RX ORDER — LANCETS
EACH MISCELLANEOUS
Qty: 300 EACH | Refills: 0 | Status: SHIPPED | OUTPATIENT
Start: 2019-08-02 | End: 2020-12-29

## 2019-08-02 NOTE — TELEPHONE ENCOUNTER
Last Clinic Visit: 9/17/2018 [facility]  Scheduling has been notified to contact the pt for appointment.

## 2019-08-21 DIAGNOSIS — N52.8 OTHER MALE ERECTILE DYSFUNCTION: ICD-10-CM

## 2019-08-22 RX ORDER — SILDENAFIL CITRATE 20 MG/1
TABLET ORAL
Qty: 12 TABLET | Refills: 0 | Status: SHIPPED | OUTPATIENT
Start: 2019-08-22 | End: 2019-10-29

## 2019-08-22 NOTE — TELEPHONE ENCOUNTER
Last Clinic Visit: 9/17/2018  [facility]

## 2019-09-05 DIAGNOSIS — E11.65 TYPE 2 DIABETES MELLITUS WITH HYPERGLYCEMIA (H): ICD-10-CM

## 2019-09-05 NOTE — TELEPHONE ENCOUNTER
[facility] Call Center    Phone Message    May a detailed message be left on voicemail: no    Reason for Call: Other: .  pt is asking we refill this medication since he has an upcomming appt with .    Action Taken: Message routed to:  [facility] TASIA

## 2019-09-06 RX ORDER — METFORMIN HCL 500 MG
TABLET, EXTENDED RELEASE 24 HR ORAL
Qty: 56 TABLET | Refills: 0 | Status: SHIPPED | OUTPATIENT
Start: 2019-09-06 | End: 2019-09-23

## 2019-09-06 NOTE — TELEPHONE ENCOUNTER
Last Clinic Visit: 9/17/2018 [facility]  Previous 90 day, 30 day RF given  14 day RF sent to pharm  Overdue Hgb A1c- order in epic, needs lab appt: LUCY to Clinic CMA  Next Clinic Appt 9-17-19

## 2019-09-23 ENCOUNTER — TELEPHONE (OUTPATIENT)
Dept: GASTROENTEROLOGY | Facility: CLINIC | Age: 49
End: 2019-09-23

## 2019-09-23 ENCOUNTER — OFFICE VISIT (OUTPATIENT)
Dept: FAMILY MEDICINE | Facility: CLINIC | Age: 49
End: 2019-09-23
Payer: COMMERCIAL

## 2019-09-23 VITALS
SYSTOLIC BLOOD PRESSURE: 133 MMHG | OXYGEN SATURATION: 97 % | WEIGHT: 155 LBS | DIASTOLIC BLOOD PRESSURE: 81 MMHG | HEART RATE: 80 BPM | BODY MASS INDEX: 21.62 KG/M2

## 2019-09-23 DIAGNOSIS — Z00.00 HEALTH CARE MAINTENANCE: ICD-10-CM

## 2019-09-23 DIAGNOSIS — N52.8 OTHER MALE ERECTILE DYSFUNCTION: ICD-10-CM

## 2019-09-23 DIAGNOSIS — E11.9 DM TYPE 2, GOAL HBA1C 7%-8% (H): Primary | ICD-10-CM

## 2019-09-23 DIAGNOSIS — E11.65 TYPE 2 DIABETES MELLITUS WITH HYPERGLYCEMIA, UNSPECIFIED WHETHER LONG TERM INSULIN USE (H): ICD-10-CM

## 2019-09-23 DIAGNOSIS — R63.4 WEIGHT LOSS: ICD-10-CM

## 2019-09-23 DIAGNOSIS — E78.5 HYPERLIPIDEMIA, UNSPECIFIED HYPERLIPIDEMIA TYPE: ICD-10-CM

## 2019-09-23 RX ORDER — SIMVASTATIN 20 MG
20 TABLET ORAL AT BEDTIME
Qty: 90 TABLET | Refills: 3 | Status: SHIPPED | OUTPATIENT
Start: 2019-09-23 | End: 2020-11-18

## 2019-09-23 RX ORDER — SILDENAFIL 100 MG/1
50-100 TABLET, FILM COATED ORAL DAILY PRN
Qty: 10 TABLET | Refills: 6 | Status: SHIPPED | OUTPATIENT
Start: 2019-09-23 | End: 2019-10-29

## 2019-09-23 RX ORDER — METFORMIN HCL 500 MG
TABLET, EXTENDED RELEASE 24 HR ORAL
Qty: 360 TABLET | Refills: 3 | Status: SHIPPED | OUTPATIENT
Start: 2019-09-23 | End: 2020-09-22

## 2019-09-23 ASSESSMENT — PAIN SCALES - GENERAL: PAINLEVEL: NO PAIN (0)

## 2019-09-23 NOTE — NURSING NOTE
Chief Complaint   Patient presents with     Diabetes     follow up     Kimberly Nissen, EMT at 8:55 AM on 9/23/2019

## 2019-09-23 NOTE — PATIENT INSTRUCTIONS
Ophthalmology [phone] (4th Floor [facility])     Primary Care Center Medication Refill Request Information:  * Please contact your pharmacy regarding ANY request for medication refills.  ** [facility] Prescription Fax = [phone]  * Please allow 3 business days for routine medication refills.  * Please allow 5 business days for controlled substance medication refills.     Primary Care Center Test Result notification information:  *You will be notified with in 7-10 days of your appointment day regarding the results of your test.  If you are on MyChart you will be notified as soon as the provider has reviewed the results and signed off on them.    [facility]: [phone]

## 2019-09-23 NOTE — PROGRESS NOTES
[facility]  Primary Care Center   Antelmo Carrera MD  09/23/2019      Chief Complaint:   Diabetes     History of Present Illness:   Otto Hughes is a 49 year old male with a history of hyperlipidemia and type 2 diabetes mellitus who presents for evaluation of diabetes.    Health maintenance: In the past year, he has not had to go to the hospital or Urgent Care. He has been feeling okay. He will schedule an ophthalmology appointment soon. He wants to schedule a colonoscopy this month. He wants refills for Januvia 50 mg, Metformin 500 mg, Zocor 20 mg, and Viagra 100 mg.       Weight loss: He has not been trying to lose weight, but has lost 6 pounds since his visit on 9/17/18. He does not know why he has lost the weight. He reports a good, healthy appetite. He denies any bowel problems.     Wt Readings from Last 4 Encounters:   09/23/19 70.3 kg (155 lb)   09/17/18 73.2 kg (161 lb 6.4 oz)   11/14/17 76.4 kg (168 lb 6.4 oz)   02/28/17 74.7 kg (164 lb 11.2 oz)     Type 2 diabetes mellitus: He occasionally checks his blood glucose at home and reports values of 250-300. He reports that he is occasionally very thirsty. He is fatigued often. If his glucose is too high today, he is amenable to starting on Insulin again. He has spoken to Jesus the pharmacist in the past about insulin use.     Other concerns discussed:  1. He wants a pneumonia vaccine.      Review of Systems:   Pertinent items are noted in HPI or as in patient entered ROS below, remainder of complete ROS is negative.     Active Medications:     Current Outpatient Medications:      aspirin (ASPIRIN CHILDRENS) 81 MG chewable tablet, Take 81 mg by mouth daily., Disp: , Rfl:      blood glucose (NO BRAND SPECIFIED) test strip, Use to test blood sugars 3 times daily or as directed.For  refills, please schedule a follow-up appt, Disp: 300 strip, Rfl: 0     blood glucose monitoring (FREESTYLE FREEDOM LITE) meter device kit, Use to test blood sugars daily as directed.,  Disp: 1 kit, Rfl: 0     blood glucose monitoring (FREESTYLE LITE) test strip, Use to test blood sugars 3 times daily or as directed., Disp: 100 strip, Rfl: 11     blood glucose monitoring (FREESTYLE) lancets, 1 each by In Vitro route 3 times daily Use as directed, Disp: 1 Box, Rfl: 5     blood glucose monitoring (SOFTCLIX) lancets, Use to test blood sugar 3 times daily or as directed. For  refills, please schedule a follow-up appt, Disp: 300 each, Rfl: 0     clobetasol (TEMOVATE) 0.05 % ointment, Apply topically 2 times daily Apply to nodules that have been picked at and cover with bandaid. Avoid face, underarms, and groin folds., Disp: 60 g, Rfl: 3     clotrimazole-betamethasone (LOTRISONE) cream, Apply topically 2 times daily Apply to rash behind knees, Disp: 45 g, Rfl: 1     insulin glargine (LANTUS) 100 UNIT/ML injection, Inject 10 Units Subcutaneous At Bedtime Patient needs to see primary provider and have labs for further refills., Disp: 3 mL, Rfl: 0     insulin pen needle 31G X 8 MM, Use daily to inject Lantus. Please make ANNUAL exam with PRIMARY provider for continued refills., Disp: 30 each, Rfl: 5     metFORMIN (GLUCOPHAGE-XR) 500 MG 24 hr tablet, Take 4 tablets (2,000 mg) by mouth daily (with dinner)., Disp: 360 tablet, Rfl: 3     sildenafil (REVATIO) 20 MG tablet, Take 1-2 po qd prn.   For  refills, please schedule a follow-up appointment at [phone], Disp: 12 tablet, Rfl: 0     sildenafil (VIAGRA) 100 MG tablet, Take 0.5-1 tablets ( mg) by mouth daily as needed Take 30 min to 4 hours before intercourse.  Never use with nitroglycerin, terazosin or doxazosin., Disp: 10 tablet, Rfl: 6     simvastatin (ZOCOR) 20 MG tablet, Take 1 tablet (20 mg) by mouth At Bedtime, Disp: 90 tablet, Rfl: 3     sitagliptin (JANUVIA) 50 MG tablet, Take 1 tablet (50 mg) by mouth daily, Disp: 90 tablet, Rfl: 3     tadalafil (CIALIS) 20 MG tablet, Take 1 tablet (20 mg) by mouth daily as needed for erectile dysfunction,  Disp: 12 tablet, Rfl: 11     triamcinolone (KENALOG) 0.1 % ointment, Apply topically 2 times daily To sites of dry, red, itchy skin., Disp: 454 g, Rfl: 1      Allergies:   Patient has no known allergies.      Past Medical History:  Hyperlipidemia  Type 2 diabetes mellitus    Past Surgical History:  Reviewed. No surgery history on file.     Family History:   Diabetes: mother  Alcohol/drug: brother     Social History:   Smoking status: former smoker, quit 2008  Alcohol use: no    Physical Exam:   /81   Pulse 80   Wt 70.3 kg (155 lb)   SpO2 97%   BMI 21.62 kg/m     Constitutional: Alert. In no distress.  Head: The scalp, face, and head appear normal.  Musculoskeletal: Extremities appear normal. No gross deformities noted.   Neurologic: Gait normal. Speech is normal and fluent.  Psychiatric: Mentation appears normal. Normal affect.   Cardiovascular: RRR. No murmurs, clicks, gallops, or rub.  Respiratory: Clear to auscultation bilaterally, no wheezes or crackles.     Assessment and Plan:  DM type 2, goal HbA1c 7%-8% (H)  - Comprehensive metabolic panel  - TSH with free T4 reflex  - Albumin Random Urine Quantitative with Creat Ratio  - Lipid panel reflex to direct LDL Fasting  - Hemoglobin A1c  - OPHTHALMOLOGY ADULT REFERRAL  - sitagliptin (JANUVIA) 50 MG tablet  Dispense: 90 tablet; Refill: 3    Weight loss  Likely from elevated sugars.   - CBC with platelets differential    Health care maintenance  - GASTROENTEROLOGY ADULT REF PROCEDURE ONLY [facility] [phone]    Other male erectile dysfunction  - sildenafil (VIAGRA) 100 MG tablet  Dispense: 10 tablet; Refill: 6    Type 2 diabetes mellitus with hyperglycemia (H)  - metFORMIN (GLUCOPHAGE-XR) 500 MG 24 hr tablet  Dispense: 360 tablet; Refill: 3  - simvastatin (ZOCOR) 20 MG tablet  Dispense: 90 tablet; Refill: 3  - sitagliptin (JANUVIA) 50 MG tablet  Dispense: 90 tablet; Refill: 3  - Pneumococcal vaccine 13 valent PCV13 IM (Prevnar)  [[id number]]    Hyperlipidemia  - simvastatin (ZOCOR) 20 MG tablet  Dispense: 90 tablet; Refill: 3    I did tell the patient we need to wait for labs, but I suspect his sugar is too high and that causes tiredness and weight loss. If that is the case, I would like him to work with our pharmacist to resume insulin along with oral medications, and he is okay with this.     Follow-up: No follow-ups on file.      Scribe Disclosure:  Ashley HIGGINBOTHAM, am serving as a scribe to document services personally performed by Antelmo Carrera MD at this visit, based upon the provider's statements to me. All documentation has been reviewed by the aforementioned provider prior to being entered into the official medical record.    Scribe Preparation Attestation:  Ashley HIGGINBOTHAM, a scribe, prepared the chart for today's encounter.      Portions of this medical record were completed by a scribe. UPON MY REVIEW AND AUTHENTICATION BY ELECTRONIC SIGNATURE, this confirms (a) I performed the applicable clinical services, and (b) the record is accurate.   Antelmo Carrera MD MD

## 2019-09-26 ENCOUNTER — TELEPHONE (OUTPATIENT)
Dept: GASTROENTEROLOGY | Facility: CLINIC | Age: 49
End: 2019-09-26

## 2019-09-27 ENCOUNTER — NURSE TRIAGE (OUTPATIENT)
Dept: CALL CENTER | Age: 49
End: 2019-09-27

## 2019-09-27 NOTE — TELEPHONE ENCOUNTER
[facility]: Nurse Triage Note  SITUATION/BACKGROUND                                                      Otto Hughes is a 49 year old male who calls with redness at site of flu shot that he received 9-23-19, wanted to let us know that he had negative reaction to shot.  Per pt:  Description:  Left upper arm, from elbow 1/2 up is red, there is some swelling. Able to move arm. Denies any numbness/tingling in left arm.Taking tylenol 500 mg tabs for discomfort.   On Monday after the shot, he felt dizzy, very cold, felt like he had a fever (did not take temperature) he was unable to work on Tuesday.   All symptoms are improving and he is back at work.  He unable to finish conversation/ triage question as he needed to get back to work. Did review with pt that he would need to seek urgent/emergent care for decrease movement of arm,  Fever > 100.5, if above symptoms worsen. Tylenol should not exceed 500 mg in 24 hours, can apply ice/heat.            RECOMMENDATION/PLAN                                                      RECOMMENDED DISPOSITION:  Unable to finish call as pt needed to return to work.  Will comply with recommendation: N/A    If further questions/concerns or if symptoms do not improve, worsen or new symptoms develop, call your PCP or [phone] to talk with the Resident on call, as soon as possible.    Guideline used: immunization reaction.  Telephone Triage Protocols for Nurses, Fifth Edition, Maria Del Rosario Kaur, RN, RN

## 2019-10-02 ENCOUNTER — TELEPHONE (OUTPATIENT)
Dept: GASTROENTEROLOGY | Facility: CLINIC | Age: 49
End: 2019-10-02

## 2019-10-28 DIAGNOSIS — E11.9 TYPE 2 DIABETES MELLITUS WITHOUT COMPLICATION (H): ICD-10-CM

## 2019-10-28 DIAGNOSIS — E11.65 TYPE 2 DIABETES MELLITUS WITH HYPERGLYCEMIA, UNSPECIFIED WHETHER LONG TERM INSULIN USE (H): ICD-10-CM

## 2019-10-28 DIAGNOSIS — E11.9 DM TYPE 2, GOAL HBA1C 7%-8% (H): ICD-10-CM

## 2019-10-28 DIAGNOSIS — N52.8 OTHER MALE ERECTILE DYSFUNCTION: ICD-10-CM

## 2019-10-29 RX ORDER — SILDENAFIL 100 MG/1
TABLET, FILM COATED ORAL
Qty: 10 TABLET | Refills: 6 | Status: SHIPPED | OUTPATIENT
Start: 2019-10-29 | End: 2019-10-30

## 2019-10-29 RX ORDER — TADALAFIL 20 MG/1
TABLET ORAL
Qty: 12 TABLET | Refills: 11 | OUTPATIENT
Start: 2019-10-29

## 2019-10-29 RX ORDER — SILDENAFIL 100 MG/1
50-100 TABLET, FILM COATED ORAL DAILY PRN
Qty: 10 TABLET | Refills: 6 | Status: SHIPPED | OUTPATIENT
Start: 2019-10-29 | End: 2019-10-29

## 2019-10-29 NOTE — TELEPHONE ENCOUNTER
sildenafil (VIAGRA) 100 MG tablet  Last Written Prescription Date:  9/23/2019  Last Fill Quantity: 10,   # refills: 6  Last Office Visit : 9/23/2019  Future Office visit:  None  Resent order to pharmacy for Pt care.  First order did not make it to the pharmacy for Pt care.    Dahiana Birch RN  Central Triage Red Flags/Med Refills

## 2019-10-29 NOTE — TELEPHONE ENCOUNTER
tadalafil (CIALIS) 20 MG tablet   Last Written Prescription Date:  3/3/16  Last Fill Quantity: 12,   # refills: 11  Last Office Visit : 9/23/19  Future Office visit:  None      Routing refill request to provider for review/approval because: pt currently on  sildenafil (VIAGRA) 100 MG tablet. Both? Please remove from med list if not to be on it. thanks.

## 2019-10-30 DIAGNOSIS — N52.9 ED (ERECTILE DYSFUNCTION): Primary | ICD-10-CM

## 2019-10-30 RX ORDER — SILDENAFIL CITRATE 20 MG/1
TABLET ORAL
Qty: 12 TABLET | Refills: 3 | Status: SHIPPED | OUTPATIENT
Start: 2019-10-30 | End: 2020-12-29

## 2019-10-30 NOTE — TELEPHONE ENCOUNTER
Received a phone call from the pharmacy that generic sildenafil 20 mg is cheaper. Also recent refill of sildenafil 100 mg direction was not clear. I discontinued 100 mg and resent 20 mg.

## 2019-12-20 DIAGNOSIS — E11.65 TYPE 2 DIABETES MELLITUS WITH HYPERGLYCEMIA, UNSPECIFIED WHETHER LONG TERM INSULIN USE (H): ICD-10-CM

## 2019-12-20 DIAGNOSIS — E11.9 DM TYPE 2, GOAL HBA1C 7%-8% (H): ICD-10-CM

## 2019-12-26 ENCOUNTER — TELEPHONE (OUTPATIENT)
Dept: FAMILY MEDICINE | Facility: CLINIC | Age: 49
End: 2019-12-26

## 2019-12-26 DIAGNOSIS — E11.9 DM TYPE 2, GOAL HBA1C 7%-8% (H): Primary | ICD-10-CM

## 2019-12-26 NOTE — TELEPHONE ENCOUNTER
Spoke to pharmacist Pradip from the [facility] pharmacy who states that he needs an Rx for Tradjenta 5 mg tablets, take one tablet by mouth daily, as the patient is trying to get a refill of this medication and they do not have refills on file. Pharmacist states that when he called on 9/23/19 and on 10/28/19 PCP gave a verbal order for the Rx, 30 tablets, 1 refill. Per medication list Januvia has not been discontinued from the patients medication list, however the patient has not been taking Januvia for over a year. Pharmacy first called for a change in this medication on 10/8/18, as Januvia was too expensive for the patient. Encounter was routed to PCP and PCP routed to [facility]. Appears that no Rx was actually placed in patients chart. Pharmacist states that the patient has not been on Januvia and has not filled that Rx. Pharmacist states that the patient has only been filling Tradjenta 5 mg 1 tablet by mouth daily. Rx was sent to pharmacy, will update patients medication list. Pharmacy will relay that the patient will need to schedule an appointment for further refills, as PCP needed patient to get labs and follow up in clinic per appointment on 9/23/19. More Alarcon LPN 12/26/2019 8:54 AM

## 2020-01-23 DIAGNOSIS — E11.65 TYPE 2 DIABETES MELLITUS WITH HYPERGLYCEMIA, UNSPECIFIED WHETHER LONG TERM INSULIN USE (H): ICD-10-CM

## 2020-01-23 DIAGNOSIS — E11.9 DM TYPE 2, GOAL HBA1C 7%-8% (H): ICD-10-CM

## 2020-01-28 DIAGNOSIS — E11.9 DM TYPE 2, GOAL HBA1C 7%-8% (H): ICD-10-CM

## 2020-01-29 NOTE — TELEPHONE ENCOUNTER
Last Clinic Visit: 9/23/19, [place] 2/4/20  Overdue for LDL, A1c, creatinine, microalbumin, 30 day RX provided, routed to clinic to schedule labs

## 2020-02-04 ENCOUNTER — TELEPHONE (OUTPATIENT)
Dept: FAMILY MEDICINE | Facility: CLINIC | Age: 50
End: 2020-02-04

## 2020-02-04 ENCOUNTER — OFFICE VISIT (OUTPATIENT)
Dept: FAMILY MEDICINE | Facility: CLINIC | Age: 50
End: 2020-02-04
Payer: COMMERCIAL

## 2020-02-04 VITALS
SYSTOLIC BLOOD PRESSURE: 133 MMHG | HEART RATE: 87 BPM | TEMPERATURE: 97.8 F | DIASTOLIC BLOOD PRESSURE: 80 MMHG | BODY MASS INDEX: 21.77 KG/M2 | RESPIRATION RATE: 18 BRPM | WEIGHT: 155.5 LBS | HEIGHT: 71 IN

## 2020-02-04 DIAGNOSIS — E11.9 TYPE 2 DIABETES MELLITUS WITHOUT COMPLICATION, WITH LONG-TERM CURRENT USE OF INSULIN (H): ICD-10-CM

## 2020-02-04 DIAGNOSIS — M25.511 RIGHT SHOULDER PAIN, UNSPECIFIED CHRONICITY: Primary | ICD-10-CM

## 2020-02-04 DIAGNOSIS — E11.9 DM TYPE 2, GOAL HBA1C 7%-8% (H): ICD-10-CM

## 2020-02-04 DIAGNOSIS — E11.9 TYPE 2 DIABETES MELLITUS WITHOUT COMPLICATION, UNSPECIFIED WHETHER LONG TERM INSULIN USE (H): ICD-10-CM

## 2020-02-04 DIAGNOSIS — Z79.4 TYPE 2 DIABETES MELLITUS WITHOUT COMPLICATION, WITH LONG-TERM CURRENT USE OF INSULIN (H): ICD-10-CM

## 2020-02-04 LAB
ALBUMIN SERPL-MCNC: 3.7 G/DL (ref 3.4–5)
ALP SERPL-CCNC: 83 U/L (ref 40–150)
ALT SERPL W P-5'-P-CCNC: 21 U/L (ref 0–70)
ANION GAP SERPL CALCULATED.3IONS-SCNC: 3 MMOL/L (ref 3–14)
AST SERPL W P-5'-P-CCNC: 8 U/L (ref 0–45)
BILIRUB SERPL-MCNC: 0.8 MG/DL (ref 0.2–1.3)
BUN SERPL-MCNC: 10 MG/DL (ref 7–30)
CALCIUM SERPL-MCNC: 8.9 MG/DL (ref 8.5–10.1)
CHLORIDE SERPL-SCNC: 106 MMOL/L (ref 94–109)
CHOLEST SERPL-MCNC: 157 MG/DL
CO2 SERPL-SCNC: 30 MMOL/L (ref 20–32)
CREAT SERPL-MCNC: 0.55 MG/DL (ref 0.66–1.25)
GFR SERPL CREATININE-BSD FRML MDRD: >90 ML/MIN/{1.73_M2}
GLUCOSE SERPL-MCNC: 293 MG/DL (ref 70–99)
HBA1C MFR BLD: 13.4 % (ref 0–5.6)
HDLC SERPL-MCNC: 44 MG/DL
LDLC SERPL CALC-MCNC: 77 MG/DL
NONHDLC SERPL-MCNC: 113 MG/DL
POTASSIUM SERPL-SCNC: 4 MMOL/L (ref 3.4–5.3)
PROT SERPL-MCNC: 7.4 G/DL (ref 6.8–8.8)
SODIUM SERPL-SCNC: 140 MMOL/L (ref 133–144)
TRIGL SERPL-MCNC: 180 MG/DL
TSH SERPL DL<=0.005 MIU/L-ACNC: 0.58 MU/L (ref 0.4–4)

## 2020-02-04 RX ORDER — LANCETS 28 GAUGE
1 EACH MISCELLANEOUS 3 TIMES DAILY
Qty: 100 EACH | Refills: 3 | Status: SHIPPED | OUTPATIENT
Start: 2020-02-04 | End: 2020-10-07

## 2020-02-04 RX ORDER — BLOOD-GLUCOSE METER
KIT MISCELLANEOUS
Qty: 1 KIT | Refills: 0 | Status: SHIPPED | OUTPATIENT
Start: 2020-02-04 | End: 2020-12-29

## 2020-02-04 RX ORDER — LISINOPRIL 10 MG/1
10 TABLET ORAL DAILY
Qty: 90 TABLET | Refills: 3 | Status: SHIPPED | OUTPATIENT
Start: 2020-02-04 | End: 2020-12-22

## 2020-02-04 ASSESSMENT — PAIN SCALES - GENERAL: PAINLEVEL: NO PAIN (0)

## 2020-02-04 ASSESSMENT — MIFFLIN-ST. JEOR: SCORE: 1582.47

## 2020-02-04 NOTE — TELEPHONE ENCOUNTER
Attempted to call the patient and mailbox is full, unable to leave voicemail. More Alarcon LPN 2/4/2020 3:28 PM

## 2020-02-04 NOTE — PATIENT INSTRUCTIONS
Primary Care Center Medication Refill Request Information:  * Please contact your pharmacy regarding ANY request for medication refills.  ** [facility] Prescription Fax = [phone]  * Please allow 3 business days for routine medication refills.  * Please allow 5 business days for controlled substance medication refills.     Primary Care Center Test Result notification information:  *You will be notified with in 7-10 days of your appointment day regarding the results of your test.  If you are on MyChart you will be notified as soon as the provider has reviewed the results and signed off on them.

## 2020-02-04 NOTE — PROGRESS NOTES
[facility]  Primary Care Center   Antelmo Carrera MD  02/04/2020      Chief Complaint:   Hypertension; Musculoskeletal Problem; and Recheck Medication       History of Present Illness:   Otto Hughes is a 49 year old male with a history of diabetes mellitus type 2 and hyperlipidemia who presents for a routine follow up.    Health maintenance: He is due for routine labs. He got a flu shot already. He walks 30-60 minutes for exercise. His blood sugar monitor is broken and he needs a new one. His weight has been stable since his last appointment. He has an ophthalmologist and will be seeing them later this month.     Blood pressure: He went to the dentist recently, and was told that his blood pressure was high. His systolic was 155. Today, his blood pressure is 133/80.     Shoulder pain: His right shoulder has been painful with motion for about 6 months. There has been no improvement.      Review of Systems:   Pertinent items are noted in HPI or as in patient entered ROS below, remainder of complete ROS is negative.     Active Medications:     Current Outpatient Medications:      aspirin (ASPIRIN CHILDRENS) 81 MG chewable tablet, Take 81 mg by mouth daily., Disp: , Rfl:      blood glucose (FREESTYLE LITE) test strip, Use to test blood sugars 3 times daily or as directed., Disp: 100 strip, Rfl: 11     blood glucose (NO BRAND SPECIFIED) test strip, Use to test blood sugars 3 times daily or as directed.For  refills, please schedule a follow-up appt, Disp: 300 strip, Rfl: 0     blood glucose monitoring (FREESTYLE FREEDOM LITE) meter device kit, Use to test blood sugars daily as directed., Disp: 1 kit, Rfl: 0     blood glucose monitoring (FREESTYLE) lancets, 1 each by In Vitro route 3 times daily Use as directed, Disp: 100 each, Rfl: 3     blood glucose monitoring (SOFTCLIX) lancets, Use to test blood sugar 3 times daily or as directed. For  refills, please schedule a follow-up appt, Disp: 300 each, Rfl: 0     clobetasol  (TEMOVATE) 0.05 % ointment, Apply topically 2 times daily Apply to nodules that have been picked at and cover with bandaid. Avoid face, underarms, and groin folds., Disp: 60 g, Rfl: 3     clotrimazole-betamethasone (LOTRISONE) cream, Apply topically 2 times daily Apply to rash behind knees, Disp: 45 g, Rfl: 1     insulin glargine (LANTUS) 100 UNIT/ML injection, Inject 10 Units Subcutaneous At Bedtime Patient needs to see primary provider and have labs for further refills., Disp: 3 mL, Rfl: 0     insulin pen needle 31G X 8 MM, Use daily to inject Lantus. Please make ANNUAL exam with PRIMARY provider for continued refills., Disp: 30 each, Rfl: 5     linagliptin (TRADJENTA) 5 MG TABS tablet, Take 1 tablet (5 mg) by mouth daily Please keep appointment  On 2/4/20, Disp: 30 tablet, Rfl: 0     lisinopril (PRINIVIL/ZESTRIL) 10 MG tablet, Take 1 tablet (10 mg) by mouth daily, Disp: 90 tablet, Rfl: 3     metFORMIN (GLUCOPHAGE-XR) 500 MG 24 hr tablet, Take 4 tablets (2,000 mg) by mouth daily (with dinner)., Disp: 360 tablet, Rfl: 3     sildenafil (REVATIO) 20 MG tablet, Take 1-2 tab daily as needed. Take 30 min to 4 hours before intercourse.  Never use with nitroglycerin, terazosin or doxazosin., Disp: 12 tablet, Rfl: 3     simvastatin (ZOCOR) 20 MG tablet, Take 1 tablet (20 mg) by mouth At Bedtime, Disp: 90 tablet, Rfl: 3     triamcinolone (KENALOG) 0.1 % ointment, Apply topically 2 times daily To sites of dry, red, itchy skin., Disp: 454 g, Rfl: 1      Allergies:   Patient has no known allergies.      Past Medical History:  Hyperlipidemia  Diabetes mellitus type 2     Past Surgical History:  Reviewed. No pertinent past surgical history on file.     Family History:   Diabetes: mother  Alcohol/drug: brother       Social History:   Smoking status: former smoker, quit 2008  Alcohol use: no      Physical Exam:   /80 (BP Location: Right arm, Patient Position: Chair, Cuff Size: Adult Regular)   Pulse 87   Temp 97.8  F (36.6  " C) (Oral)   Resp 18   Ht 1.803 m (5' 11\")   Wt 70.5 kg (155 lb 8 oz)   BMI 21.69 kg/m     Constitutional: Alert. In no distress.  Head: The scalp, face, and head appear normal.  Musculoskeletal: Extremities appear normal. No gross deformities noted.   Neurologic: Gait normal. Speech is normal and fluent.  Psychiatric: Mentation appears normal. Normal affect.       Assessment and Plan:  Type 2 diabetes mellitus without complication, with long-term current use of insulin (H)  - Hemoglobin A1c  - Comprehensive metabolic panel  - TSH with free T4 reflex  - Albumin Random Urine Quantitative with Creat Ratio  - Lipid panel reflex to direct LDL Fasting  - blood glucose monitoring (FREESTYLE FREEDOM LITE) meter device kit  Dispense: 1 kit; Refill: 0  - OPHTHALMOLOGY ADULT REFERRAL    Diabetes mellitus, type 2 (H)  - blood glucose (FREESTYLE LITE) test strip  Dispense: 100 strip; Refill: 11  - blood glucose monitoring (FREESTYLE) lancets  Dispense: 100 each; Refill: 3  - lisinopril (PRINIVIL/ZESTRIL) 10 MG tablet  Dispense: 90 tablet; Refill: 3    Right shoulder pain, unspecified chronicity  About 6 months of pain in all directions. He has not been controlling his sugars, so this could be frozen shoulder.   - SPORTS MEDICINE REFERRAL     Elevated blood pressure elsewhere. Normal today, but with diabetes mellitus type 2 he would be on ACE inhibitor anyway, so we will start one. Also he will resume daily baby Aspirin and agrees to see me every 6 months.     Follow-up: No follow-ups on file.         Scribe Disclosure:  Ashley HIGGINBOTHAM, am serving as a scribe to document services personally performed by Antelmo Carrera MD at this visit, based upon the provider's statements to me. All documentation has been reviewed by the aforementioned provider prior to being entered into the official medical record.    Scribe Preparation Attestation:  Ashley HIGGINBOTHAM, a scribe, prepared the chart for today's encounter.      Portions " of this medical record were completed by a scribe. UPON MY REVIEW AND AUTHENTICATION BY ELECTRONIC SIGNATURE, this confirms (a) I performed the applicable clinical services, and (b) the record is accurate.   Antelmo Carrera MD MD

## 2020-02-04 NOTE — TELEPHONE ENCOUNTER
----- Message from Antelmo Carrera MD sent at 2/4/2020  1:10 PM CST -----  Please call him; sugars much too high will need to use insulin, he should see Josephine skelton to start

## 2020-02-04 NOTE — NURSING NOTE
Chief Complaint   Patient presents with     Hypertension     pt. states that he has high bp      Musculoskeletal Problem     pt. states that he has pain in his righ arm      Recheck Medication     pt. would like a glucose monitor        Lashay Matos, EMT

## 2020-02-05 NOTE — TELEPHONE ENCOUNTER
Spoke to patient to relay results. Scheduled appointment for patient with [place] pharmacist. More Alarcon LPN 2/5/2020 3:39 PM

## 2020-02-05 NOTE — TELEPHONE ENCOUNTER
Spoke to a female who states that the pt was not home. Asked to have the patient call the clinic back. More Alarcon LPN 2/5/2020 8:37 AM

## 2020-03-10 DIAGNOSIS — E11.9 DM TYPE 2, GOAL HBA1C 7%-8% (H): ICD-10-CM

## 2020-03-10 RX ORDER — LINAGLIPTIN 5 MG/1
5 TABLET, FILM COATED ORAL DAILY
Qty: 30 TABLET | Refills: 0 | Status: CANCELLED | OUTPATIENT
Start: 2020-03-10

## 2020-03-17 ENCOUNTER — TELEPHONE (OUTPATIENT)
Dept: FAMILY MEDICINE | Facility: CLINIC | Age: 50
End: 2020-03-17

## 2020-03-17 DIAGNOSIS — I10 HYPERTENSION: ICD-10-CM

## 2020-03-17 DIAGNOSIS — E11.9 DM TYPE 2, GOAL HBA1C 7%-8% (H): ICD-10-CM

## 2020-03-17 DIAGNOSIS — I10 HYPERTENSION, BENIGN: Primary | ICD-10-CM

## 2020-03-17 RX ORDER — LINAGLIPTIN 5 MG/1
5 TABLET, FILM COATED ORAL DAILY
Qty: 90 TABLET | Refills: 0 | Status: SHIPPED | OUTPATIENT
Start: 2020-03-17 | End: 2020-06-19

## 2020-03-17 NOTE — TELEPHONE ENCOUNTER
[facility] Call Center    Phone Message    May a detailed message be left on voicemail: yes     Reason for Call: Medication Question or concern regarding medication   Prescription Clarification  Name of Medication: lisinopril (PRINIVIL/ZESTRIL) 10 MG tablet  Prescribing Provider: Dr. Carrera   Pharmacy: [place], [place] - [address] [place] [address]    What on the order needs clarification?Otto calling to report that the medication is causing severe dizziness and has stopped taking it.  He is wanting to know what alternatives there are.  Please call him back to advise          Action Taken: Message routed to:  [facility]  [facility]    Travel Screening: Not Applicable

## 2020-03-17 NOTE — TELEPHONE ENCOUNTER
[facility] Call Center    Phone Message    May a detailed message be left on voicemail: yes     Reason for Call: Medication Refill Request    Has the patient contacted the pharmacy for the refill? Yes   Name of medication being requested: linagliptin (TRADJENTA) 5 MG TABS tablet  Provider who prescribed the medication: Dr. Carrera  Pharmacy: [facility] [address]   Date medication is needed: 3/17/2020.  Refill was initiated on 3/10/2020 by the pharmacy         Action Taken: Message routed to:  [facility]  [facility]    Travel Screening: Not Applicable

## 2020-03-17 NOTE — TELEPHONE ENCOUNTER
Get him prescription for home BP cuff Re; Hypertension    Have him check daily, plus if/when dizzy and make a note of it    Update us in a few weeks with numbers

## 2020-03-17 NOTE — TELEPHONE ENCOUNTER
linagliptin (TRADJENTA) 5 MG TABS tablet  Last Written Prescription Date:  1/29/20  Last Fill Quantity: 30,   # refills: 0  Last Office Visit :2/4/20  Future Office visit:  4/13/20    Creat L      All Reviewers List     Antelmo Carrera MD on 2/4/2020  1:11 PM    Antelmo Carrera MD on 2/4/2020 12:44 PM     No change on med list        Routed because: microalbumin,

## 2020-03-17 NOTE — TELEPHONE ENCOUNTER
Called patient:    Patient still take BP medication but stops only sometimes when he gets dizzy.  Patient does not have a BP monitor at home. Notify patient that dizziness could be the cause of a low blood pressure. With no BP monitor, we are unable to confirm this.        Will call patient back with next step after discussing this with the provider.      Frank Trevizo CMA ([facility]) at 5:28 PM on 3/17/2020

## 2020-03-18 NOTE — TELEPHONE ENCOUNTER
Called patient:    Per Dr. Carrera: If patient keeps on having dizzy spells, have patient stop the lisinopril for 3 days. Then start taking 1/2 tablet by mouth once daily instead.  Have patient monitor BP 1-2 times daily for a few weeks and update us with BP numbers.  We sent a BP cuff and monitor through mail so expect them to arrive in 3-5 business days. Patient gave verbal understanding.      Frank Trevizo CMA ([facility]) at 4:09 PM on 3/18/2020

## 2020-03-18 NOTE — TELEPHONE ENCOUNTER
DME BP cuff and monitor faxed to:    [facility]  [address] [place] [address]  Phone: [phone]  Fax: [phone]    Will take 3-5 business days for patient to receive in mail.    Frank Trevizo CMA ([facility]) at 9:46 AM on 3/18/2020

## 2020-04-09 ENCOUNTER — DOCUMENTATION ONLY (OUTPATIENT)
Dept: CARE COORDINATION | Facility: CLINIC | Age: 50
End: 2020-04-09

## 2020-04-13 ENCOUNTER — TELEPHONE (OUTPATIENT)
Dept: FAMILY MEDICINE | Facility: CLINIC | Age: 50
End: 2020-04-13

## 2020-04-13 ENCOUNTER — VIRTUAL VISIT (OUTPATIENT)
Dept: FAMILY MEDICINE | Facility: CLINIC | Age: 50
End: 2020-04-13
Payer: COMMERCIAL

## 2020-04-13 DIAGNOSIS — I10 HYPERTENSION, BENIGN: Primary | ICD-10-CM

## 2020-04-13 RX ORDER — DIPHENHYD/PHENYLEPH/ACETAMINOP 12.5-5-325
TABLET ORAL
Qty: 1 KIT | Refills: 0 | Status: SHIPPED | OUTPATIENT
Start: 2020-04-13 | End: 2024-09-10

## 2020-04-13 NOTE — PROGRESS NOTES
Called Jasmyn at 8:54 looking for form, she will look for it  Meagan Marquez CMA at 8:55 AM on 4/13/2020.

## 2020-04-13 NOTE — TELEPHONE ENCOUNTER
I called and left VM letting him know that form has been received.   Rossy Carcamo, EMT at 12:17 PM on 4/13/2020.

## 2020-04-13 NOTE — TELEPHONE ENCOUNTER
[facility] Call Center    Phone Message    May a detailed message be left on voicemail: yes     Reason for Call: Other: Pt letting the clinic know that he's faxing his driving form to Dr. Carrera right now to [phone]. Please call pt to him know once it's been received because he send it one time before but the clinic said they didn't receive anything. Thank you.     Action Taken: Message routed to:  [facility]    Travel Screening: Not Applicable

## 2020-04-13 NOTE — TELEPHONE ENCOUNTER
Angelique Chandler to call patient and advise him we did receive the form today and it will be given to Dr. Carrera to complete.    Sakshi Pineda    Primary Care

## 2020-04-13 NOTE — PROGRESS NOTES
"Otto Hughes is a 50 year old male who is being evaluated via a billable telephone visit.      The patient has been notified of following:     \"This telephone visit will be conducted via a call between you and your physician/provider. We have found that certain health care needs can be provided without the need for a physical exam.  This service lets us provide the care you need with a short phone conversation.  If a prescription is necessary we can send it directly to your pharmacy.  If lab work is needed we can place an order for that and you can then stop by our lab to have the test done at a later time.    Telephone visits are billed at different rates depending on your insurance coverage. During this emergency period, for some insurers they may be billed the same as an in-person visit.  Please reach out to your insurance provider with any questions.    If during the course of the call the physician/provider feels a telephone visit is not appropriate, you will not be charged for this service.\"    Patient has given verbal consent for Telephone visit?  Yes    How would you like to obtain your AVS? Mail a copy    Subjective     Otto Hughes is a 50 year old male who presents to clinic today for the following health issues:    DM 2  On insulin  Needs driving form  No insulin reactions or loss of consciousness for any reason  Overall blood sugars running lower, average 200, he plans to start a walking plan    Shoulder pain: relieved well by tylenol, 1000 mg in am, sometimes again in pm.    BP ; agrees to start doing at home    Current Outpatient Medications   Medication     aspirin (ASPIRIN CHILDRENS) 81 MG chewable tablet     blood glucose (FREESTYLE LITE) test strip     blood glucose (NO BRAND SPECIFIED) test strip     blood glucose monitoring (FREESTYLE FREEDOM LITE) meter device kit     blood glucose monitoring (FREESTYLE) lancets     blood glucose monitoring (SOFTCLIX) lancets     clobetasol (TEMOVATE) 0.05 % " ointment     clotrimazole-betamethasone (LOTRISONE) cream     insulin glargine (LANTUS) 100 UNIT/ML injection     insulin pen needle 31G X 8 MM     linagliptin (TRADJENTA) 5 MG TABS tablet     lisinopril (PRINIVIL/ZESTRIL) 10 MG tablet     metFORMIN (GLUCOPHAGE-XR) 500 MG 24 hr tablet     order for DME     sildenafil (REVATIO) 20 MG tablet     simvastatin (ZOCOR) 20 MG tablet     triamcinolone (KENALOG) 0.1 % ointment     No current facility-administered medications for this visit.      No Known Allergies                  Objective   Reported vitals:  There were no vitals taken for this visit.   healthy, alert and no distress  PSYCH: Alert and oriented times 3; coherent speech, normal   rate and volume, able to articulate logical thoughts, able   to abstract reason, no tangential thoughts, no hallucinations   or delusions  His affect is normal  RESP: No cough, no audible wheezing, able to talk in full sentences  Remainder of exam unable to be completed due to telephone visits    Diagnostic Test Results:  Labs reviewed in Epic        Assessment/Plan:  DM 2  On insulin  Needs form filled out for driving  If I'm not in [facility] to sign ok to do for max time, I believe four years  He will start walking program and call in a month to see if can get in person visit and if not then to make another virtual visit    BP: he'll report home BP log then too    Shoulder pain: cont as is, if still hurts once easier to see in person see Sp Med or could do PT, based on last visit note I suspect frozen shoulder         No follow-ups on file.      Phone call duration:  16 minutes    Antelmo Carrera MD MD

## 2020-04-15 NOTE — TELEPHONE ENCOUNTER
Signed form was faxed to [employer] at [phone] on 4/14/20 at 5384.    Nona Velazquez RN ([facility])

## 2020-04-17 NOTE — TELEPHONE ENCOUNTER
[facility] Call Center    Phone Message    May a detailed message be left on voicemail: yes     Reason for Call: Other: Pt would like these forms mailed to his home as well.      Action Taken: Message routed to:  [facility] dayron    Travel Screening: Not Applicable

## 2020-04-24 NOTE — TELEPHONE ENCOUNTER
Called patient:  Patient confirmed that he received copy of signed form through home mail address.  Patient want to know if we have a confirmation that the vehicle services received our fax.  I informed patient that there is no way for us to tell if they received the fax that we sent out on 04/14/20. Patient will have to contact them to check.  We can only confirm that the form was faxed, not received.       Frank Trevizo CMA ([facility]) at 12:56 PM on 4/24/2020

## 2020-04-24 NOTE — TELEPHONE ENCOUNTER
[facility] Call Center    Phone Message    May a detailed message be left on voicemail: yes     Reason for Call: Other: Pt would like to know if fax was received by vehicle services. Please call to discuss     Action Taken: Message routed to:  [facility]    Travel Screening: Not Applicable

## 2020-06-09 ENCOUNTER — VIRTUAL VISIT (OUTPATIENT)
Dept: FAMILY MEDICINE | Facility: CLINIC | Age: 50
End: 2020-06-09
Payer: COMMERCIAL

## 2020-06-09 DIAGNOSIS — E11.9 DM TYPE 2, GOAL HBA1C 7%-8% (H): ICD-10-CM

## 2020-06-09 DIAGNOSIS — M79.601 PAIN OF RIGHT UPPER EXTREMITY: Primary | ICD-10-CM

## 2020-06-09 NOTE — PROGRESS NOTES
"Otto Hughes is a 50 year old male who is being evaluated via a billable telephone visit.      The patient has been notified of following:     \"This telephone visit will be conducted via a call between you and your physician/provider. We have found that certain health care needs can be provided without the need for a physical exam.  This service lets us provide the care you need with a short phone conversation.  If a prescription is necessary we can send it directly to your pharmacy.  If lab work is needed we can place an order for that and you can then stop by our lab to have the test done at a later time.    Telephone visits are billed at different rates depending on your insurance coverage. During this emergency period, for some insurers they may be billed the same as an in-person visit.  Please reach out to your insurance provider with any questions.    If during the course of the call the physician/provider feels a telephone visit is not appropriate, you will not be charged for this service.\"    Patient has given verbal consent for Telephone visit?  Yes    What phone number would you like to be contacted at? [phone]     How would you like to obtain your AVS? Mail a copy    Subjective     Otto Hughes is a 50 year old male who presents via phone visit today for the following health issues:    HPI   1-DM: due for lab, much too high lst check  2-right shoulder area pain getting worse especially w/ movement, especially if raises in air, referred to Sp Med has not seen    No Known Allergies  Current Outpatient Medications   Medication     aspirin (ASPIRIN CHILDRENS) 81 MG chewable tablet     blood glucose (FREESTYLE LITE) test strip     blood glucose (NO BRAND SPECIFIED) test strip     blood glucose monitoring (FREESTYLE FREEDOM LITE) meter device kit     blood glucose monitoring (FREESTYLE) lancets     blood glucose monitoring (SOFTCLIX) lancets     Blood Pressure Monitoring (BLOOD PRESSURE KIT) KIT     " clobetasol (TEMOVATE) 0.05 % ointment     clotrimazole-betamethasone (LOTRISONE) cream     insulin glargine (LANTUS) 100 UNIT/ML injection     insulin pen needle 31G X 8 MM     linagliptin (TRADJENTA) 5 MG TABS tablet     lisinopril (PRINIVIL/ZESTRIL) 10 MG tablet     metFORMIN (GLUCOPHAGE-XR) 500 MG 24 hr tablet     order for DME     sildenafil (REVATIO) 20 MG tablet     simvastatin (ZOCOR) 20 MG tablet     triamcinolone (KENALOG) 0.1 % ointment     No current facility-administered medications for this visit.      Past Medical History:   Diagnosis Date     Hyperlipidemia LDL goal < 100      T2DM (type 2 diabetes mellitus) (H)      Past Surgical History:   Procedure Laterality Date     NO HISTORY OF SURGERY                      Review of Systems          Objective   Reported vitals:  There were no vitals taken for this visit.   healthy, alert and no distress  PSYCH: Alert and oriented times 3; coherent speech, normal   rate and volume, able to articulate logical thoughts, able   to abstract reason, no tangential thoughts, no hallucinations   or delusions  His affect is normal  RESP: No cough, no audible wheezing, able to talk in full sentences  Remainder of exam unable to be completed due to telephone visits    Diagnostic Test Results:  Labs reviewed in Epic        Assessment/Plan:  1. Pain of right upper extremity  Possible frozen shoulder although usually gets better over months this is worse  - Orthopedic & Spine  Referral; Future    2. DM type 2, goal HbA1c 7%-8% (H)  If still high refer to [facility] or endo  - Hemoglobin A1c; Future    No follow-ups on file.      Phone call duration:  12 minutes    Antelmo Carrera MD

## 2020-06-09 NOTE — NURSING NOTE
Chief Complaint   Patient presents with     Shoulder Pain     Pt is experiencing shoulder and back pain, would like to discuss options for medications to help treat it      ARBEN Suazo at 8:04 AM sign on 6/9/2020

## 2020-06-10 ENCOUNTER — TELEPHONE (OUTPATIENT)
Dept: ORTHOPEDICS | Facility: CLINIC | Age: 50
End: 2020-06-10

## 2020-06-10 NOTE — TELEPHONE ENCOUNTER
Called to make an appt for his R shoulder pain.  I helped him schedule for 6/15 at 10AM.     - Yoandy [facility]

## 2020-06-12 ENCOUNTER — TELEPHONE (OUTPATIENT)
Dept: FAMILY MEDICINE | Facility: CLINIC | Age: 50
End: 2020-06-12

## 2020-06-12 NOTE — TELEPHONE ENCOUNTER
Message sent via my chart to schedule appointment AVS sent via my chart  Meagan Marquez CMA at 3:09 PM on 6/12/2020.

## 2020-06-15 ENCOUNTER — ANCILLARY PROCEDURE (OUTPATIENT)
Dept: GENERAL RADIOLOGY | Facility: CLINIC | Age: 50
End: 2020-06-15
Attending: FAMILY MEDICINE
Payer: COMMERCIAL

## 2020-06-15 ENCOUNTER — OFFICE VISIT (OUTPATIENT)
Dept: ORTHOPEDICS | Facility: CLINIC | Age: 50
End: 2020-06-15
Payer: COMMERCIAL

## 2020-06-15 DIAGNOSIS — G89.29 CHRONIC RIGHT SHOULDER PAIN: ICD-10-CM

## 2020-06-15 DIAGNOSIS — M79.601 PAIN OF RIGHT UPPER EXTREMITY: Primary | ICD-10-CM

## 2020-06-15 DIAGNOSIS — M79.601 PAIN OF RIGHT UPPER EXTREMITY: ICD-10-CM

## 2020-06-15 DIAGNOSIS — M25.511 CHRONIC RIGHT SHOULDER PAIN: ICD-10-CM

## 2020-06-15 RX ORDER — DICLOFENAC SODIUM 75 MG/1
75 TABLET, DELAYED RELEASE ORAL 2 TIMES DAILY
Qty: 20 TABLET | Refills: 0 | Status: SHIPPED | OUTPATIENT
Start: 2020-06-15 | End: 2020-06-15

## 2020-06-15 RX ORDER — DICLOFENAC SODIUM 75 MG/1
75 TABLET, DELAYED RELEASE ORAL 2 TIMES DAILY
Qty: 20 TABLET | Refills: 1 | Status: SHIPPED | OUTPATIENT
Start: 2020-06-15 | End: 2020-08-24

## 2020-06-15 NOTE — PROGRESS NOTES
SPORTS & ORTHOPEDIC WALK-IN VISIT 6/15/2020    Primary Care Physician: Dr. Carrera    R shoulder pain began about 1 year ago, he initially attributed it to poor sleeping, but now he is less certain that it is sleeping posture.    Most of the pain is located on the posterior aspect of his R shoulder. Demonstrates decreased ROM in R shoulder compared to L.    Reason for visit:     What part of your body is injured / painful?  right shoulder    What caused the injury /pain? Unsure    How long ago did your injury occur or pain begin? several months ago    What are your most bothersome symptoms? Pain    How would you characterize your symptom?  aching    What makes your symptoms better? Rest    What makes your symptoms worse? Movement    Have you been previously seen for this problem? No    Medical History:    Any recent changes to your medical history? No    Any new medication prescribed since last visit? No    Have you had surgery on this body part before? 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 Yes, some weight loss    Do you have any vision problems? No    Do you have any chest pain or edema? Yes, some chest pain from R shoulder    Do you have any shortness of breath or wheezing?  No    Do you have stomach problems? No    Do you have any numbness or focal weakness? Yes, some weakness due to diabetes    Do you have diabetes? Yes, type 2    Do you have problems with bleeding or clotting? No    Do you have an rashes or other skin lesions? No

## 2020-06-15 NOTE — LETTER
6/15/2020     RE: Otto Hughes  [address] [place] [address]    Dear Colleague,    Thank you for referring your patient, Otto Hughes, to the [facility]IN[facility]. Please see a copy of my visit note below.          SPORTS & ORTHOPEDIC WALK-IN VISIT 6/15/2020    Primary Care Physician: Dr. Carrera    R shoulder pain began about 1 year ago, he initially attributed it to poor sleeping, but now he is less certain that it is sleeping posture.    Most of the pain is located on the posterior aspect of his R shoulder. Demonstrates decreased ROM in R shoulder compared to L.    Reason for visit:     What part of your body is injured / painful?  right shoulder    What caused the injury /pain? Unsure    How long ago did your injury occur or pain begin? several months ago    What are your most bothersome symptoms? Pain    How would you characterize your symptom?  aching    What makes your symptoms better? Rest    What makes your symptoms worse? Movement    Have you been previously seen for this problem? No    Medical History:    Any recent changes to your medical history? No    Any new medication prescribed since last visit? No    Have you had surgery on this body part before? 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 Yes, some weight loss    Do you have any vision problems? No    Do you have any chest pain or edema? Yes, some chest pain from R shoulder    Do you have any shortness of breath or wheezing?  No    Do you have stomach problems? No    Do you have any numbness or focal weakness? Yes, some weakness due to diabetes    Do you have diabetes? Yes, type 2    Do you have problems with bleeding or clotting? No    Do you have an rashes or other skin lesions? No           CHIEF COMPLAINT:  Pain of the Right Shoulder    "    HISTORY OF PRESENT ILLNESS  Mr. Hughes is a pleasant 50 year old year old male who presents to clinic today with chronic right shoulder pain.  No initial inciting or event, insidiously began about 10-11 months ago.  He initially attributed it to poor sleeping, but now he is less certain that it is sleeping posture.  Most of the pain is located on the posterior aspect of his R shoulder. Demonstrates decreased ROM in R shoulder compared to L. Unable to reach above head to wash hair.      Also notes pain \"sometimes\" radiates from posterior right shoulder into arm laterally to hand.  This does not occur frequently.  Not burning, tingling. No weakness.  No neck pain.    History of DM2, uncontrolled.  Has seen Dr. Carrera in past for this , February 2020 but did not follow through with referral to sports medicine.     Reason for visit:     What part of your body is injured / painful?  right shoulder    What caused the injury /pain? Unsure    How long ago did your injury occur or pain begin? several months ago    What are your most bothersome symptoms? Pain    How would you characterize your symptom?  aching    What makes your symptoms better? Rest    What makes your symptoms worse? Movement    Have you been previously seen for this problem? No        Additional history: as documented    MEDICAL HISTORY  Patient Active Problem List   Diagnosis     Hyperlipidemia     T2DM (type 2 diabetes mellitus) (H)       Current Outpatient Medications   Medication Sig Dispense Refill     aspirin (ASPIRIN CHILDRENS) 81 MG chewable tablet Take 81 mg by mouth daily.       blood glucose (FREESTYLE LITE) test strip Use to test blood sugars 3 times daily or as directed. 100 strip 11     blood glucose (NO BRAND SPECIFIED) test strip Use to test blood sugars 3 times daily or as directed.For  refills, please schedule a follow-up appt 300 strip 0     blood glucose monitoring (FREESTYLE FREEDOM LITE) meter device kit Use to test blood sugars " daily as directed. 1 kit 0     blood glucose monitoring (FREESTYLE) lancets 1 each by In Vitro route 3 times daily Use as directed 100 each 3     blood glucose monitoring (SOFTCLIX) lancets Use to test blood sugar 3 times daily or as directed. For  refills, please schedule a follow-up appt 300 each 0     Blood Pressure Monitoring (BLOOD PRESSURE KIT) KIT Take blood pressure daily and keep list for MD visits 1 kit 0     clobetasol (TEMOVATE) 0.05 % ointment Apply topically 2 times daily Apply to nodules that have been picked at and cover with bandaid. Avoid face, underarms, and groin folds. 60 g 3     clotrimazole-betamethasone (LOTRISONE) cream Apply topically 2 times daily Apply to rash behind knees 45 g 1     diclofenac (VOLTAREN) 75 MG EC tablet Take 1 tablet (75 mg) by mouth 2 times daily 20 tablet 1     insulin glargine (LANTUS) 100 UNIT/ML injection Inject 10 Units Subcutaneous At Bedtime Patient needs to see primary provider and have labs for further refills. 3 mL 0     insulin pen needle 31G X 8 MM Use daily to inject Lantus. Please make ANNUAL exam with PRIMARY provider for continued refills. 30 each 5     linagliptin (TRADJENTA) 5 MG TABS tablet Take 1 tablet (5 mg) by mouth daily 90 tablet 0     lisinopril (PRINIVIL/ZESTRIL) 10 MG tablet Take 1 tablet (10 mg) by mouth daily 90 tablet 3     metFORMIN (GLUCOPHAGE-XR) 500 MG 24 hr tablet Take 4 tablets (2,000 mg) by mouth daily (with dinner). 360 tablet 3     order for DME Equipment being ordered: Blood pressure cuff and monitor 1 Device 0     sildenafil (REVATIO) 20 MG tablet Take 1-2 tab daily as needed. Take 30 min to 4 hours before intercourse.  Never use with nitroglycerin, terazosin or doxazosin. 12 tablet 3     simvastatin (ZOCOR) 20 MG tablet Take 1 tablet (20 mg) by mouth At Bedtime 90 tablet 3     triamcinolone (KENALOG) 0.1 % ointment Apply topically 2 times daily To sites of dry, red, itchy skin. 454 g 1       No Known Allergies    Family History    Problem Relation Age of Onset     Diabetes Mother      Alcohol/Drug Brother      Cancer No family hx of      Melanoma No family hx of      Skin Cancer No family hx of        Additional medical/Social/Surgical histories reviewed in [facility] and updated as appropriate.     REVIEW OF SYSTEMS (6/15/2020)  A 10-point review of systems was obtained and is negative except for as noted in the HPI.      PHYSICAL EXAM  There were no vitals taken for this visit.    General  - normal appearance, in no obvious distress  HEENT  - conjunctivae not injected, moist mucous membranes  CV  - normal radial pulse  Pulm  - normal respiratory pattern, non-labored  Musculoskeletal - shoulder  - inspection: normal bone and joint alignment, no obvious deformity, no scapular winging, no AC step-off. Shoulder hiking.  - palpation: no bony or soft tissue tenderness, normal clavicle, non-tender AC  - ROM:  limited flexion, IR, ER, abduction actively and passively  - strength: 5/5  strength, 5/5 elbow strength, 4/5 supraspinatus.  - special tests:  (-) Speed's  (+) Neer pain at endpoint  (+) Hawkin's  (+) Navi's weakness   (-) apprehension  (-) subscap lift-off  Neuro  - no sensory or motor deficit, grossly normal coordination, normal muscle tone  Skin  - no ecchymosis, erythema, warmth, or induration, no obvious rash  Psych  - interactive, appropriate, normal mood and affect    IMAGING : Right shoulder XR 4V. Final results and radiologist's interpretation, available in the [facility] health record. Images were reviewed with the patient/family members in the office today. My personal interpretation of the performed imaging is no acute osseous abnormality or significant degenerative change.    XR cervical - no significant DDD.     ASSESSMENT & PLAN  Mr. Hughes is a 50 year old year old male who presents to clinic today with chronic right shoulder pain x 10 months worsening over the past few months.    Diagnosis:   1. Chronic pain of right shoulder  2.  Adhesive capsulitis of right shoulder  3. Diabetes Mellitus Type 2, uncontrolled    -Start physical therapy; frozen shoulder protocol  -Discussed compliance with HEP  -Reviewed longer recovery course associated with this diagnosis, and importance of PT in recovery.  -Diclofenac BID x 10 days  -Avoid corticosteroid injection or medrol due to A1c >13.4.  -Consider CSI in 1 month, A1C <8  -Follow up 1 month to discuss progress, A1c ordered    It was a pleasure seeing Otto today.    Troy Salazar DO, CAQSM  Primary Care Sports Medicine

## 2020-06-15 NOTE — PROGRESS NOTES
"CHIEF COMPLAINT:  Pain of the Right Shoulder       HISTORY OF PRESENT ILLNESS  Mr. Hughes is a pleasant 50 year old year old male who presents to clinic today with chronic right shoulder pain.  No initial inciting or event, insidiously began about 10-11 months ago.  He initially attributed it to poor sleeping, but now he is less certain that it is sleeping posture.  Most of the pain is located on the posterior aspect of his R shoulder. Demonstrates decreased ROM in R shoulder compared to L. Unable to reach above head to wash hair.      Also notes pain \"sometimes\" radiates from posterior right shoulder into arm laterally to hand.  This does not occur frequently.  Not burning, tingling. No weakness.  No neck pain.    History of DM2, uncontrolled.  Has seen Dr. Carrera in past for this , February 2020 but did not follow through with referral to sports medicine.     Reason for visit:     What part of your body is injured / painful?  right shoulder    What caused the injury /pain? Unsure    How long ago did your injury occur or pain begin? several months ago    What are your most bothersome symptoms? Pain    How would you characterize your symptom?  aching    What makes your symptoms better? Rest    What makes your symptoms worse? Movement    Have you been previously seen for this problem? No        Additional history: as documented    MEDICAL HISTORY  Patient Active Problem List   Diagnosis     Hyperlipidemia     T2DM (type 2 diabetes mellitus) (H)       Current Outpatient Medications   Medication Sig Dispense Refill     aspirin (ASPIRIN CHILDRENS) 81 MG chewable tablet Take 81 mg by mouth daily.       blood glucose (FREESTYLE LITE) test strip Use to test blood sugars 3 times daily or as directed. 100 strip 11     blood glucose (NO BRAND SPECIFIED) test strip Use to test blood sugars 3 times daily or as directed.For  refills, please schedule a follow-up appt 300 strip 0     blood glucose monitoring (FREESTYLE FREEDOM " LITE) meter device kit Use to test blood sugars daily as directed. 1 kit 0     blood glucose monitoring (FREESTYLE) lancets 1 each by In Vitro route 3 times daily Use as directed 100 each 3     blood glucose monitoring (SOFTCLIX) lancets Use to test blood sugar 3 times daily or as directed. For  refills, please schedule a follow-up appt 300 each 0     Blood Pressure Monitoring (BLOOD PRESSURE KIT) KIT Take blood pressure daily and keep list for MD visits 1 kit 0     clobetasol (TEMOVATE) 0.05 % ointment Apply topically 2 times daily Apply to nodules that have been picked at and cover with bandaid. Avoid face, underarms, and groin folds. 60 g 3     clotrimazole-betamethasone (LOTRISONE) cream Apply topically 2 times daily Apply to rash behind knees 45 g 1     diclofenac (VOLTAREN) 75 MG EC tablet Take 1 tablet (75 mg) by mouth 2 times daily 20 tablet 1     insulin glargine (LANTUS) 100 UNIT/ML injection Inject 10 Units Subcutaneous At Bedtime Patient needs to see primary provider and have labs for further refills. 3 mL 0     insulin pen needle 31G X 8 MM Use daily to inject Lantus. Please make ANNUAL exam with PRIMARY provider for continued refills. 30 each 5     linagliptin (TRADJENTA) 5 MG TABS tablet Take 1 tablet (5 mg) by mouth daily 90 tablet 0     lisinopril (PRINIVIL/ZESTRIL) 10 MG tablet Take 1 tablet (10 mg) by mouth daily 90 tablet 3     metFORMIN (GLUCOPHAGE-XR) 500 MG 24 hr tablet Take 4 tablets (2,000 mg) by mouth daily (with dinner). 360 tablet 3     order for DME Equipment being ordered: Blood pressure cuff and monitor 1 Device 0     sildenafil (REVATIO) 20 MG tablet Take 1-2 tab daily as needed. Take 30 min to 4 hours before intercourse.  Never use with nitroglycerin, terazosin or doxazosin. 12 tablet 3     simvastatin (ZOCOR) 20 MG tablet Take 1 tablet (20 mg) by mouth At Bedtime 90 tablet 3     triamcinolone (KENALOG) 0.1 % ointment Apply topically 2 times daily To sites of dry, red, itchy skin.  454 g 1       No Known Allergies    Family History   Problem Relation Age of Onset     Diabetes Mother      Alcohol/Drug Brother      Cancer No family hx of      Melanoma No family hx of      Skin Cancer No family hx of        Additional medical/Social/Surgical histories reviewed in [facility] and updated as appropriate.     REVIEW OF SYSTEMS (6/15/2020)  A 10-point review of systems was obtained and is negative except for as noted in the HPI.      PHYSICAL EXAM  There were no vitals taken for this visit.    General  - normal appearance, in no obvious distress  HEENT  - conjunctivae not injected, moist mucous membranes  CV  - normal radial pulse  Pulm  - normal respiratory pattern, non-labored  Musculoskeletal - shoulder  - inspection: normal bone and joint alignment, no obvious deformity, no scapular winging, no AC step-off. Shoulder hiking.  - palpation: no bony or soft tissue tenderness, normal clavicle, non-tender AC  - ROM:  limited flexion, IR, ER, abduction actively and passively  - strength: 5/5  strength, 5/5 elbow strength, 4/5 supraspinatus.  - special tests:  (-) Speed's  (+) Neer pain at endpoint  (+) Hawkin's  (+) Navi's weakness   (-) apprehension  (-) subscap lift-off  Neuro  - no sensory or motor deficit, grossly normal coordination, normal muscle tone  Skin  - no ecchymosis, erythema, warmth, or induration, no obvious rash  Psych  - interactive, appropriate, normal mood and affect    IMAGING : Right shoulder XR 4V. Final results and radiologist's interpretation, available in the [facility] health record. Images were reviewed with the patient/family members in the office today. My personal interpretation of the performed imaging is no acute osseous abnormality or significant degenerative change.    XR cervical - no significant DDD.     ASSESSMENT & PLAN  Mr. Hughes is a 50 year old year old male who presents to clinic today with chronic right shoulder pain x 10 months worsening over the past few  months.    Diagnosis:   1. Chronic pain of right shoulder  2. Adhesive capsulitis of right shoulder  3. Diabetes Mellitus Type 2, uncontrolled    -Start physical therapy; frozen shoulder protocol  -Discussed compliance with HEP  -Reviewed longer recovery course associated with this diagnosis, and importance of PT in recovery.  -Diclofenac BID x 10 days  -Avoid corticosteroid injection or medrol due to A1c >13.4.  -Consider CSI in 1 month, A1C <8  -Follow up 1 month to discuss progress, A1c ordered    It was a pleasure seeing Otto today.    Troy Salazar DO, CAQSM  Primary Care Sports Medicine

## 2020-06-15 NOTE — PATIENT INSTRUCTIONS
"What to do if you have frozen shoulder    Treatment is focused on relieving pain and restoring your shoulder's normal range of motion. Your doctor may suggest that you take a nonsteroidal anti-inflammatory medication such as aspirin, ibuprofen (Advil, Motrin, generic), or naproxen (Aleve, Naprosyn, generic) to quiet inflammation and ease pain. Sometimes an injection of a corticosteroid into the shoulder joint or nearby soft tissues may be needed.    But the cornerstone of treatment is physical therapy, concentrating on exercises that stretch the joint capsule (see Stretching exercises for a frozen shoulder). Muscle strengthening comes later.    A physical therapist can show you how far to push yourself and teach you the appropriate exercises. Once you've learned your limitations, you can do most of the stretching routine on your own at home.    While you're working to stretch the shoulder capsule, try to avoid any sports or daily activities that increase inflammation or aggravate your pain. If you diligently follow your shoulder-stretching regimen, you'll probably be able to return to your usual level of activity. Full recovery may take several months. If you don't see steady improvement or you reach a plateau, go back to your clinician or consult a shoulder expert.    A small percentage of people need surgery to \"unfreeze\" the shoulder.    Frozen shoulder (also known as adhesive capsulitis) is a condition in which the shoulder is stiff, painful, and has limited motion in all directions    Stretching exercises are usually the cornerstone of treating frozen shoulder.    Always warm up your shoulder before performing your exercises. The best way to do that is to take a warm shower or bath for 10 to 15 minutes. You can also use a moist heating pad or damp towel heated in the microwave, but it may not be as effective.    In performing the following exercises, stretch to the point of tension but not pain.    1. Pendulum " stretch    Do this exercise first. Relax your shoulders. Stand and lean over slightly, allowing the affected arm to hang down. Swing the arm in a small [place] -- about a foot in diameter. Perform 10 revolutions in each direction, once a day. As your symptoms improve, increase the diameter of your swing, but never force it. When you're ready for more, increase the stretch by holding a light weight (three to five pounds) in the swinging arm.    2. Towel stretch    Hold one end of a three-foot-long towel behind your back and grab the opposite end with your other hand. Hold the towel in a horizontal position. Use your good arm to pull the affected arm upward to stretch it. You can also do an advanced version of this exercise with the towel draped over your good shoulder. Hold the bottom of the towel with the affected arm and pull it toward the lower back with the unaffected arm. Do this 10 to 20 times a day.        3. Finger walk    Face a wall three-quarters of an arm's length away. Reach out and touch the wall at waist level with the fingertips of the affected arm. With your elbow slightly bent, slowly walk your fingers up the wall, spider-like, until you've raised your arm as far as you comfortably can. Your fingers should be doing the work, not your shoulder muscles. Slowly lower the arm (with the help of the good arm, if necessary) and repeat. Perform this exercise 10 to 20 times a day.        4. Cross-body reach    Sit or stand. Use your good arm to lift your affected arm at the elbow, and bring it up and across your body, exerting gentle pressure to stretch the shoulder. Hold the stretch for 15 to 20 seconds. Do this 10 to 20 times per day.        5. Armpit stretch    Using your good arm, lift the affected arm onto a shelf about breast-high. Gently bend your knees, opening up the armpit. Deepen your knee bend slightly, gently stretching the armpit, and then straighten. With each knee bend, stretch a little  further, but don't force it. Do this 10 to 20 times each day.    Starting to strengthen    As your range of motion improves, add rotator cuff-strengthening exercises. Be sure to warm up your shoulder and do your stretching exercises before you perform strengthening exercises.        6. Outward rotation    Hold a rubber exercise band between your hands with your elbows at a 90-degree angle close to your sides. Rotate the lower part of the affected arm outward two or three inches and hold for five seconds. Repeat 10 to 15 times, once a day.      7. Inward rotation    Stand next to a closed door, and hook one end of a rubber exercise band around the doorknob. Hold the other end with the hand of the affected arm, holding your elbow at a 90-degree angle. Pull the band toward your body two or three inches and hold for five seconds. Repeat 10 to 15 times, once a day.        For more exercises to improve your balance and prevent falls, increase your flexibility, and even help relieve arthritis, back, and knee pain, buy the [employer] Report Stretchin exercises to improve flexibility and reduce pain.    Reference: [email or web address]/shoulders/stretching-exercises-frozen-shoulder

## 2020-06-19 DIAGNOSIS — E11.9 DM TYPE 2, GOAL HBA1C 7%-8% (H): ICD-10-CM

## 2020-06-19 RX ORDER — LINAGLIPTIN 5 MG/1
5 TABLET, FILM COATED ORAL DAILY
Qty: 90 TABLET | Refills: 0 | Status: SHIPPED | OUTPATIENT
Start: 2020-06-19 | End: 2020-09-22

## 2020-07-13 ENCOUNTER — THERAPY VISIT (OUTPATIENT)
Dept: PHYSICAL THERAPY | Facility: CLINIC | Age: 50
End: 2020-07-13
Attending: FAMILY MEDICINE
Payer: COMMERCIAL

## 2020-07-13 DIAGNOSIS — M25.511 CHRONIC RIGHT SHOULDER PAIN: ICD-10-CM

## 2020-07-13 DIAGNOSIS — G89.29 CHRONIC RIGHT SHOULDER PAIN: ICD-10-CM

## 2020-07-13 PROCEDURE — 97110 THERAPEUTIC EXERCISES: CPT | Mod: GP | Performed by: PHYSICAL THERAPIST

## 2020-07-13 PROCEDURE — 97161 PT EVAL LOW COMPLEX 20 MIN: CPT | Mod: GP | Performed by: PHYSICAL THERAPIST

## 2020-07-13 NOTE — PROGRESS NOTES
Physical Therapy Initial Evaluation: Subjective History     Injury/Condition Details:  Presenting Complaint Right Shoulder   Onset Timing/Date MD referral: Phyllis 15   Mechanism Over 2 years ago, felt he was having shoulder pain due to a sleeping position. Over the 5-6 months, it has become progressively worse.   Has had difficulty managing his DM2 despite exercise.      Symptom Behavior Details    Primary Symptoms Sporadic symptoms; Activity/position dependent, pain (Location: posterior shoulder on the right, pain can extend down to the elbow, Quality: Aching/Throbbing), stiffness   Worst Pain 8/10 (with see below)   Symptom Provocators Reaching overhead  Getting dressed   Putting on a jacket  Sleeping  Trying to itch his back   Best Pain 0/10    Symptom Relievers Resting with hands    Time of day dependent? No   Recent symptom change? no change in symptoms     Prior Testing/Intervention for current condition:  Prior Tests  x-ray   Prior Treatment medication     Lifestyle & General Medical History:  Employment [employer] ()   Usual physical activities  (within past year) Walking 2 hours per day   Orthopaedic history See Epic Chart   Notable medical history See Epic Chart   Patient Reported Health good         Physical Therapy Initial Evaluation: Objective Testing  SHOULDER:    Cervical Screen: Negative    Posture: Forward head, rounded shoulder posture    Shoulder: deferred MMT due to pain   ROM L ROM R   Flexion 150 80   Abduction 160 55   IR T10 R Buttock   ER 42 10   Mid Trap MMT: deferred/5  Low Trap MMT: deferred/5    Scapulothoracic Screen: Deferred      Assessment/Plan:  Patient is a 50 year old male with right side shoulder complaints.    Patient has the following significant findings with corresponding treatment plan.                Diagnosis 1:  Right shoulder pain - adhesive capsulitis  Pain -  hot/cold therapy, manual therapy, STS, splint/taping/bracing/orthotics, self  management and education  Decreased ROM/flexibility - manual therapy, therapeutic exercise and home program  Decreased joint mobility - manual therapy, therapeutic exercise and home program  Impaired muscle performance - neuro re-education and home program  Decreased function - therapeutic activities and home program    Therapy Evaluation Codes:   1) History comprised of:   Personal factors that impact the plan of care:      None.    Comorbidity factors that impact the plan of care are:      None.     Medications impacting care: None.  2) Examination of Body Systems comprised of:   Body structures and functions that impact the plan of care:      Shoulder.   Activity limitations that impact the plan of care are:      Driving, Dressing, Lifting and Sleeping.  3) Clinical presentation characteristics are:   Stable/Uncomplicated.  4) Decision-Making    Low complexity using standardized patient assessment instrument and/or measureable assessment of functional outcome.  Cumulative Therapy Evaluation is: Low complexity.    Previous and current functional limitations:  (See Goal Flow Sheet for this information)    Short term and Long term goals: (See Goal Flow Sheet for this information)     Communication ability:  Patient appears to be able to clearly communicate and understand verbal and written communication and follow directions correctly.  Treatment Explanation - The following has been discussed with the patient:   RX ordered/plan of care  Anticipated outcomes  Possible risks and side effects  This patient would benefit from PT intervention to resume normal activities.   Rehab potential is good.    Frequency:  1 X week, once daily  Duration:  for 6 weeks  Discharge Plan:  Achieve all LTG.  Independent in home treatment program.  Reach maximal therapeutic benefit.    Please refer to the daily flowsheet for treatment today, total treatment time and time spent performing 1:1 timed codes.

## 2020-07-14 ENCOUNTER — OFFICE VISIT (OUTPATIENT)
Dept: ORTHOPEDICS | Facility: CLINIC | Age: 50
End: 2020-07-14
Payer: COMMERCIAL

## 2020-07-14 VITALS — HEIGHT: 71 IN | BODY MASS INDEX: 21.7 KG/M2 | WEIGHT: 155 LBS

## 2020-07-14 DIAGNOSIS — E11.9 TYPE 2 DIABETES MELLITUS WITHOUT COMPLICATION, UNSPECIFIED WHETHER LONG TERM INSULIN USE (H): ICD-10-CM

## 2020-07-14 DIAGNOSIS — M75.01 ADHESIVE CAPSULITIS OF RIGHT SHOULDER: Primary | ICD-10-CM

## 2020-07-14 RX ORDER — MELOXICAM 15 MG/1
15 TABLET ORAL DAILY
Qty: 20 TABLET | Refills: 0 | Status: SHIPPED | OUTPATIENT
Start: 2020-07-14 | End: 2020-12-29

## 2020-07-14 ASSESSMENT — MIFFLIN-ST. JEOR: SCORE: 1575.21

## 2020-07-14 NOTE — LETTER
7/14/2020         RE: Otto Hughes  [address] [facility] [id number]        Dear Colleague,    Thank you for referring your patient, Otto Hughes, to the [facility]ALK [facility] CLINIC. Please see a copy of my visit note below.          SPORTS & ORTHOPEDIC WALK-IN FOLLOW-UP VISIT 7/14/2020    Interval History:     Follow up reason: Right shoulder    Date of injury: Several months ago    Date last seen: 6/15/2020    Following Therapeutic Plan: Yes     Pain: Improving    Function: Improving    Interval History:  The medication seemed to help a bit, he is hoping to have a refill. He started PT yesterday but it was a short appointment and did some strengthening. He is still having difficulty lifting arm and lifting objects.      Medical History:    Any recent changes to your medical history? No    Any new medication prescribed since last visit? No    Review of Systems:    Do you have fever, chills, weight loss? Yes, weight loss    Do you have any vision problems? No    Do you have any chest pain or edema? Yes, chest pain from shoulder    Do you have any shortness of breath or wheezing?  No    Do you have stomach problems? No    Do you have any numbness or focal weakness? Yes, weakness due to diabetes    Do you have diabetes? Yes, type 2    Do you have problems with bleeding or clotting? No    Do you have an rashes or other skin lesions? No             ESTABLISHED PATIENT FOLLOW-UP:  Follow Up of the Right Shoulder     HISTORY OF PRESENT ILLNESS  Mr. Hughes is a pleasant 50 year old year old male who presents to clinic today for follow-up of right shoulder pain.    Date of onset: Chronic  Date last seen: 6/15/20  Following Therapeutic Plan: Yes  Pain: Mild improvement  Function: Unchanged  Interval History: Otto has started physical therapy, but only had his first session yesterday.  Diclofenac script completed, offered some moderate relief.  Has continued difficulty with range of  "motion.    Working to improve his glucose control.  A1c has not been obtained since February 13.4.    Additional medical/Social/Surgical histories reviewed in [facility] and updated as appropriate.    REVIEW OF SYSTEMS (7/14/2020)  CONSTITUTIONAL: Denies fever and weight loss  GASTROINTESTINAL: Denies abdominal pain, nausea, vomiting  MUSCULOSKELETAL: See HPI  SKIN: Denies any recent rash or lesion  NEUROLOGICAL: Denies numbness or focal weakness     PHYSICAL EXAM  Ht 1.803 m (5' 11\")   Wt 70.3 kg (155 lb)   BMI 21.62 kg/m      General  - normal appearance, in no obvious distress  Musculoskeletal - shoulder  - inspection: normal bone and joint alignment, no obvious deformity, no scapular winging, no AC step-off. Shoulder hiking.  - palpation: no bony or soft tissue tenderness, normal clavicle, non-tender AC  - ROM:  limited flexion, IR, ER, abduction actively and passively  - strength: 5/5  strength, 5/5 elbow strength, 4/5 supraspinatus.  - special tests:  (-) Speed's  (+) Neer pain at endpoint  (+) Hawkin's  (+) Navi's weakness   (-) apprehension  (-) subscap lift-off  Neuro  - no sensory or motor deficit, grossly normal coordination, normal muscle tone  Skin  - no ecchymosis, erythema, warmth, or induration, no obvious rash  Psych  - interactive, appropriate, normal mood and affect    IMAGING :  4 views right shoulder radiographs 6/15/2020 12:11 PM     History: Pain of right upper extremity     Comparison: None available.     Findings:     AP, Grashey, transscapular Y, axillary  views of the right shoulder  were obtained.      No acute osseous abnormality. Glenohumeral and acromioclavicular  joints are congruent.     Mild to moderate degenerative changes of the acromioclavicular joint.  No substantial degenerative change of the glenohumeral joint.     Soft tissue is unremarkable. The visualized lung is clear.                                                Impression:  1. No acute osseous abnormality.  2. Mild to " moderate degenerative changes of acromioclavicular joint.     NITHIN SANTOS      ASSESSMENT & PLAN  Mr. Hughes is a 50 year old year old male who presents to clinic today with Follow Up of the Right Shoulder    Diagnosis: Adhesive Capsulitis of Right Shoulder    -Encouraged continued physical therapy; he was made aware that rehabilitation for this condition can be uncomfortable as capsular stretching is required and sometimes unpleasant  -Instructed regarding prolonged recovery time for adhesive capsulitis in setting of uncontrolled DM2.  -Diclofenac BID refilled; DM2 but recent kidney function/albumin acceptable to continue one additional course  -Counseled regarding ongoing glucose control; pursue labs from June for A1c/Albumin.  -Follow up 6 weeks     It was a pleasure seeing Otto.    Troy Salazar DO, CAQSM  Primary Care Sports Medicine

## 2020-07-14 NOTE — PROGRESS NOTES
"ESTABLISHED PATIENT FOLLOW-UP:  Follow Up of the Right Shoulder     HISTORY OF PRESENT ILLNESS  Mr. Hughes is a pleasant 50 year old year old male who presents to clinic today for follow-up of right shoulder pain.    Date of onset: Chronic  Date last seen: 6/15/20  Following Therapeutic Plan: Yes  Pain: Mild improvement  Function: Unchanged  Interval History: Otto has started physical therapy, but only had his first session yesterday.  Diclofenac script completed, offered some moderate relief.  Has continued difficulty with range of motion.    Working to improve his glucose control.  A1c has not been obtained since February 13.4.    Additional medical/Social/Surgical histories reviewed in [facility] and updated as appropriate.    REVIEW OF SYSTEMS (7/14/2020)  CONSTITUTIONAL: Denies fever and weight loss  GASTROINTESTINAL: Denies abdominal pain, nausea, vomiting  MUSCULOSKELETAL: See HPI  SKIN: Denies any recent rash or lesion  NEUROLOGICAL: Denies numbness or focal weakness     PHYSICAL EXAM  Ht 1.803 m (5' 11\")   Wt 70.3 kg (155 lb)   BMI 21.62 kg/m      General  - normal appearance, in no obvious distress  Musculoskeletal - shoulder  - inspection: normal bone and joint alignment, no obvious deformity, no scapular winging, no AC step-off. Shoulder hiking.  - palpation: no bony or soft tissue tenderness, normal clavicle, non-tender AC  - ROM:  limited flexion, IR, ER, abduction actively and passively  - strength: 5/5  strength, 5/5 elbow strength, 4/5 supraspinatus.  - special tests:  (-) Speed's  (+) Neer pain at endpoint  (+) Hawkin's  (+) Navi's weakness   (-) apprehension  (-) subscap lift-off  Neuro  - no sensory or motor deficit, grossly normal coordination, normal muscle tone  Skin  - no ecchymosis, erythema, warmth, or induration, no obvious rash  Psych  - interactive, appropriate, normal mood and affect    IMAGING :  4 views right shoulder radiographs 6/15/2020 12:11 PM     History: Pain of right upper " extremity     Comparison: None available.     Findings:     AP, Grashey, transscapular Y, axillary  views of the right shoulder  were obtained.      No acute osseous abnormality. Glenohumeral and acromioclavicular  joints are congruent.     Mild to moderate degenerative changes of the acromioclavicular joint.  No substantial degenerative change of the glenohumeral joint.     Soft tissue is unremarkable. The visualized lung is clear.                                                Impression:  1. No acute osseous abnormality.  2. Mild to moderate degenerative changes of acromioclavicular joint.     NITHIN SANTOS      ASSESSMENT & PLAN  Mr. Hughes is a 50 year old year old male who presents to clinic today with Follow Up of the Right Shoulder    Diagnosis: Adhesive Capsulitis of Right Shoulder    -Encouraged continued physical therapy; he was made aware that rehabilitation for this condition can be uncomfortable as capsular stretching is required and sometimes unpleasant  -Instructed regarding prolonged recovery time for adhesive capsulitis in setting of uncontrolled DM2.  -Diclofenac BID refilled; DM2 but recent kidney function/albumin acceptable to continue one additional course  -Counseled regarding ongoing glucose control; pursue labs from June for A1c/Albumin.  -Follow up 6 weeks     It was a pleasure seeing Karan Salazar DO, CAM  Primary Care Sports Medicine

## 2020-07-14 NOTE — PROGRESS NOTES
SPORTS & ORTHOPEDIC WALK-IN FOLLOW-UP VISIT 7/14/2020    Interval History:     Follow up reason: Right shoulder    Date of injury: Several months ago    Date last seen: 6/15/2020    Following Therapeutic Plan: Yes     Pain: Improving    Function: Improving    Interval History:  The medication seemed to help a bit, he is hoping to have a refill. He started PT yesterday but it was a short appointment and did some strengthening. He is still having difficulty lifting arm and lifting objects.      Medical History:    Any recent changes to your medical history? No    Any new medication prescribed since last visit? No    Review of Systems:    Do you have fever, chills, weight loss? Yes, weight loss    Do you have any vision problems? No    Do you have any chest pain or edema? Yes, chest pain from shoulder    Do you have any shortness of breath or wheezing?  No    Do you have stomach problems? No    Do you have any numbness or focal weakness? Yes, weakness due to diabetes    Do you have diabetes? Yes, type 2    Do you have problems with bleeding or clotting? No    Do you have an rashes or other skin lesions? No

## 2020-07-21 ENCOUNTER — THERAPY VISIT (OUTPATIENT)
Dept: PHYSICAL THERAPY | Facility: CLINIC | Age: 50
End: 2020-07-21
Payer: COMMERCIAL

## 2020-07-21 DIAGNOSIS — G89.29 CHRONIC RIGHT SHOULDER PAIN: Primary | ICD-10-CM

## 2020-07-21 DIAGNOSIS — M25.511 CHRONIC RIGHT SHOULDER PAIN: Primary | ICD-10-CM

## 2020-07-21 PROCEDURE — 97110 THERAPEUTIC EXERCISES: CPT | Mod: GP | Performed by: PHYSICAL THERAPIST

## 2020-07-31 DIAGNOSIS — M75.01 ADHESIVE CAPSULITIS OF RIGHT SHOULDER: Primary | ICD-10-CM

## 2020-07-31 RX ORDER — DICLOFENAC SODIUM 75 MG/1
75 TABLET, DELAYED RELEASE ORAL 2 TIMES DAILY
Qty: 20 TABLET | Refills: 0 | Status: SHIPPED | OUTPATIENT
Start: 2020-07-31 | End: 2020-12-29

## 2020-08-04 ENCOUNTER — THERAPY VISIT (OUTPATIENT)
Dept: PHYSICAL THERAPY | Facility: CLINIC | Age: 50
End: 2020-08-04
Payer: COMMERCIAL

## 2020-08-04 DIAGNOSIS — G89.29 CHRONIC RIGHT SHOULDER PAIN: Primary | ICD-10-CM

## 2020-08-04 DIAGNOSIS — M25.511 CHRONIC RIGHT SHOULDER PAIN: Primary | ICD-10-CM

## 2020-08-04 PROCEDURE — 97110 THERAPEUTIC EXERCISES: CPT | Mod: GP | Performed by: PHYSICAL THERAPIST

## 2020-08-04 NOTE — PROGRESS NOTES
Shoulder:    ROM Right   Initial ROM R  8/4/2020   Flexion 80 105   Abduction 55 85   IR R Buttock Sacrum   ER 10 20

## 2020-08-19 DIAGNOSIS — G89.29 CHRONIC RIGHT SHOULDER PAIN: ICD-10-CM

## 2020-08-19 DIAGNOSIS — M25.511 CHRONIC RIGHT SHOULDER PAIN: ICD-10-CM

## 2020-08-21 NOTE — TELEPHONE ENCOUNTER
"diclofenac (VOLTAREN) 75 MG EC tablet      Last Written Prescription Date:  7/31/20  Last Fill Quantity: 20,   # refills: 0  Last Office Visit : 7/14/20  Future Office visit:  none    Routing refill request to provider for review/approval because:  ABN Creatinine, no CBC on file  02/04/20  1127    CR 0.55*     Last note  \"Diclofenac BID refilled; DM2 but recent kidney function/albumin acceptable to continue one additional course\"      "

## 2020-08-24 RX ORDER — DICLOFENAC SODIUM 75 MG/1
75 TABLET, DELAYED RELEASE ORAL 2 TIMES DAILY
Qty: 20 TABLET | Refills: 1 | Status: SHIPPED | OUTPATIENT
Start: 2020-08-24 | End: 2020-12-29

## 2020-09-18 DIAGNOSIS — E11.65 TYPE 2 DIABETES MELLITUS WITH HYPERGLYCEMIA, UNSPECIFIED WHETHER LONG TERM INSULIN USE (H): ICD-10-CM

## 2020-09-18 DIAGNOSIS — E11.9 DM TYPE 2, GOAL HBA1C 7%-8% (H): ICD-10-CM

## 2020-09-22 NOTE — TELEPHONE ENCOUNTER
linagliptin (TRADJENTA) 5 MG TABS tablet     Last Written Prescription Date:  6/16/20  Last Fill Quantity: 90,   # refills: 0  Last Office Visit : 6/9/20  Future Office visit:  none    Routing refill request to provider for review/approval because:  Elevated A1C  Lab Results   Component Value Date    A1C 13.4 02/04/2020       metFORMIN (GLUCOPHAGE-XR) 500      Last Written Prescription Date:  9/23/19  Last Fill Quantity: 360,   # refills: 3  Last Office Visit : 6/9/20  Future Office visit:  none    Routing refill request to provider for review/approval because:  Elevated A1c  Order for blood draw in Epic  Thank-you, Margo LOPES RN Medication Refill Team

## 2020-09-23 RX ORDER — LINAGLIPTIN 5 MG/1
5 TABLET, FILM COATED ORAL DAILY
Qty: 30 TABLET | Refills: 0 | Status: SHIPPED | OUTPATIENT
Start: 2020-09-23 | End: 2020-10-29

## 2020-09-23 RX ORDER — METFORMIN HCL 500 MG
TABLET, EXTENDED RELEASE 24 HR ORAL
Qty: 120 TABLET | Refills: 0 | Status: SHIPPED | OUTPATIENT
Start: 2020-09-23 | End: 2020-10-29

## 2020-10-07 DIAGNOSIS — E11.9 TYPE 2 DIABETES MELLITUS WITHOUT COMPLICATION, UNSPECIFIED WHETHER LONG TERM INSULIN USE (H): ICD-10-CM

## 2020-10-07 RX ORDER — LANCETS 28 GAUGE
EACH MISCELLANEOUS
Qty: 300 EACH | Refills: 3 | Status: SHIPPED | OUTPATIENT
Start: 2020-10-07 | End: 2020-12-29

## 2020-10-26 DIAGNOSIS — E11.9 DM TYPE 2, GOAL HBA1C 7%-8% (H): ICD-10-CM

## 2020-10-26 DIAGNOSIS — E11.65 TYPE 2 DIABETES MELLITUS WITH HYPERGLYCEMIA, UNSPECIFIED WHETHER LONG TERM INSULIN USE (H): ICD-10-CM

## 2020-10-27 ENCOUNTER — TELEPHONE (OUTPATIENT)
Dept: FAMILY MEDICINE | Facility: CLINIC | Age: 50
End: 2020-10-27

## 2020-10-27 DIAGNOSIS — E11.9 DM TYPE 2, GOAL HBA1C 7%-8% (H): Primary | ICD-10-CM

## 2020-10-27 NOTE — TELEPHONE ENCOUNTER
[facility] Call Center    Phone Message    May a detailed message be left on voicemail: yes     Reason for Call: Other: Appointment: Medication Refill: Per Call Center Guidelines all medication refill/check appointments are to be reviewed by the clinic. Please review and contact patient to schedule an appointment with Dr. yoo for a refill on his linagliptin (TRADJENTA) 5 MG TABS tablet and metFORMIN (GLUCOPHAGE-XR) 500 MG 24 hr tablet. Patient states he was advised from the Pharmacy to make an appointment.    Action Taken: Message routed to:  [facility]    Travel Screening: Not Applicable

## 2020-10-28 NOTE — TELEPHONE ENCOUNTER
schedule appointment for medication management-[facility]. Patient schedule for Monday Nov 2 at 9:30 AM.

## 2020-10-29 NOTE — TELEPHONE ENCOUNTER
linagliptin (TRADJENTA) 5 MG TABS tablet    Last Written Prescription Date:  9/23/2020  Last Fill Quantity: 30,   # refills: 0  Last Office Visit : 6/9/2020  Future Office visit:  11/2/2020  Routing refill request to provider for review/approval because:  Third Request        Abnormal A1C       Refer to clinic for review  Recent Labs   Lab Test 02/04/20  1127   A1C 13.4*         metFORMIN (GLUCOPHAGE-XR) 500 MG 24 hr tablet  Last Written Prescription Date:  9/23/2020  Last Fill Quantity: 120,   # refills: 0  Last Office Visit : 6/9/2020  Future Office visit:  11/2/2020  Routing refill request to provider for review/approval because:  Third Request   Abnormal A1C       Refer to clinic for review  Recent Labs   Lab Test 02/04/20  1127   A1C 13.4*     Dahiana Birch RN  Central Triage Red Flags/Med Refills

## 2020-10-30 RX ORDER — LINAGLIPTIN 5 MG/1
5 TABLET, FILM COATED ORAL DAILY
Qty: 30 TABLET | Refills: 0 | Status: SHIPPED | OUTPATIENT
Start: 2020-10-30 | End: 2020-10-30

## 2020-10-30 RX ORDER — METFORMIN HCL 500 MG
2000 TABLET, EXTENDED RELEASE 24 HR ORAL
Qty: 120 TABLET | Refills: 0 | Status: SHIPPED | OUTPATIENT
Start: 2020-10-30 | End: 2020-11-27

## 2020-10-30 NOTE — TELEPHONE ENCOUNTER
[facility] called:  Patient is out of meds and has an appt scheduled on 11/02.  Patient was wondering if can get med refilled.    linagliptin (TRADJENTA) 5 MG TABS tablet and metFORMIN (GLUCOPHAGE-XR) 500 MG 24 hr tablet.       [facility] - [place], [place] - [address] [place] [address] [place]    Will check with RN or provider if can get med refilled.       Frank Trevizo CMA ([facility]) at 11:04 AM on 10/30/2020

## 2020-10-30 NOTE — TELEPHONE ENCOUNTER
Pharmacist, Pradip, called from [facility] discharge:  Reports patient last took Tradjenta 5 mg three days ago for his diabetes.  Patient used to take Junuvia 50 mg tablet po daily.  At some point patient was taking both Tradjenta and Januvia for some time by mistake.  Both med are same family and pt tolerated them well.  Dr. Carrera cancel one the the med as patient was suppose to be taking one of them.  Provider discontinued Januvia.  Patient has been taking Tradjenta.  This med was less preferred by his insurance.  Cost pt $40/month.  Pt was able to use discount for this.  Now benefit ran out for discount.  Pradip looked into Januvia and patient co-maximiliano will be $5/month instead.  Since patient has tolerated Januvia in the past, Pradip was wondering if provider is willing to switch to Januvia and discontinue Tradjenta.      I spoke with Dr. Carrera in clinic today.  Message relayed to provider.  Dr. Carrera gave verbal order to re-prescribe Januvia and discontinue Tradjenta.      Provider gave verbal order    Januvia 50 mg tablet  SIG: Take 1 tablet (50 mg) po daily  Dispense: 90 tablet  Refills: 3    Patient must show up to his 11/02 telephone appointment.     Called patient:  Informed pt Januvia Rx sent today.  Per pharmacist, will cost pt $5/month.  Verify pt he has a telephone appt on Monday, 11/02/20 at 9:30 AM with Dr. Carrera.  Patient is unable to get labs done today due to work.  He will get them done Monday after telephone appointment.  Lab appointment scheduled for tomorrow.      Frank Trevizo CMA ([facility]) at 3:43 PM on 10/30/2020

## 2020-11-02 ENCOUNTER — VIRTUAL VISIT (OUTPATIENT)
Dept: FAMILY MEDICINE | Facility: CLINIC | Age: 50
End: 2020-11-02
Payer: COMMERCIAL

## 2020-11-02 DIAGNOSIS — E11.9 DM TYPE 2, GOAL HBA1C 7%-8% (H): Primary | ICD-10-CM

## 2020-11-02 PROCEDURE — 99213 OFFICE O/P EST LOW 20 MIN: CPT | Mod: TEL | Performed by: FAMILY MEDICINE

## 2020-11-02 NOTE — NURSING NOTE
Chief Complaint   Patient presents with     Medication Request     Pt would like to discuss medications      ARBEN Suazo at 8:27 AM sign on 11/2/2020

## 2020-11-02 NOTE — PROGRESS NOTES
"Video Visit Technology for this patient: No video technology available to patient, please call patient over the phone     Otto Hughes is a 50 year old male who is being evaluated via a billable telephone visit.      The patient has been notified of following:     \"This telephone visit will be conducted via a call between you and your physician/provider. We have found that certain health care needs can be provided without the need for a physical exam.  This service lets us provide the care you need with a short phone conversation.  If a prescription is necessary we can send it directly to your pharmacy.  If lab work is needed we can place an order for that and you can then stop by our lab to have the test done at a later time.    Telephone visits are billed at different rates depending on your insurance coverage. During this emergency period, for some insurers they may be billed the same as an in-person visit.  Please reach out to your insurance provider with any questions.    If during the course of the call the physician/provider feels a telephone visit is not appropriate, you will not be charged for this service.\"    Patient has given verbal consent for Telephone visit?  Yes    What phone number would you like to be contacted at? [phone]    How would you like to obtain your AVS? Mail a copy    Subjective     Otto Hugehs is a 50 year old male who presents via phone visit today for the following health issues:    HPI  Here on phone  Several issues  1-shoulder still hurts, rvwd frozen shoulder more common in DM, can hurt a long time, he'd like me to look at it  2-DM: still probably high, glucometer broken, will send in new rx, also just got januvia yesterday, will do labs later this week, see me in person mid Nov, I told him if end up on insulin ins might pay for cgm, easier    Flu shot at visit if not already done, he might've gotten at work    Past Medical History:   Diagnosis Date     Hyperlipidemia LDL " goal < 100      T2DM (type 2 diabetes mellitus) (H)      Past Surgical History:   Procedure Laterality Date     NO HISTORY OF SURGERY       Current Outpatient Medications   Medication     aspirin (ASPIRIN CHILDRENS) 81 MG chewable tablet     blood glucose (FREESTYLE LITE) test strip     blood glucose (NO BRAND SPECIFIED) test strip     blood glucose monitoring (FREESTYLE FREEDOM LITE) meter device kit     blood glucose monitoring (FREESTYLE) lancets     blood glucose monitoring (SOFTCLIX) lancets     Blood Pressure Monitoring (BLOOD PRESSURE KIT) KIT     clobetasol (TEMOVATE) 0.05 % ointment     clotrimazole-betamethasone (LOTRISONE) cream     diclofenac (VOLTAREN) 75 MG EC tablet     diclofenac (VOLTAREN) 75 MG EC tablet     insulin glargine (LANTUS) 100 UNIT/ML injection     insulin pen needle 31G X 8 MM     lisinopril (PRINIVIL/ZESTRIL) 10 MG tablet     meloxicam (MOBIC) 15 MG tablet     metFORMIN (GLUCOPHAGE-XR) 500 MG 24 hr tablet     order for DME     sildenafil (REVATIO) 20 MG tablet     simvastatin (ZOCOR) 20 MG tablet     sitagliptin (JANUVIA) 50 MG tablet     triamcinolone (KENALOG) 0.1 % ointment     No current facility-administered medications for this visit.      No Known Allergies        Review of Systems          Objective          Vitals:  No vitals were obtained today due to virtual visit.    healthy, alert and no distress  PSYCH: Alert and oriented times 3; coherent speech, normal   rate and volume, able to articulate logical thoughts, able   to abstract reason, no tangential thoughts, no hallucinations   or delusions  His affect is normal  RESP: No cough, no audible wheezing, able to talk in full sentences  Remainder of exam unable to be completed due to telephone visits        Assessment/Plan:  DM:   Labs this week Thurs  See me mid NOv    Shoulder pain: see me then    Update shots, hcm issues then as needed    [address] [place] call    Antelmo Carrera MD

## 2020-11-04 ENCOUNTER — TELEPHONE (OUTPATIENT)
Dept: FAMILY MEDICINE | Facility: CLINIC | Age: 50
End: 2020-11-04

## 2020-11-04 NOTE — TELEPHONE ENCOUNTER
Left message for patient's wife to have patient call back to schedule an in-person visit with Dr Carrera per provider virtual visit on Nov 2, 2020.

## 2020-11-16 DIAGNOSIS — E11.65 TYPE 2 DIABETES MELLITUS WITH HYPERGLYCEMIA, UNSPECIFIED WHETHER LONG TERM INSULIN USE (H): ICD-10-CM

## 2020-11-16 DIAGNOSIS — E78.5 HYPERLIPIDEMIA, UNSPECIFIED HYPERLIPIDEMIA TYPE: ICD-10-CM

## 2020-11-18 RX ORDER — SIMVASTATIN 20 MG
20 TABLET ORAL AT BEDTIME
Qty: 90 TABLET | Refills: 1 | Status: SHIPPED | OUTPATIENT
Start: 2020-11-18 | End: 2021-05-18

## 2020-11-27 DIAGNOSIS — E11.65 TYPE 2 DIABETES MELLITUS WITH HYPERGLYCEMIA, UNSPECIFIED WHETHER LONG TERM INSULIN USE (H): ICD-10-CM

## 2020-11-27 RX ORDER — METFORMIN HCL 500 MG
2000 TABLET, EXTENDED RELEASE 24 HR ORAL
Qty: 360 TABLET | Refills: 3 | Status: SHIPPED | OUTPATIENT
Start: 2020-11-27 | End: 2021-11-30

## 2020-11-27 NOTE — TELEPHONE ENCOUNTER
Health Call Center    Phone Message    May a detailed message be left on voicemail: yes     Reason for Call: Medication Refill Request    Has the patient contacted the pharmacy for the refill? Yes   Name of medication being requested: metFORMIN (GLUCOPHAGE-XR) 500 MG 24 hr tablet  Provider who prescribed the medication: Debi  Pharmacy: [facility] - [place], [place] - [address] [facility] [place]  Date medication is needed: ASAP      Patient states he needs this refilled and that it would not be refilled until he got his labs done. He missed his appointment on 11/2/20 due to work. Patient made appointment for 11/30/20 for labs and is requesting this medication refill ASA. Please follow up with patient if needed. Thank you!     Action Taken: Message routed to:  [facility]    Travel Screening: Not Applicable

## 2020-11-30 DIAGNOSIS — E11.9 DM TYPE 2, GOAL HBA1C 7%-8% (H): ICD-10-CM

## 2020-11-30 LAB
CREAT UR-MCNC: 19 MG/DL
HBA1C MFR BLD: 12.7 % (ref 0–5.6)
MICROALBUMIN UR-MCNC: <5 MG/L
MICROALBUMIN/CREAT UR: NORMAL MG/G CR (ref 0–17)

## 2020-11-30 PROCEDURE — 83036 HEMOGLOBIN GLYCOSYLATED A1C: CPT | Performed by: PATHOLOGY

## 2020-11-30 PROCEDURE — 82043 UR ALBUMIN QUANTITATIVE: CPT | Performed by: PATHOLOGY

## 2020-11-30 PROCEDURE — 36415 COLL VENOUS BLD VENIPUNCTURE: CPT | Performed by: PATHOLOGY

## 2020-12-08 ENCOUNTER — TELEPHONE (OUTPATIENT)
Dept: FAMILY MEDICINE | Facility: CLINIC | Age: 50
End: 2020-12-08

## 2020-12-08 NOTE — TELEPHONE ENCOUNTER
----- Message from Antelmo Carrera MD sent at 12/1/2020  8:25 AM CST -----  Hgba1c quite high. Can you get him appt w/ me soon (in person or virtual) to discuss.

## 2020-12-08 NOTE — TELEPHONE ENCOUNTER
Call and spoke to patient to rely provider message. Patient schedule in-person visit on 12/22 at 8:30 AM.

## 2020-12-08 NOTE — TELEPHONE ENCOUNTER
Attempted to call patient a few times to schedule appointment and no answer.     Provider would like to schedule a follow up with patient, either virtual or in-person to discuss labs. More Alarcon LPN 12/8/2020 10:10 AM

## 2020-12-22 ENCOUNTER — OFFICE VISIT (OUTPATIENT)
Dept: FAMILY MEDICINE | Facility: CLINIC | Age: 50
End: 2020-12-22
Payer: COMMERCIAL

## 2020-12-22 VITALS
HEART RATE: 83 BPM | OXYGEN SATURATION: 100 % | SYSTOLIC BLOOD PRESSURE: 138 MMHG | WEIGHT: 157 LBS | BODY MASS INDEX: 21.9 KG/M2 | DIASTOLIC BLOOD PRESSURE: 80 MMHG

## 2020-12-22 DIAGNOSIS — N52.8 OTHER MALE ERECTILE DYSFUNCTION: ICD-10-CM

## 2020-12-22 DIAGNOSIS — E11.9 TYPE 2 DIABETES MELLITUS WITHOUT COMPLICATION, UNSPECIFIED WHETHER LONG TERM INSULIN USE (H): ICD-10-CM

## 2020-12-22 DIAGNOSIS — Z00.00 HEALTH CARE MAINTENANCE: ICD-10-CM

## 2020-12-22 DIAGNOSIS — E11.9 DM TYPE 2, GOAL HBA1C 7%-8% (H): Primary | ICD-10-CM

## 2020-12-22 PROCEDURE — 90732 PPSV23 VACC 2 YRS+ SUBQ/IM: CPT | Performed by: FAMILY MEDICINE

## 2020-12-22 PROCEDURE — 90686 IIV4 VACC NO PRSV 0.5 ML IM: CPT | Performed by: FAMILY MEDICINE

## 2020-12-22 PROCEDURE — 99214 OFFICE O/P EST MOD 30 MIN: CPT | Mod: 25 | Performed by: FAMILY MEDICINE

## 2020-12-22 PROCEDURE — 90471 IMMUNIZATION ADMIN: CPT | Performed by: FAMILY MEDICINE

## 2020-12-22 PROCEDURE — 90472 IMMUNIZATION ADMIN EACH ADD: CPT | Performed by: FAMILY MEDICINE

## 2020-12-22 RX ORDER — LISINOPRIL 10 MG/1
10 TABLET ORAL DAILY
Qty: 90 TABLET | Refills: 3 | Status: SHIPPED | OUTPATIENT
Start: 2020-12-22 | End: 2021-12-31

## 2020-12-22 RX ORDER — SILDENAFIL 100 MG/1
TABLET, FILM COATED ORAL
Qty: 20 TABLET | Refills: 11 | Status: SHIPPED | OUTPATIENT
Start: 2020-12-22 | End: 2022-01-28

## 2020-12-22 ASSESSMENT — PAIN SCALES - GENERAL: PAINLEVEL: NO PAIN (0)

## 2020-12-22 NOTE — PATIENT INSTRUCTIONS
[facility] Medication Refill Request Information:  * Please contact your pharmacy regarding ANY request for medication refills.  ** [facility] Prescription Fax = [phone]  * Please allow 3 business days for routine medication refills.  * Please allow 5 business days for controlled substance medication refills.     [facility] Test Result notification information:  *You will be notified with in 7-10 days of your appointment day regarding the results of your test.  If you are on MyChart you will be notified as soon as the provider has reviewed the results and signed off on them.    [facility]: [phone]

## 2020-12-22 NOTE — PROGRESS NOTES
SUBJECTIVE:    Pt is a 50 year old male with pmh of     Patient Active Problem List   Diagnosis     Hyperlipidemia     T2DM (type 2 diabetes mellitus) (H)       who is here for evaluation of had concerns including Recheck Medication (pt here to follow up).    Here in f/u  1-HCM: will do colonsocpy some time this winter  2-ED: viagra helps, needs refill  3-DM: due for flu/pvx/eye MD. Ready to consider resume insulin. Meter broken. Discussed cgm. Insulin reviewed; likely at his BG would need that. Does walk often for exercise and at work. Discussed med options at length.    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    Past Medical History:   Diagnosis Date     Hyperlipidemia LDL goal < 100      T2DM (type 2 diabetes mellitus) (H)      Past Surgical History:   Procedure Laterality Date     NO HISTORY OF SURGERY       Current Outpatient Medications   Medication     aspirin (ASPIRIN CHILDRENS) 81 MG chewable tablet     blood glucose (FREESTYLE LITE) test strip     blood glucose (NO BRAND SPECIFIED) test strip     blood glucose monitoring (FREESTYLE FREEDOM LITE) meter device kit     blood glucose monitoring (FREESTYLE) lancets     blood glucose monitoring (NO BRAND SPECIFIED) meter device kit     blood glucose monitoring (SOFTCLIX) lancets     Blood Pressure Monitoring (BLOOD PRESSURE KIT) KIT     clobetasol (TEMOVATE) 0.05 % ointment     clotrimazole-betamethasone (LOTRISONE) cream     diclofenac (VOLTAREN) 75 MG EC tablet     diclofenac (VOLTAREN) 75 MG EC tablet     insulin glargine (LANTUS) 100 UNIT/ML injection     insulin pen needle 31G X 8 MM     lisinopril (ZESTRIL) 10 MG tablet     meloxicam (MOBIC) 15 MG tablet     metFORMIN (GLUCOPHAGE-XR) 500 MG 24 hr tablet     order for DME     sildenafil (REVATIO) 20 MG tablet     sildenafil (VIAGRA) 100 MG tablet     simvastatin (ZOCOR) 20 MG tablet     sitagliptin (JANUVIA) 50 MG tablet     triamcinolone (KENALOG) 0.1 % ointment     No current facility-administered  medications for this visit.      No Known Allergies            Current Outpatient Medications   Medication Sig Dispense Refill     aspirin (ASPIRIN CHILDRENS) 81 MG chewable tablet Take 81 mg by mouth daily.       blood glucose (FREESTYLE LITE) test strip Use to test blood sugars 3 times daily or as directed. 100 strip 11     blood glucose (NO BRAND SPECIFIED) test strip Use to test blood sugars 3 times daily or as directed.For  refills, please schedule a follow-up appt 300 strip 0     blood glucose monitoring (FREESTYLE FREEDOM LITE) meter device kit Use to test blood sugars daily as directed. 1 kit 0     blood glucose monitoring (FREESTYLE) lancets Use to test blood sugar 3 times daily or as directed 300 each 3     blood glucose monitoring (NO BRAND SPECIFIED) meter device kit Use to test blood sugar 1 times daily or as directed. 1 kit 0     blood glucose monitoring (SOFTCLIX) lancets Use to test blood sugar 3 times daily or as directed. For  refills, please schedule a follow-up appt 300 each 0     Blood Pressure Monitoring (BLOOD PRESSURE KIT) KIT Take blood pressure daily and keep list for MD visits 1 kit 0     clobetasol (TEMOVATE) 0.05 % ointment Apply topically 2 times daily Apply to nodules that have been picked at and cover with bandaid. Avoid face, underarms, and groin folds. 60 g 3     clotrimazole-betamethasone (LOTRISONE) cream Apply topically 2 times daily Apply to rash behind knees 45 g 1     diclofenac (VOLTAREN) 75 MG EC tablet Take 1 tablet (75 mg) by mouth 2 times daily 20 tablet 1     diclofenac (VOLTAREN) 75 MG EC tablet Take 1 tablet (75 mg) by mouth 2 times daily 20 tablet 0     insulin glargine (LANTUS) 100 UNIT/ML injection Inject 10 Units Subcutaneous At Bedtime Patient needs to see primary provider and have labs for further refills. 3 mL 0     insulin pen needle 31G X 8 MM Use daily to inject Lantus. Please make ANNUAL exam with PRIMARY provider for continued refills. 30 each 5      lisinopril (ZESTRIL) 10 MG tablet Take 1 tablet (10 mg) by mouth daily 90 tablet 3     meloxicam (MOBIC) 15 MG tablet Take 1 tablet (15 mg) by mouth daily 20 tablet 0     metFORMIN (GLUCOPHAGE-XR) 500 MG 24 hr tablet Take 4 tablets (2,000 mg) by mouth daily (with dinner) Take 4 tablets (2,000 mg) by mouth daily (with dinner). 360 tablet 3     order for DME Equipment being ordered: Blood pressure cuff and monitor 1 Device 0     sildenafil (REVATIO) 20 MG tablet Take 1-2 tab daily as needed. Take 30 min to 4 hours before intercourse.  Never use with nitroglycerin, terazosin or doxazosin. 12 tablet 3     sildenafil (VIAGRA) 100 MG tablet Take 1/2 to 1 full tab every day prn 20 tablet 11     simvastatin (ZOCOR) 20 MG tablet Take 1 tablet (20 mg) by mouth At Bedtime 90 tablet 1     sitagliptin (JANUVIA) 50 MG tablet Take 1 tablet (50 mg) by mouth daily 90 tablet 3     triamcinolone (KENALOG) 0.1 % ointment Apply topically 2 times daily To sites of dry, red, itchy skin. 454 g 1       Social History     Tobacco Use     Smoking status: Former Smoker     Packs/day: 0.50     Years: 17.00     Pack years: 8.50     Quit date: 2008     Years since quittin.9     Smokeless tobacco: Never Used   Substance Use Topics     Alcohol use: No     Drug use: No           OBJECTIVE:  /80 (BP Location: Right arm, Patient Position: Sitting, Cuff Size: Adult Regular)   Pulse 83   Wt 71.2 kg (157 lb)   SpO2 100%   BMI 21.90 kg/m    GENERAL APPEARANCE: Alert, no acute distress  NEURO: Alert, oriented, speech and mentation normal  PSYCHE: mentation appears normal, affect and mood normal    ASSESSMENT/PLAN:    DM:  See eye MD  Get flu, pvx  Do covid shot when can  See MTM: insulin, cgm  Cont statin, ace, resume baby asa every day    HCM: colonoscopy    ED: viagra refill    1/2 hr visit over half couns/coord care majority re: dm eval/treat      MEAGHAN RUELAS MD

## 2020-12-22 NOTE — NURSING NOTE
Chief Complaint   Patient presents with     Recheck Medication     pt here to follow up       Logan Hall CMA, EMT at 8:42 AM on 12/22/2020.

## 2020-12-28 ENCOUNTER — TELEPHONE (OUTPATIENT)
Dept: GASTROENTEROLOGY | Facility: CLINIC | Age: 50
End: 2020-12-28

## 2020-12-28 NOTE — TELEPHONE ENCOUNTER
2nd Attempt    Left message for patient to call back to schedule colonoscopy.     Procedure: Lower Endoscopy    Lower Endoscopy Type: Colonoscopy    Purpose of Colonoscopy Procedure: Screening    Colonoscopy Sedation: Conscious/Moderate    Preferred Location: [facility]    Scheduling Instructions: If you have not heard from the scheduling office within 2 business days, please call [phone].

## 2020-12-29 ENCOUNTER — VIRTUAL VISIT (OUTPATIENT)
Dept: PHARMACY | Facility: CLINIC | Age: 50
End: 2020-12-29
Attending: FAMILY MEDICINE
Payer: COMMERCIAL

## 2020-12-29 DIAGNOSIS — E11.9 TYPE 2 DIABETES MELLITUS WITHOUT COMPLICATION, UNSPECIFIED WHETHER LONG TERM INSULIN USE (H): Primary | ICD-10-CM

## 2020-12-29 DIAGNOSIS — I11.9 HYPERTENSIVE HEART DISEASE WITHOUT HEART FAILURE: ICD-10-CM

## 2020-12-29 DIAGNOSIS — E78.5 HYPERLIPIDEMIA, UNSPECIFIED HYPERLIPIDEMIA TYPE: ICD-10-CM

## 2020-12-29 PROCEDURE — 99607 MTMS BY PHARM ADDL 15 MIN: CPT | Mod: TEL | Performed by: PHARMACIST

## 2020-12-29 PROCEDURE — 99605 MTMS BY PHARM NP 15 MIN: CPT | Mod: TEL | Performed by: PHARMACIST

## 2020-12-29 RX ORDER — PROCHLORPERAZINE 25 MG/1
1 SUPPOSITORY RECTAL ONCE
Qty: 1 DEVICE | Refills: 0 | Status: SHIPPED | OUTPATIENT
Start: 2020-12-29 | End: 2020-12-29

## 2020-12-29 RX ORDER — PROCHLORPERAZINE 25 MG/1
1 SUPPOSITORY RECTAL
Qty: 1 EACH | Refills: 3 | Status: SHIPPED | OUTPATIENT
Start: 2020-12-29 | End: 2021-02-02

## 2020-12-29 RX ORDER — PROCHLORPERAZINE 25 MG/1
1 SUPPOSITORY RECTAL
Qty: 3 EACH | Refills: 11 | Status: SHIPPED | OUTPATIENT
Start: 2020-12-29 | End: 2021-02-02

## 2020-12-29 NOTE — PROGRESS NOTES
MTM ENCOUNTER  SUBJECTIVE/OBJECTIVE:                           Otto Hughes is a 50 year old male called for an initial visit. He was referred to me from Edilberto Carrera.    Reason for visit: uncontrolled diabetes.    Allergies/ADRs: Reviewed in chart  Tobacco: He reports that he quit smoking about 13 years ago. He has a 8.50 pack-year smoking history. He has never used smokeless tobacco.  Alcohol: not currently using  Caffeine: 3-4 cups/day of coffee  Activity: he walks for about an hour daily, per his report  Past Medical History: Reviewed in chart      Medication Adherence/Access: no issues reported; He uses discharge pharmacy at [facility].    Type 2 Diabetes:  Currently taking metformin XR 2 g daily, Janurvia 50 mg daily, Lantus 10 units daily (not currently taking). Patient is not experiencing side effects. He has lost a lot of weight recently (25 lbs, lost over 1 year). He was not trying to lose weight. He eats a lot and he still cannot gain weight. He wonders why this and how he can gain weight back.   Blood sugar monitoring: He doesn't have a meter right now, insurance doesn't cover according to the patient. He doesn't want to wear a sensor in the arm because he doesn't want his coworkers to know he has diabetes.  Ranges: NA  Symptoms of low blood sugar? none  Symptoms of high blood sugar? polyphagia and weight loss  Eye exam: due  Foot exam: due  Diet/Exercise: no specific diet for diabetes, does endorse low intensity activity for about an hour daily.   Aspirin: Taking 81mg daily and denies side effects  Statin: Yes: simvastatin 20 mg daily     Recent Labs   Lab Test 02/04/20  1127 09/17/18  1235 09/15/14  0926 09/15/14  0926 10/14/13  0957   CHOL 157 127   < > 120 134   HDL 44 40   < > 46 36*   LDL 77 61   < > 56 60   TRIG 180* 128   < > 89 188*   CHOLHDLRATIO  --   --   --  2.6 3.7    < > = values in this interval not displayed.     The 10-year ASCVD risk score (Telma GUZMAN Jr., et al., 2013) is: 8.8%    Values used  to calculate the score:      Age: 50 years      Sex: Male      Is Non- : No      Diabetic: Yes      Tobacco smoker: No      Systolic Blood Pressure: 138 mmHg      Is BP treated: Yes      HDL Cholesterol: 44 mg/dL      Total Cholesterol: 157 mg/dL    ACEi/ARB: Yes: lisinopril 10 mg daily.    BP Readings from Last 3 Encounters:   12/22/20 138/80   02/04/20 133/80   09/23/19 133/81     Urine Albumin:   Lab Results   Component Value Date    UMALCR Unable to calculate due to low value 11/30/2020      Lab Results   Component Value Date    A1C 12.7 11/30/2020    A1C 13.4 02/04/2020    A1C 11.0 09/17/2018    A1C 11.2 11/14/2017    A1C 10.2 01/31/2017     Hypertension: Current medications include lisinopril 10 mg daily.  Patient does not self-monitor blood pressure.  Patient reports no current medication side effects.  BP Readings from Last 3 Encounters:   12/22/20 138/80   02/04/20 133/80   09/23/19 133/81     Hyperlipidemia: Current therapy includes simvastatin 20 mg daily.  Patient reports no significant myalgias or other side effects.  The 10-year ASCVD risk score ([place]annon GUZMAN Jr., et al., 2013) is: 8.8%    Values used to calculate the score:      Age: 50 years      Sex: Male      Is Non- : No      Diabetic: Yes      Tobacco smoker: No      Systolic Blood Pressure: 138 mmHg      Is BP treated: Yes      HDL Cholesterol: 44 mg/dL      Total Cholesterol: 157 mg/dL  Recent Labs   Lab Test 02/04/20  1127 09/17/18  1235 09/15/14  0926 09/15/14  0926 10/14/13  0957   CHOL 157 127   < > 120 134   HDL 44 40   < > 46 36*   LDL 77 61   < > 56 60   TRIG 180* 128   < > 89 188*   CHOLHDLRATIO  --   --   --  2.6 3.7    < > = values in this interval not displayed.       Today's Vitals: There were no vitals taken for this visit. - telemed  BP Readings from Last 1 Encounters:   12/22/20 138/80     Pulse Readings from Last 1 Encounters:   12/22/20 83     Wt Readings from Last 1 Encounters:  "  12/22/20 157 lb (71.2 kg)     Ht Readings from Last 1 Encounters:   07/14/20 5' 11\" (1.803 m)     Estimated body mass index is 21.9 kg/m  as calculated from the following:    Height as of 7/14/20: 5' 11\" (1.803 m).    Weight as of 12/22/20: 157 lb (71.2 kg).    Temp Readings from Last 1 Encounters:   02/04/20 97.8  F (36.6  C) (Oral)       ASSESSMENT:                              Medication Adherence: No issues identified    Type 2 Diabetes: Patient is not meeting A1c goal of < 7%. Self monitoring of blood glucose is not at goal of fasting  mg/dL and post prandial < 150 mg/dL. Patient would benefit from starting insulin and possibly a GLP1 RA in the future. First, will start him on CGM to see if he can improve diet choices based on his CGM data.   Hypertension: Stable.  Hyperlipidemia: Stable.    PLAN:                            1. Start Lantus 10 units daily.   2. Start DexCom G6 if covered.     I spent 27 minutes with this patient today. All changes were made via collaborative practice agreement with Edilberto Carrera. A copy of the visit note was provided to the patient's primary care provider.    Will follow up when I have heard back from the pharmacy regarding DexCom coverage.    The patient declined a summary of these recommendations.     Josephine Mendez, Pharm.D., [facility]  Medication Therapy Management Pharmacist  Page/VM:  [phone]      Patient consented to a telehealth visit: yes  Telemedicine Visit Details  Type of service:  Telephone visit  Start Time: 4:30 PM  End Time: 4:57 PM  Originating Location (patient location): Home  Distant Location (provider location):  [facility] MEDICATION THERAPY MANAGEMENT  Mode of Communication:  Telephone        Medication Therapy Recommendations  T2DM (type 2 diabetes mellitus) (H)    Current Medication: metFORMIN (GLUCOPHAGE-XR) 500 MG 24 hr tablet   Rationale: Synergistic therapy - Needs additional medication therapy - Indication   Recommendation: Start " Medication - LANTUS SOLOSTAR SC - Start 10 units daily.   Status: Accepted per CPA          Current Medication: metFORMIN (GLUCOPHAGE-XR) 500 MG 24 hr tablet   Rationale: Medication requires monitoring - Needs additional monitoring - Effectiveness   Recommendation: Self-Monitoring - Dexcom G5  Kit Paola - Start monitoring BG with dexcom before eating and 2 hours after eating   Status: Accepted per CPA

## 2020-12-30 ENCOUNTER — APPOINTMENT (OUTPATIENT)
Dept: CT IMAGING | Facility: CLINIC | Age: 50
End: 2020-12-30
Attending: EMERGENCY MEDICINE
Payer: COMMERCIAL

## 2020-12-30 ENCOUNTER — HOSPITAL ENCOUNTER (EMERGENCY)
Facility: CLINIC | Age: 50
Discharge: HOME | End: 2020-12-30
Attending: EMERGENCY MEDICINE | Admitting: EMERGENCY MEDICINE
Payer: COMMERCIAL

## 2020-12-30 ENCOUNTER — APPOINTMENT (OUTPATIENT)
Dept: GENERAL RADIOLOGY | Facility: CLINIC | Age: 50
End: 2020-12-30
Attending: EMERGENCY MEDICINE
Payer: COMMERCIAL

## 2020-12-30 VITALS
SYSTOLIC BLOOD PRESSURE: 120 MMHG | DIASTOLIC BLOOD PRESSURE: 67 MMHG | RESPIRATION RATE: 11 BRPM | HEART RATE: 111 BPM | OXYGEN SATURATION: 98 %

## 2020-12-30 DIAGNOSIS — S02.2XXA CLOSED FRACTURE OF NASAL BONE, INITIAL ENCOUNTER: ICD-10-CM

## 2020-12-30 DIAGNOSIS — Z23 NEED FOR PROPHYLACTIC VACCINATION AND INOCULATION AGAINST CHOLERA ALONE: ICD-10-CM
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
## 2020-12-30 DIAGNOSIS — K08.89 LOOSE TOOTH DUE TO TRAUMA: ICD-10-CM

## 2020-12-30 LAB
ALBUMIN SERPL-MCNC: 3.8 G/DL (ref 3.4–5)
ALP SERPL-CCNC: 92 U/L (ref 40–150)
ALT SERPL W P-5'-P-CCNC: 17 U/L (ref 0–70)
ANION GAP SERPL CALCULATED.3IONS-SCNC: 11 MMOL/L (ref 3–14)
AST SERPL W P-5'-P-CCNC: 14 U/L (ref 0–45)
BASOPHILS # BLD AUTO: 0.1 10E9/L (ref 0–0.2)
BASOPHILS NFR BLD AUTO: 0.7 %
BILIRUB SERPL-MCNC: 0.4 MG/DL (ref 0.2–1.3)
BUN SERPL-MCNC: 14 MG/DL (ref 7–30)
CALCIUM SERPL-MCNC: 9.5 MG/DL (ref 8.5–10.1)
CHLORIDE SERPL-SCNC: 105 MMOL/L (ref 94–109)
CO2 SERPL-SCNC: 23 MMOL/L (ref 20–32)
CREAT SERPL-MCNC: 0.62 MG/DL (ref 0.66–1.25)
DIFFERENTIAL METHOD BLD: NORMAL
EOSINOPHIL # BLD AUTO: 0.1 10E9/L (ref 0–0.7)
EOSINOPHIL NFR BLD AUTO: 0.8 %
ERYTHROCYTE [DISTWIDTH] IN BLOOD BY AUTOMATED COUNT: 11.7 % (ref 10–15)
GFR SERPL CREATININE-BSD FRML MDRD: >90 ML/MIN/{1.73_M2}
GLUCOSE SERPL-MCNC: 474 MG/DL (ref 70–99)
HCT VFR BLD AUTO: 48.8 % (ref 40–53)
HGB BLD-MCNC: 15.4 G/DL (ref 13.3–17.7)
HGB BLD-MCNC: 16.3 G/DL (ref 13.3–17.7)
IMM GRANULOCYTES # BLD: 0 10E9/L (ref 0–0.4)
IMM GRANULOCYTES NFR BLD: 0.3 %
INTERPRETATION ECG - MUSE: NORMAL
LYMPHOCYTES # BLD AUTO: 2.1 10E9/L (ref 0.8–5.3)
LYMPHOCYTES NFR BLD AUTO: 27.1 %
MCH RBC QN AUTO: 30.4 PG (ref 26.5–33)
MCHC RBC AUTO-ENTMCNC: 33.4 G/DL (ref 31.5–36.5)
MCV RBC AUTO: 91 FL (ref 78–100)
MONOCYTES # BLD AUTO: 0.8 10E9/L (ref 0–1.3)
MONOCYTES NFR BLD AUTO: 10 %
NEUTROPHILS # BLD AUTO: 4.7 10E9/L (ref 1.6–8.3)
NEUTROPHILS NFR BLD AUTO: 61.1 %
NRBC # BLD AUTO: 0 10*3/UL
NRBC BLD AUTO-RTO: 0 /100
PLATELET # BLD AUTO: 290 10E9/L (ref 150–450)
POTASSIUM SERPL-SCNC: 3.6 MMOL/L (ref 3.4–5.3)
PROT SERPL-MCNC: 8 G/DL (ref 6.8–8.8)
RBC # BLD AUTO: 5.37 10E12/L (ref 4.4–5.9)
SODIUM SERPL-SCNC: 139 MMOL/L (ref 133–144)
TROPONIN I SERPL-MCNC: <0.015 UG/L (ref 0–0.04)
WBC # BLD AUTO: 7.7 10E9/L (ref 4–11)

## 2020-12-30 PROCEDURE — 76604 US EXAM CHEST: CPT | Mod: 26 | Performed by: EMERGENCY MEDICINE

## 2020-12-30 PROCEDURE — 96375 TX/PRO/DX INJ NEW DRUG ADDON: CPT

## 2020-12-30 PROCEDURE — 250N000011 HC RX IP 250 OP 636: Performed by: EMERGENCY MEDICINE

## 2020-12-30 PROCEDURE — 85025 COMPLETE CBC W/AUTO DIFF WBC: CPT | Performed by: EMERGENCY MEDICINE

## 2020-12-30 PROCEDURE — 70450 CT HEAD/BRAIN W/O DYE: CPT

## 2020-12-30 PROCEDURE — 71045 X-RAY EXAM CHEST 1 VIEW: CPT | Mod: 26 | Performed by: RADIOLOGY

## 2020-12-30 PROCEDURE — 76705 ECHO EXAM OF ABDOMEN: CPT | Mod: 26 | Performed by: EMERGENCY MEDICINE

## 2020-12-30 PROCEDURE — 76705 ECHO EXAM OF ABDOMEN: CPT

## 2020-12-30 PROCEDURE — 93308 TTE F-UP OR LMTD: CPT

## 2020-12-30 PROCEDURE — 96361 HYDRATE IV INFUSION ADD-ON: CPT

## 2020-12-30 PROCEDURE — 71045 X-RAY EXAM CHEST 1 VIEW: CPT

## 2020-12-30 PROCEDURE — 72125 CT NECK SPINE W/O DYE: CPT | Mod: 26 | Performed by: RADIOLOGY

## 2020-12-30 PROCEDURE — 90471 IMMUNIZATION ADMIN: CPT | Performed by: EMERGENCY MEDICINE

## 2020-12-30 PROCEDURE — 70450 CT HEAD/BRAIN W/O DYE: CPT | Mod: 26 | Performed by: RADIOLOGY

## 2020-12-30 PROCEDURE — 70486 CT MAXILLOFACIAL W/O DYE: CPT | Mod: 26 | Performed by: RADIOLOGY

## 2020-12-30 PROCEDURE — 250N000013 HC RX MED GY IP 250 OP 250 PS 637: Performed by: EMERGENCY MEDICINE

## 2020-12-30 PROCEDURE — 96374 THER/PROPH/DIAG INJ IV PUSH: CPT | Mod: 59

## 2020-12-30 PROCEDURE — 76604 US EXAM CHEST: CPT

## 2020-12-30 PROCEDURE — 74177 CT ABD & PELVIS W/CONTRAST: CPT

## 2020-12-30 PROCEDURE — 80053 COMPREHEN METABOLIC PANEL: CPT | Performed by: EMERGENCY MEDICINE

## 2020-12-30 PROCEDURE — 93005 ELECTROCARDIOGRAM TRACING: CPT

## 2020-12-30 PROCEDURE — 96376 TX/PRO/DX INJ SAME DRUG ADON: CPT

## 2020-12-30 PROCEDURE — 72125 CT NECK SPINE W/O DYE: CPT

## 2020-12-30 PROCEDURE — 90714 TD VACC NO PRESV 7 YRS+ IM: CPT | Performed by: EMERGENCY MEDICINE

## 2020-12-30 PROCEDURE — 70486 CT MAXILLOFACIAL W/O DYE: CPT

## 2020-12-30 PROCEDURE — 84484 ASSAY OF TROPONIN QUANT: CPT | Performed by: EMERGENCY MEDICINE

## 2020-12-30 PROCEDURE — 85018 HEMOGLOBIN: CPT | Performed by: EMERGENCY MEDICINE

## 2020-12-30 PROCEDURE — 71250 CT THORAX DX C-: CPT | Mod: 26 | Performed by: RADIOLOGY

## 2020-12-30 PROCEDURE — 258N000003 HC RX IP 258 OP 636: Performed by: EMERGENCY MEDICINE

## 2020-12-30 PROCEDURE — 99285 EMERGENCY DEPT VISIT HI MDM: CPT | Mod: 25

## 2020-12-30 PROCEDURE — 74176 CT ABD & PELVIS W/O CONTRAST: CPT | Mod: 26 | Performed by: RADIOLOGY

## 2020-12-30 PROCEDURE — 93010 ELECTROCARDIOGRAM REPORT: CPT | Mod: 59 | Performed by: EMERGENCY MEDICINE

## 2020-12-30 PROCEDURE — 99285 EMERGENCY DEPT VISIT HI MDM: CPT | Performed by: EMERGENCY MEDICINE

## 2020-12-30 PROCEDURE — 93308 TTE F-UP OR LMTD: CPT | Mod: 26 | Performed by: EMERGENCY MEDICINE

## 2020-12-30 RX ORDER — OXYCODONE HYDROCHLORIDE 5 MG/1
5 TABLET ORAL EVERY 6 HOURS PRN
Qty: 10 TABLET | Refills: 0 | Status: SHIPPED | OUTPATIENT
Start: 2020-12-30 | End: 2021-02-02

## 2020-12-30 RX ORDER — CHLORHEXIDINE GLUCONATE ORAL RINSE 1.2 MG/ML
15 SOLUTION DENTAL 2 TIMES DAILY
Qty: 473 ML | Refills: 0 | Status: SHIPPED | OUTPATIENT
Start: 2020-12-30

## 2020-12-30 RX ORDER — MORPHINE SULFATE 4 MG/ML
4 INJECTION, SOLUTION INTRAMUSCULAR; INTRAVENOUS ONCE
Status: COMPLETED | OUTPATIENT
Start: 2020-12-30 | End: 2020-12-30

## 2020-12-30 RX ORDER — OXYCODONE HYDROCHLORIDE 5 MG/1
5 TABLET ORAL ONCE
Status: COMPLETED | OUTPATIENT
Start: 2020-12-30 | End: 2020-12-30

## 2020-12-30 RX ORDER — SODIUM CHLORIDE 9 MG/ML
INJECTION, SOLUTION INTRAVENOUS CONTINUOUS
Status: DISCONTINUED | OUTPATIENT
Start: 2020-12-30 | End: 2020-12-30 | Stop reason: HOSPADM

## 2020-12-30 RX ORDER — ONDANSETRON 2 MG/ML
4 INJECTION INTRAMUSCULAR; INTRAVENOUS EVERY 30 MIN PRN
Status: DISCONTINUED | OUTPATIENT
Start: 2020-12-30 | End: 2020-12-30 | Stop reason: HOSPADM

## 2020-12-30 RX ORDER — MORPHINE SULFATE 4 MG/ML
4 INJECTION, SOLUTION INTRAMUSCULAR; INTRAVENOUS
Status: COMPLETED | OUTPATIENT
Start: 2020-12-30 | End: 2020-12-30

## 2020-12-30 RX ORDER — IOPAMIDOL 755 MG/ML
100 INJECTION, SOLUTION INTRAVASCULAR ONCE
Status: COMPLETED | OUTPATIENT
Start: 2020-12-30 | End: 2020-12-30

## 2020-12-30 RX ADMIN — CLOSTRIDIUM TETANI TOXOID ANTIGEN (FORMALDEHYDE INACTIVATED) AND CORYNEBACTERIUM DIPHTHERIAE TOXOID ANTIGEN (FORMALDEHYDE INACTIVATED) 0.5 ML: 5; 2 INJECTION, SUSPENSION INTRAMUSCULAR at 08:35

## 2020-12-30 RX ADMIN — IOPAMIDOL 100 ML: 755 INJECTION, SOLUTION INTRAVENOUS at 13:28

## 2020-12-30 RX ADMIN — OXYCODONE HYDROCHLORIDE 5 MG: 5 TABLET ORAL at 13:19

## 2020-12-30 RX ADMIN — ONDANSETRON 4 MG: 2 INJECTION INTRAMUSCULAR; INTRAVENOUS at 08:34

## 2020-12-30 RX ADMIN — SODIUM CHLORIDE 1000 ML: 900 INJECTION, SOLUTION INTRAVENOUS at 12:12

## 2020-12-30 RX ADMIN — SODIUM CHLORIDE 1000 ML: 9 INJECTION, SOLUTION INTRAVENOUS at 10:37

## 2020-12-30 RX ADMIN — MORPHINE SULFATE 4 MG: 4 INJECTION INTRAVENOUS at 14:03

## 2020-12-30 RX ADMIN — MORPHINE SULFATE 4 MG: 4 INJECTION INTRAVENOUS at 08:34
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
## 2020-12-30 NOTE — PROGRESS NOTES
Called  discharge pharmacy to check on coverage of DexCom G6. The pharmacist states they cannot bill for the CGM at his pharmacy and it will need to be processed at  [facility] ([phone], Clementine Veronica). Chasity will cost around $75 for 3 months. The pharmacist speculates this is because he is being charged 3 copays due to his low starting dose. Will touchbase with mail order to see if DexCom is covered.

## 2020-12-30 NOTE — LETTER
December 30, 2020      To Whom It May Concern:      Otto Hughes was seen in our Emergency Department today, 12/30/20.  I expect his condition to improve over the next 1-2 days.  He may return to work/school when improved.    Sincerely,        Brent Finney MD
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
## 2020-12-30 NOTE — ED AVS SNAPSHOT
[facility] Emergency Department  [address] [address]  Phone: [phone]                                    Otto Hughes   MRN: [id number]    Department: [facility] Emergency Department   Date of Visit: 12/30/2020           After Visit Summary Signature Page    I have received my discharge instructions, and my questions have been answered. I have discussed any challenges I see with this plan with the nurse or doctor.    ..........................................................................................................................................  Patient/Patient Representative Signature      ..........................................................................................................................................  Patient Representative Print Name and Relationship to Patient    ..................................................               ................................................  Date                                   Time    ..........................................................................................................................................  Reviewed by Signature/Title    ...................................................              ..............................................  Date                                               Time          22EPIC Rev 08/18
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
## 2020-12-31 ENCOUNTER — TELEPHONE (OUTPATIENT)
Dept: FAMILY MEDICINE | Facility: CLINIC | Age: 50
End: 2020-12-31
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
## 2021-01-05 ENCOUNTER — TELEPHONE (OUTPATIENT)
Dept: FAMILY MEDICINE | Facility: CLINIC | Age: 51
End: 2021-01-05

## 2021-01-05 ENCOUNTER — OFFICE VISIT (OUTPATIENT)
Dept: FAMILY MEDICINE | Facility: CLINIC | Age: 51
End: 2021-01-05
Payer: COMMERCIAL

## 2021-01-05 VITALS — DIASTOLIC BLOOD PRESSURE: 84 MMHG | OXYGEN SATURATION: 99 % | HEART RATE: 90 BPM | SYSTOLIC BLOOD PRESSURE: 143 MMHG
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
## 2021-01-05 DIAGNOSIS — R93.89 ABNORMAL CHEST CT: ICD-10-CM

## 2021-01-05 DIAGNOSIS — I77.819 ECTATIC AORTA (H): ICD-10-CM

## 2021-01-05 DIAGNOSIS — K08.89 TOOTH LOOSE: ICD-10-CM

## 2021-01-05 DIAGNOSIS — S06.0X0D CONCUSSION WITHOUT LOSS OF CONSCIOUSNESS, SUBSEQUENT ENCOUNTER: ICD-10-CM

## 2021-01-05 DIAGNOSIS — S02.2XXD CLOSED FRACTURE OF NASAL BONE WITH ROUTINE HEALING, SUBSEQUENT ENCOUNTER: ICD-10-CM

## 2021-01-05 DIAGNOSIS — M25.511 ACUTE PAIN OF RIGHT SHOULDER: ICD-10-CM

## 2021-01-05 PROCEDURE — 99213 OFFICE O/P EST LOW 20 MIN: CPT | Performed by: FAMILY MEDICINE
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
## 2021-01-05 NOTE — TELEPHONE ENCOUNTER
[facility] Call Center    Phone Message    May a detailed message be left on voicemail: yes     Reason for Call: Other: Dr. Carrera wrote patient a referral to the dental clinic today but the location that patient called can't see him. Patient does not understand why he can't be seen. Patient called [phone]. Patient would like a call back from Dr. Carrera's clinic to update him. Thank you.      Action Taken: Message routed to:  [facility]    Travel Screening: Not Applicable
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
## 2021-01-06 NOTE — TELEPHONE ENCOUNTER
Spoke to dental clinic who states that they are not accepting new patients unless referral states need for Urgent need for dental care.     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 [facility] number:[phone].    Also relayed message regarding meter and that :[facility] mail order was supposed to call him (they told provider they were going to). He should call them back at [phone].     Gave patient number for [facility] and [facility] order pharmacy. More Alarcon LPN 1/6/2021 10:34 AM
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
## 2021-01-08 ENCOUNTER — ANCILLARY PROCEDURE (OUTPATIENT)
Dept: GENERAL RADIOLOGY | Facility: CLINIC | Age: 51
End: 2021-01-08
Attending: FAMILY MEDICINE
Payer: COMMERCIAL

## 2021-01-08 ENCOUNTER — OFFICE VISIT (OUTPATIENT)
Dept: ORTHOPEDICS | Facility: CLINIC | Age: 51
End: 2021-01-08
Payer: COMMERCIAL

## 2021-01-08 ENCOUNTER — OFFICE VISIT (OUTPATIENT)
Dept: OTOLARYNGOLOGY | Facility: CLINIC | Age: 51
End: 2021-01-08
Payer: COMMERCIAL

## 2021-01-08 ENCOUNTER — PRE VISIT (OUTPATIENT)
Dept: OTOLARYNGOLOGY | Facility: CLINIC | Age: 51
End: 2021-01-08

## 2021-01-08 VITALS
DIASTOLIC BLOOD PRESSURE: 76 MMHG | TEMPERATURE: 97.5 F | SYSTOLIC BLOOD PRESSURE: 125 MMHG | OXYGEN SATURATION: 100 % | BODY MASS INDEX: 20.88 KG/M2 | WEIGHT: 149.7 LBS

## 2021-01-08 VITALS — HEIGHT: 71 IN | BODY MASS INDEX: 22.4 KG/M2 | WEIGHT: 160 LBS

## 2021-01-08 DIAGNOSIS — M75.01 ADHESIVE CAPSULITIS OF RIGHT SHOULDER: Primary | ICD-10-CM

## 2021-01-08 DIAGNOSIS — S02.2XXA CLOSED FRACTURE OF NASAL BONE, INITIAL ENCOUNTER: Primary | ICD-10-CM

## 2021-01-08 DIAGNOSIS — M25.511 ACUTE PAIN OF RIGHT SHOULDER: ICD-10-CM
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
## 2021-01-08 PROCEDURE — 73030 X-RAY EXAM OF SHOULDER: CPT | Mod: RT | Performed by: RADIOLOGY

## 2021-01-08 PROCEDURE — 99202 OFFICE O/P NEW SF 15 MIN: CPT | Performed by: OTOLARYNGOLOGY

## 2021-01-08 PROCEDURE — 99214 OFFICE O/P EST MOD 30 MIN: CPT | Performed by: FAMILY MEDICINE

## 2021-01-08 RX ORDER — MELOXICAM 15 MG/1
15 TABLET ORAL DAILY
Qty: 14 TABLET | Refills: 0 | Status: SHIPPED | OUTPATIENT
Start: 2021-01-08 | End: 2021-01-08

## 2021-01-08 RX ORDER — MELOXICAM 15 MG/1
15 TABLET ORAL DAILY
Qty: 14 TABLET | Refills: 0 | Status: SHIPPED | OUTPATIENT
Start: 2021-01-08 | End: 2022-02-21

## 2021-01-08 ASSESSMENT — MIFFLIN-ST. JEOR: SCORE: 1597.89

## 2021-01-08 ASSESSMENT — PAIN SCALES - GENERAL: PAINLEVEL: MODERATE PAIN (5)
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
## 2021-01-08 NOTE — PATIENT INSTRUCTIONS
Take meloxicam every morning with food for 14 days  Do not take ibuprofen while you are taking meloxicam  Ok to use tylenol with meloxicam  Follow up with physical therapy  To schedule a Physical Therapy appointment with the [facility], please call [phone].   Consider steroid injection if hemoglobin A1c improves.
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
## 2021-01-12 ENCOUNTER — OFFICE VISIT (OUTPATIENT)
Dept: FAMILY MEDICINE | Facility: CLINIC | Age: 51
End: 2021-01-12
Payer: COMMERCIAL

## 2021-01-12 ENCOUNTER — TELEPHONE (OUTPATIENT)
Dept: PHARMACY | Facility: CLINIC | Age: 51
End: 2021-01-12

## 2021-01-12 VITALS
WEIGHT: 150 LBS | HEART RATE: 89 BPM | DIASTOLIC BLOOD PRESSURE: 71 MMHG | OXYGEN SATURATION: 98 % | BODY MASS INDEX: 20.92 KG/M2 | SYSTOLIC BLOOD PRESSURE: 122 MMHG

## 2021-01-12 DIAGNOSIS — S06.0X0D CONCUSSION WITHOUT LOSS OF CONSCIOUSNESS, SUBSEQUENT ENCOUNTER: ICD-10-CM
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
## 2021-01-12 DIAGNOSIS — M25.511 ACUTE PAIN OF RIGHT SHOULDER: ICD-10-CM

## 2021-01-12 PROCEDURE — 99214 OFFICE O/P EST MOD 30 MIN: CPT | Performed by: FAMILY MEDICINE
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
## 2021-01-12 NOTE — TELEPHONE ENCOUNTER
----- Message from Antelmo Carrera MD sent at 1/12/2021 10:19 AM CST -----  He's having some problems getting the right meter I'm not sure exactly what he means or needs can you call him or hospital discharge pharmacy?

## 2021-01-12 NOTE — LETTER
Otto Hughes  [address] [address]  : 1970  MRN:  [id number]      2021          To whom it may concern:  Mr. Hughes is my patient. For medical reasons he should not work today tomorrow or . He can return to work full time and full duty Friday January 15 2021.  Antelmo Carrera MD
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
## 2021-02-02 ENCOUNTER — TELEPHONE (OUTPATIENT)
Dept: FAMILY MEDICINE | Facility: CLINIC | Age: 51
End: 2021-02-02

## 2021-02-02 ENCOUNTER — VIRTUAL VISIT (OUTPATIENT)
Dept: PHARMACY | Facility: CLINIC | Age: 51
End: 2021-02-02
Payer: COMMERCIAL

## 2021-02-02 DIAGNOSIS — E11.9 TYPE 2 DIABETES MELLITUS WITHOUT COMPLICATION, UNSPECIFIED WHETHER LONG TERM INSULIN USE (H): Primary | ICD-10-CM

## 2021-02-02 PROCEDURE — 99605 MTMS BY PHARM NP 15 MIN: CPT | Mod: TEL | Performed by: PHARMACIST

## 2021-02-02 NOTE — PROGRESS NOTES
Medication Therapy Management (MTM) Encounter    ASSESSMENT:                            Medication Adherence/Access: No issues identified    Type 2 Diabetes: Patient is not meeting A1c goal of < 7%. Self monitoring of blood glucose is not at goal of fasting  mg/dL. Patient would benefit from dose increase in Lantus to achieve FPG goal.    PLAN:                            1. Increase Lantus to 15 units daily. Continue to monitor SMBG twice daily.     Follow-up: 2 weeks    SUBJECTIVE/OBJECTIVE:                          Otto Hughes is a 50 year old male called for a follow-up visit. He was referred to me from Dr. Carrera.  Today's visit is a follow-up MTM visit from 20. The first time I call, there is no answer. Our visit starts late as a result. This will serve as our initial visit of .     Reason for visit: diabetes follow-up.    Allergies/ADRs: Reviewed in chart  Tobacco: He reports that he quit smoking about 13 years ago. He has a 8.50 pack-year smoking history. He has never used smokeless tobacco.  Alcohol: none  Caffeine: 3-4 cups/day of coffee  Activity: walks most days of the week  Past Medical History: Reviewed in chart    Medication Adherence/Access: no issues reported    Type 2 Diabetes:  Currently taking metformin XR 2 g daily, Lantus 10 units daily and Januvia 50 mg daily. Patient is not experiencing side effects.. He has concerns about losing weight despite his sugars improving a great deal. He knows he is not at goal and continues to be concerned about his weight loss.     Wt Readings from Last 5 Encounters:   21 150 lb (68 kg)   21 149 lb 11.2 oz (67.9 kg)   21 160 lb (72.6 kg)   20 157 lb (71.2 kg)   20 155 lb (70.3 kg)       Blood sugar monitorin time(s) daily. Ranges (from patient's glucose log):     Sugars are better. He reports his average is around 230 mg/dL. He does not have specific values to share.     Symptoms of low blood sugar?  "none  Symptoms of high blood sugar? Weight loss  Eye exam: due  Foot exam: due  Diet/Exercise: none  Aspirin: Taking 81mg daily and denies side effects  Statin: Yes: simvastatin 20 mg daily   ACEi/ARB: Yes: lisinopril 10 mg daily.   Urine Albumin:   Lab Results   Component Value Date    UMALCR Unable to calculate due to low value 11/30/2020      Lab Results   Component Value Date    A1C 12.7 11/30/2020    A1C 13.4 02/04/2020    A1C 11.0 09/17/2018    A1C 11.2 11/14/2017    A1C 10.2 01/31/2017       Today's Vitals: There were no vitals taken for this visit. - telemed    BP Readings from Last 1 Encounters:   01/12/21 122/71     Pulse Readings from Last 1 Encounters:   01/12/21 89     Wt Readings from Last 1 Encounters:   01/12/21 150 lb (68 kg)     Ht Readings from Last 1 Encounters:   01/08/21 5' 11\" (1.803 m)     Estimated body mass index is 20.92 kg/m  as calculated from the following:    Height as of 1/8/21: 5' 11\" (1.803 m).    Weight as of 1/12/21: 150 lb (68 kg).    Temp Readings from Last 1 Encounters:   01/08/21 97.5  F (36.4  C)     ----------------      I spent 8 minutes with this patient today. All changes were made via collaborative practice agreement with Edilberto Carrera. A copy of the visit note was provided to the patient's primary care provider.    The patient declined a summary of these recommendations.     Josephine Mendez, Pharm.D., [facility]  Medication Therapy Management Pharmacist  Page/VM:  [phone]      Telemedicine Visit Details  Type of service:  Telephone visit  Start Time: 5:45 PM  End Time: 5:53 PM  Originating Location (patient location): [place]  Distant Location (provider location):  [facility] MEDICATION THERAPY MANAGEMENT      Medication Therapy Recommendations  No medication therapy recommendations to display      "

## 2021-02-02 NOTE — TELEPHONE ENCOUNTER
[facility] Call Center    Phone Message    May a detailed message be left on voicemail: yes     Reason for Call: Other: The patient said he missed a call from the care team please review and follow up asap thank you.  he said it was about his diabetes.    Action Taken: Message routed to:  [facility]    Travel Screening: Not Applicable

## 2021-02-16 ENCOUNTER — VIRTUAL VISIT (OUTPATIENT)
Dept: PHARMACY | Facility: CLINIC | Age: 51
End: 2021-02-16
Payer: COMMERCIAL

## 2021-02-16 DIAGNOSIS — E11.9 TYPE 2 DIABETES MELLITUS WITHOUT COMPLICATION, UNSPECIFIED WHETHER LONG TERM INSULIN USE (H): Primary | ICD-10-CM

## 2021-02-16 PROCEDURE — 99607 MTMS BY PHARM ADDL 15 MIN: CPT | Performed by: PHARMACIST

## 2021-02-16 PROCEDURE — 99606 MTMS BY PHARM EST 15 MIN: CPT | Mod: TEL | Performed by: PHARMACIST
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
## 2021-02-16 NOTE — LETTER
February 19, 2021      Serafin Menjivar,    Please find a summary of over-the-counter natural supplements that are safe to try for diabetes management.     Cinnamon Capsules   Take 500-1000 mg twice daily  Can reduce blood sugar up to 25 points on your meter.     Psyllium (Metamucil)  Take 1-2 teaspoons (5-10g)  in 8 ounces of liquid daily   Using 3-15 g of fiber daily can reduce fasting blood sugars and A1c.     I'll call again on 3/9 to check on sugars!    Jesus Mendez, Pharm.D., [facility]  Medication Therapy Management Pharmacist  Page/VM:  [phone]

## 2021-03-07 ENCOUNTER — TELEPHONE (OUTPATIENT)
Dept: FAMILY MEDICINE | Facility: CLINIC | Age: 51
End: 2021-03-07

## 2021-03-07 DIAGNOSIS — E11.9 TYPE 2 DIABETES MELLITUS WITHOUT COMPLICATION, UNSPECIFIED WHETHER LONG TERM INSULIN USE (H): Primary | ICD-10-CM

## 2021-03-07 NOTE — CONFIDENTIAL NOTE
1/12/2021  [facility] Antelmo Rollins MD  Family Medicine     Test strips

## 2021-05-17 DIAGNOSIS — E11.65 TYPE 2 DIABETES MELLITUS WITH HYPERGLYCEMIA, UNSPECIFIED WHETHER LONG TERM INSULIN USE (H): ICD-10-CM

## 2021-05-17 DIAGNOSIS — E78.5 HYPERLIPIDEMIA, UNSPECIFIED HYPERLIPIDEMIA TYPE: ICD-10-CM

## 2021-05-18 RX ORDER — SIMVASTATIN 20 MG
20 TABLET ORAL AT BEDTIME
Qty: 90 TABLET | Refills: 1 | Status: SHIPPED | OUTPATIENT
Start: 2021-05-18 | End: 2021-12-31

## 2021-05-18 NOTE — TELEPHONE ENCOUNTER
simvastatin (ZOCOR) 20 MG tablet    Take 1 tablet (20 mg) by mouth At Bedtime     Last Written Prescription Date:  11/18/20  Last Fill Quantity: 90,   # refills: 1  Last Office Visit : 1/12/21  Future Office visit:  none    Routing  because:  Overdue LDL   Lab Test 02/04/20  1127   LDL 77     90 day to pharmacy. Susana notified.

## 2021-09-24 ENCOUNTER — TELEPHONE (OUTPATIENT)
Dept: PHARMACY | Facility: CLINIC | Age: 51
End: 2021-09-24

## 2021-09-24 NOTE — TELEPHONE ENCOUNTER
PHARMACY TELEPHONE ENCOUNTER:     Reason: Schedule visit        I called this patient today in an attempt to schedule a visit. I left a voicemail for the patient to call back to schedule this appointment with me.      Troy Harmon, Pharm. D.   Medication Therapy Management Pharmacist

## 2021-10-15 DIAGNOSIS — E11.9 DIABETES MELLITUS, TYPE 2 (H): ICD-10-CM

## 2021-10-17 RX ORDER — LANCETS
EACH MISCELLANEOUS
Qty: 300 EACH | Refills: 0 | Status: SHIPPED | OUTPATIENT
Start: 2021-10-17 | End: 2021-12-09

## 2021-10-17 NOTE — TELEPHONE ENCOUNTER
Lancets      1/12/2021  [facility] [place]     Antelmo Carrera MD    Family Medicine    NV: none

## 2021-11-15 DIAGNOSIS — E11.9 DM TYPE 2, GOAL HBA1C 7%-8% (H): ICD-10-CM

## 2021-11-15 NOTE — TELEPHONE ENCOUNTER
Last Clinic Visit: 1/12/2021  [facility]    Test(s)- A1C past due.  1st nasir refill of Januvia was sent for a 90 day supply and FYI to [facility] clinic.

## 2021-11-29 DIAGNOSIS — E11.65 TYPE 2 DIABETES MELLITUS WITH HYPERGLYCEMIA, UNSPECIFIED WHETHER LONG TERM INSULIN USE (H): ICD-10-CM

## 2021-11-30 RX ORDER — METFORMIN HCL 500 MG
2000 TABLET, EXTENDED RELEASE 24 HR ORAL
Qty: 360 TABLET | Refills: 0 | Status: SHIPPED | OUTPATIENT
Start: 2021-11-30 | End: 2022-03-07

## 2021-11-30 NOTE — TELEPHONE ENCOUNTER
metFORMIN (GLUCOPHAGE-XR) 500 MG 24 hr tablet     Take 4 tablets (2,000 mg) by mouth daily (with dinner)    Last Written Prescription Date:  11/27/20  Last Fill Quantity: 360,   # refills: 3  Last Office Visit : 1/12/21  Future Office visit:  none    Routing refill request to provider for review/approval because:  Overdue appointment and labs A1c, micro albumin, LDL.   FYI to clinic.     90 day nasir to pharmacy. Scheduling has been notified to contact the pt for appointment.

## 2021-12-09 DIAGNOSIS — E11.9 DIABETES MELLITUS, TYPE 2 (H): ICD-10-CM

## 2021-12-09 RX ORDER — LANCETS
EACH MISCELLANEOUS
Qty: 300 EACH | Refills: 3 | Status: SHIPPED | OUTPATIENT
Start: 2021-12-09 | End: 2022-04-22

## 2021-12-30 DIAGNOSIS — E11.65 TYPE 2 DIABETES MELLITUS WITH HYPERGLYCEMIA, UNSPECIFIED WHETHER LONG TERM INSULIN USE (H): ICD-10-CM

## 2021-12-30 DIAGNOSIS — E78.5 HYPERLIPIDEMIA, UNSPECIFIED HYPERLIPIDEMIA TYPE: ICD-10-CM

## 2021-12-30 DIAGNOSIS — E11.9 DM TYPE 2, GOAL HBA1C 7%-8% (H): ICD-10-CM

## 2021-12-30 DIAGNOSIS — E11.9 TYPE 2 DIABETES MELLITUS WITHOUT COMPLICATION, UNSPECIFIED WHETHER LONG TERM INSULIN USE (H): ICD-10-CM

## 2021-12-31 RX ORDER — LISINOPRIL 10 MG/1
10 TABLET ORAL DAILY
Qty: 30 TABLET | Refills: 0 | Status: SHIPPED | OUTPATIENT
Start: 2021-12-31 | End: 2022-02-02

## 2021-12-31 RX ORDER — SIMVASTATIN 20 MG
20 TABLET ORAL AT BEDTIME
Qty: 30 TABLET | Refills: 0 | Status: SHIPPED | OUTPATIENT
Start: 2021-12-31 | End: 2022-02-02

## 2022-01-11 ENCOUNTER — OFFICE VISIT (OUTPATIENT)
Dept: FAMILY MEDICINE | Facility: CLINIC | Age: 52
End: 2022-01-11
Payer: COMMERCIAL

## 2022-01-11 VITALS — DIASTOLIC BLOOD PRESSURE: 80 MMHG | HEART RATE: 90 BPM | SYSTOLIC BLOOD PRESSURE: 121 MMHG | OXYGEN SATURATION: 99 %

## 2022-01-11 DIAGNOSIS — E11.9 DM TYPE 2, GOAL HBA1C 7%-8% (H): ICD-10-CM

## 2022-01-11 DIAGNOSIS — R91.8 PULMONARY NODULES: ICD-10-CM

## 2022-01-11 DIAGNOSIS — I77.819 ECTATIC AORTA (H): ICD-10-CM

## 2022-01-11 DIAGNOSIS — Z00.00 HEALTH CARE MAINTENANCE: Primary | ICD-10-CM

## 2022-01-11 PROCEDURE — 99396 PREV VISIT EST AGE 40-64: CPT | Performed by: FAMILY MEDICINE

## 2022-01-11 ASSESSMENT — PAIN SCALES - GENERAL: PAINLEVEL: NO PAIN (0)

## 2022-01-11 NOTE — PATIENT INSTRUCTIONS
To schedule imaging, please call radiology scheduling at [phone].     To schedule with lab, please call at [phone].

## 2022-01-11 NOTE — NURSING NOTE
Chief Complaint   Patient presents with     Recheck Medication     follow up       Tena Jaimes, EMT at 7:45 AM on 1/11/2022  
yes...
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
## 2022-01-25 DIAGNOSIS — N52.8 OTHER MALE ERECTILE DYSFUNCTION: ICD-10-CM

## 2022-01-28 RX ORDER — SILDENAFIL 100 MG/1
TABLET, FILM COATED ORAL
Qty: 20 TABLET | Refills: 11 | Status: SHIPPED | OUTPATIENT
Start: 2022-01-28 | End: 2023-02-14

## 2022-01-30 DIAGNOSIS — E78.5 HYPERLIPIDEMIA, UNSPECIFIED HYPERLIPIDEMIA TYPE: ICD-10-CM

## 2022-01-30 DIAGNOSIS — E11.65 TYPE 2 DIABETES MELLITUS WITH HYPERGLYCEMIA, UNSPECIFIED WHETHER LONG TERM INSULIN USE (H): ICD-10-CM

## 2022-01-30 DIAGNOSIS — E11.9 TYPE 2 DIABETES MELLITUS WITHOUT COMPLICATION, UNSPECIFIED WHETHER LONG TERM INSULIN USE (H): ICD-10-CM

## 2022-02-02 RX ORDER — SIMVASTATIN 20 MG
20 TABLET ORAL AT BEDTIME
Qty: 90 TABLET | Refills: 0 | Status: SHIPPED | OUTPATIENT
Start: 2022-02-02 | End: 2022-05-09

## 2022-02-02 RX ORDER — LISINOPRIL 10 MG/1
10 TABLET ORAL DAILY
Qty: 90 TABLET | Refills: 0 | Status: SHIPPED | OUTPATIENT
Start: 2022-02-02 | End: 2022-05-09

## 2022-02-02 NOTE — ADDENDUM NOTE
Addended by: ROSARIO NICHOLSON on: 6/17/2019 09:31 AM     Modules accepted: Orders     Quality 110: Preventive Care And Screening: Influenza Immunization: Influenza Immunization Administered during Influenza season Quality 130: Documentation Of Current Medications In The Medical Record: Current Medications Documented Quality 431: Preventive Care And Screening: Unhealthy Alcohol Use - Screening: Patient not identified as an unhealthy alcohol user when screened for unhealthy alcohol use using a systematic screening method Quality 226: Preventive Care And Screening: Tobacco Use: Screening And Cessation Intervention: Patient screened for tobacco use and is an ex/non-smoker Detail Level: Detailed

## 2022-02-02 NOTE — TELEPHONE ENCOUNTER
simvastatin (ZOCOR) 20 MG tablet  Last Written Prescription Date:  12/31/2021  Last Fill Quantity: 30,   # refills: 0  Last Office Visit :  1/11/2022  Future Office visit:  None  Routing refill request to provider for review/approval because:  Second Request     Over due LIPIDS  Refer to clinic for review     lisinopril (ZESTRIL) 10 MG tablet  Last Written Prescription Date:  12/31/2021  Last Fill Quantity: 30,   # refills: 0  Last Office Visit :  1/11/2022  Future Office visit:  None  Routing refill request to provider for review/approval because:  Second Request     Over due Creatinine and Potassium   Refer to clinic for review       Dahiana Birch RN  Central Triage Red Flags/Med Refills

## 2022-02-15 ENCOUNTER — LAB (OUTPATIENT)
Dept: LAB | Facility: CLINIC | Age: 52
End: 2022-02-15
Attending: FAMILY MEDICINE
Payer: COMMERCIAL

## 2022-02-15 ENCOUNTER — TELEPHONE (OUTPATIENT)
Dept: INTERNAL MEDICINE | Facility: CLINIC | Age: 52
End: 2022-02-15

## 2022-02-15 DIAGNOSIS — E11.9 T2DM (TYPE 2 DIABETES MELLITUS) (H): Primary | ICD-10-CM

## 2022-02-15 DIAGNOSIS — E11.9 TYPE 2 DIABETES MELLITUS WITHOUT COMPLICATION, WITH LONG-TERM CURRENT USE OF INSULIN (H): ICD-10-CM

## 2022-02-15 DIAGNOSIS — E11.9 DM TYPE 2, GOAL HBA1C 7%-8% (H): ICD-10-CM

## 2022-02-15 DIAGNOSIS — Z79.4 TYPE 2 DIABETES MELLITUS WITHOUT COMPLICATION, WITH LONG-TERM CURRENT USE OF INSULIN (H): ICD-10-CM

## 2022-02-15 LAB
ALBUMIN SERPL-MCNC: 3.5 G/DL (ref 3.4–5)
ALP SERPL-CCNC: 65 U/L (ref 40–150)
ALT SERPL W P-5'-P-CCNC: 16 U/L (ref 0–70)
ANION GAP SERPL CALCULATED.3IONS-SCNC: 5 MMOL/L (ref 3–14)
AST SERPL W P-5'-P-CCNC: 8 U/L (ref 0–45)
BILIRUB SERPL-MCNC: 0.5 MG/DL (ref 0.2–1.3)
BUN SERPL-MCNC: 13 MG/DL (ref 7–30)
CALCIUM SERPL-MCNC: 8.5 MG/DL (ref 8.5–10.1)
CHLORIDE BLD-SCNC: 107 MMOL/L (ref 94–109)
CHOLEST SERPL-MCNC: 124 MG/DL
CO2 SERPL-SCNC: 28 MMOL/L (ref 20–32)
CREAT SERPL-MCNC: 0.77 MG/DL (ref 0.66–1.25)
CREAT UR-MCNC: 129 MG/DL
FASTING STATUS PATIENT QL REPORTED: YES
GFR SERPL CREATININE-BSD FRML MDRD: >90 ML/MIN/1.73M2
GLUCOSE BLD-MCNC: 254 MG/DL (ref 70–99)
HBA1C MFR BLD: 10.5 % (ref 0–5.6)
HDLC SERPL-MCNC: 43 MG/DL
LDLC SERPL CALC-MCNC: 61 MG/DL
MICROALBUMIN UR-MCNC: 21 MG/L
MICROALBUMIN/CREAT UR: 16.28 MG/G CR (ref 0–17)
NONHDLC SERPL-MCNC: 81 MG/DL
POTASSIUM BLD-SCNC: 4.2 MMOL/L (ref 3.4–5.3)
PROT SERPL-MCNC: 7.2 G/DL (ref 6.8–8.8)
SODIUM SERPL-SCNC: 140 MMOL/L (ref 133–144)
TRIGL SERPL-MCNC: 101 MG/DL
TSH SERPL DL<=0.005 MIU/L-ACNC: 1.21 MU/L (ref 0.4–4)

## 2022-02-15 PROCEDURE — 80053 COMPREHEN METABOLIC PANEL: CPT | Performed by: PATHOLOGY

## 2022-02-15 PROCEDURE — 80061 LIPID PANEL: CPT | Performed by: PATHOLOGY

## 2022-02-15 PROCEDURE — 36415 COLL VENOUS BLD VENIPUNCTURE: CPT | Performed by: PATHOLOGY

## 2022-02-15 PROCEDURE — 82043 UR ALBUMIN QUANTITATIVE: CPT | Performed by: PATHOLOGY

## 2022-02-15 PROCEDURE — 84443 ASSAY THYROID STIM HORMONE: CPT | Performed by: PATHOLOGY

## 2022-02-15 PROCEDURE — 83036 HEMOGLOBIN GLYCOSYLATED A1C: CPT | Performed by: PATHOLOGY

## 2022-02-15 NOTE — TELEPHONE ENCOUNTER
Pt was informed the results an d recommendations. He agreed to have MTM referral.    [facility]  -----------------------------------------------------------------------------        ----- Message from Antelmo Carrera MD sent at 2/15/2022 12:11 PM CST -----  Can you let him know labs ok except sugars much too high  Suggest he work w/ Troy ReedD on that  If ok w/ that refer to him for DM 2  Should see me in May recheck

## 2022-02-21 ENCOUNTER — VIRTUAL VISIT (OUTPATIENT)
Dept: PHARMACY | Facility: CLINIC | Age: 52
End: 2022-02-21
Attending: FAMILY MEDICINE
Payer: COMMERCIAL

## 2022-02-21 DIAGNOSIS — I11.9 HYPERTENSIVE HEART DISEASE WITHOUT HEART FAILURE: ICD-10-CM

## 2022-02-21 DIAGNOSIS — E11.9 TYPE 2 DIABETES MELLITUS WITHOUT COMPLICATION, UNSPECIFIED WHETHER LONG TERM INSULIN USE (H): Primary | ICD-10-CM

## 2022-02-21 PROCEDURE — 99605 MTMS BY PHARM NP 15 MIN: CPT | Performed by: PHARMACIST

## 2022-02-21 PROCEDURE — 99607 MTMS BY PHARM ADDL 15 MIN: CPT | Performed by: PHARMACIST

## 2022-02-21 RX ORDER — ORAL SEMAGLUTIDE 3 MG/1
3 TABLET ORAL DAILY
Qty: 30 TABLET | Refills: 0 | Status: SHIPPED | OUTPATIENT
Start: 2022-02-21 | End: 2022-04-13

## 2022-02-21 RX ORDER — ORAL SEMAGLUTIDE 7 MG/1
7 TABLET ORAL DAILY
Qty: 30 TABLET | Refills: 1 | Status: SHIPPED | OUTPATIENT
Start: 2022-02-21 | End: 2023-08-14

## 2022-02-21 NOTE — PATIENT INSTRUCTIONS
Recommendations from today's MTM visit:                                                    MTM (medication therapy management) is a service provided by a clinical pharmacist designed to help you get the most of out of your medicines.   Today we reviewed what your medicines are for, how to know if they are working, that your medicines are safe and how to make your medicine regimen as easy as possible.      1. Call this number to schedule with the Dental Clinic: [phone]  2. Call this number to schedule with the Eye clinic: [phone]  3. Start Rybelsus 3 mg by mouth once daily 30 minutes before first meal. Bring included coupon to pick this up. Stop Januvia when you start Rybelsus.    Follow-up: Return in about 29 days (around 3/22/2022) for Follow up, with me, using a phone visit.    It was great to speak with you today.  I value your experience and would be very thankful for your time with providing feedback on our clinic survey. You may receive a survey via email or text message in the next few days.     To schedule another MTM appointment, please call the clinic directly or you may call the MTM scheduling line at [phone] or toll-free at [phone].     My Clinical Pharmacist's contact information:                                                      Please feel free to contact me with any questions or concerns you have.      Troy Harmon, Pharm. D., [facility]  Medication Therapy Management Pharmacist  Direct Voicemail: [phone]

## 2022-02-21 NOTE — PROGRESS NOTES
Medication Therapy Management (MTM) Encounter    ASSESSMENT:                            Medication Adherence/Access: No issues identified    Type 2 Diabetes: Patient is not meeting A1c goal of < 7%. Self monitoring of blood glucose is not at goal of fasting  mg/dL and post prandial < 180 mg/dL. Patient would benefit from an intesified diabetes regimen. A GLP-1 would have a larger affect then his low dose Januvia and may also help with his sugar cravings. Since he denies further injections at this point we will attempt to initiate Rybelsus with the use of  coupons. He asked if he could just control this with diet and exercise, but based off his A1c being >10% this is unlikely and needs medication.    Hypertension: Stable.    PLAN:                            1. Call this number to schedule with the Dental Clinic: [phone]  2. Call this number to schedule with the Eye clinic: [phone]  3. Start Rybelsus 3 mg by mouth once daily 30 minutes before first meal. Bring included coupon to pick this up. Stop Januvia when you start Rybelsus.    Follow-up: Return in about 29 days (around 3/22/2022) for Follow up, with me, using a phone visit.    SUBJECTIVE/OBJECTIVE:                          Otto Hughes is a 51 year old male called for an initial visit. He was referred to me from Antelmo Carrera MD.      Reason for visit: diabetes follow-up.    Allergies/ADRs: Reviewed in chart  Past Medical History: Reviewed in chart  Tobacco: He reports that he quit smoking about 14 years ago. He has a 8.50 pack-year smoking history. He has never used smokeless tobacco.  Alcohol: none      Medication Adherence/Access: no issues reported    Type 2 Diabetes:  Currently taking Lantus 16 units, metformin XR 2000 mg daily, Januvia 50 mg. Patient is not experiencing side effects.  Blood sugar monitoring: one time(s) daily. Ranges (patient reported): States that his blood sugar is usually 200 mg/dL  Symptoms of low blood  sugar? none  Symptoms of high blood sugar? none  Eye exam: due  Foot exam: due  Diet/Exercise: Reports that he thinks that he is addicted to sugar. He reports that he will crave sugar when he gets home. Reports that he has been walking a lot.   Aspirin: No  Statin: Yes: simvastatin   ACEi/ARB: Yes: lisinopril.   Urine Albumin:   Lab Results   Component Value Date    UMALCR 16.28 02/15/2022      Lab Results   Component Value Date    A1C 10.5 02/15/2022    A1C 12.7 11/30/2020    A1C 13.4 02/04/2020    A1C 11.0 09/17/2018    A1C 11.2 11/14/2017    A1C 10.2 01/31/2017          Lab Results   Component Value Date    CHOL 124 02/15/2022    CHOL 157 02/04/2020     Lab Results   Component Value Date    HDL 43 02/15/2022    HDL 44 02/04/2020     Lab Results   Component Value Date    LDL 61 02/15/2022    LDL 77 02/04/2020     Lab Results   Component Value Date    TRIG 101 02/15/2022    TRIG 180 02/04/2020     Lab Results   Component Value Date    CHOLHDLRATIO 2.6 09/15/2014         Hypertension: Current medications include lisinopril 10 mg.  Patient does not self-monitor blood pressure.  Patient reports no current medication side effects.  BP Readings from Last 3 Encounters:   01/11/22 121/80   01/12/21 122/71   01/08/21 125/76     Gum Disease: Currently taking chlorhexidine 0.12% swish and spit and 15 mL twice a day.     Today's Vitals: There were no vitals taken for this visit.  ----------------      I spent 31 minutes with this patient today. All changes were made via collaborative practice agreement with Antelmo Carrera MD. A copy of the visit note was provided to the patient's provider(s).    The patient was mailed a summary of these recommendations.     Troy Harmon, Pharm. D., [facility]  Medication Therapy Management Pharmacist  Direct Voicemail: [phone]      Telemedicine Visit Details  Type of service:  Telephone visit  Start Time: 2:00 pm  End Time: 2:31 PM  Originating Location (patient location): Home  [place]  Location (provider location):  [facility]     Medication Therapy Recommendations  T2DM (type 2 diabetes mellitus) (H)    Current Medication: sitagliptin (JANUVIA) 50 MG tablet (Discontinued)   Rationale: More effective medication available - Ineffective medication - Effectiveness   Recommendation: Change Medication - Rybelsus 3 MG Tabs   Status: Accepted per CPA

## 2022-03-04 DIAGNOSIS — E11.65 TYPE 2 DIABETES MELLITUS WITH HYPERGLYCEMIA, UNSPECIFIED WHETHER LONG TERM INSULIN USE (H): ICD-10-CM

## 2022-03-07 RX ORDER — METFORMIN HCL 500 MG
2000 TABLET, EXTENDED RELEASE 24 HR ORAL
Qty: 360 TABLET | Refills: 1 | Status: SHIPPED | OUTPATIENT
Start: 2022-03-07 | End: 2022-09-13

## 2022-03-07 NOTE — TELEPHONE ENCOUNTER
Last Clinic Visit: 1/11/2022  [facility] [place]      
Statement Selected

## 2022-04-01 DIAGNOSIS — E11.9 TYPE 2 DIABETES MELLITUS WITHOUT COMPLICATION, UNSPECIFIED WHETHER LONG TERM INSULIN USE (H): Primary | ICD-10-CM

## 2022-04-01 RX ORDER — INSULIN GLARGINE 100 [IU]/ML
15 INJECTION, SOLUTION SUBCUTANEOUS DAILY
Qty: 15 ML | Refills: 1 | Status: SHIPPED | OUTPATIENT
Start: 2022-04-01 | End: 2022-08-16

## 2022-04-01 NOTE — TELEPHONE ENCOUNTER
insulin glargine (LANTUS SOLOSTAR) 100 UNIT/ML pen  Last Written Prescription Date:   2/2/2021  Last Fill Quantity: 15,   # refills: 3  Last Office Visit :  1/11/2022  Future Office visit:  None  Routing refill request to provider for review/approval because:  Refer to clinic for review     insulin pen needle (32G X 4 MM) 32G X 4 MM miscellaneous  Last Written Prescription Date:   ?  Last Fill Quantity: ?,   # refills: ?  Last Office Visit :  1/11/2022  Future Office visit:  None  Routing refill request to provider for review/approval because:  Please send new order for different size pen needles and how many to use each day.     Dahiana Birch RN  Central Triage Red Flags/Med Refills

## 2022-04-10 DIAGNOSIS — E11.9 TYPE 2 DIABETES MELLITUS WITHOUT COMPLICATION, UNSPECIFIED WHETHER LONG TERM INSULIN USE (H): ICD-10-CM

## 2022-04-13 RX ORDER — ORAL SEMAGLUTIDE 3 MG/1
3 TABLET ORAL DAILY
Qty: 30 TABLET | Refills: 5 | Status: SHIPPED | OUTPATIENT
Start: 2022-04-13 | End: 2022-12-02

## 2022-04-13 NOTE — TELEPHONE ENCOUNTER
Semaglutide (RYBELSUS) 3 MG TABS      Take 3 mg by mouth daily  Last Written Prescription Date:  2-21-22  Last Fill Quantity: 30,   # refills: 0  Last Office Visit : 1-11-22  Future Office visit:  none    Also on med list:  Semaglutide (RYBELSUS) 7 MG TABS 30 tablet 1 2/21/2022  --   Sig - Route: Take 7 mg by mouth daily - Oral   Notes to Pharmacy: To be filled after 30 days on 3 mg dose. Patient has a coupon    Routing refill request to provider for review/approval because:  Last fill 30 tab:0 RF, ? Discontinue - ? 7 mg daily

## 2022-04-21 DIAGNOSIS — E11.9 TYPE 2 DIABETES MELLITUS WITHOUT COMPLICATION, UNSPECIFIED WHETHER LONG TERM INSULIN USE (H): ICD-10-CM

## 2022-04-21 DIAGNOSIS — E11.9 DIABETES MELLITUS, TYPE 2 (H): ICD-10-CM

## 2022-04-22 RX ORDER — LANCETS
EACH MISCELLANEOUS
Qty: 300 EACH | Refills: 2 | Status: SHIPPED | OUTPATIENT
Start: 2022-04-22 | End: 2023-07-26

## 2022-05-09 DIAGNOSIS — E11.65 TYPE 2 DIABETES MELLITUS WITH HYPERGLYCEMIA, UNSPECIFIED WHETHER LONG TERM INSULIN USE (H): ICD-10-CM

## 2022-05-09 DIAGNOSIS — E11.9 TYPE 2 DIABETES MELLITUS WITHOUT COMPLICATION, UNSPECIFIED WHETHER LONG TERM INSULIN USE (H): ICD-10-CM

## 2022-05-09 DIAGNOSIS — E78.5 HYPERLIPIDEMIA, UNSPECIFIED HYPERLIPIDEMIA TYPE: ICD-10-CM

## 2022-05-09 RX ORDER — SIMVASTATIN 20 MG
20 TABLET ORAL AT BEDTIME
Qty: 90 TABLET | Refills: 2 | Status: SHIPPED | OUTPATIENT
Start: 2022-05-09 | End: 2023-02-14

## 2022-05-09 RX ORDER — LISINOPRIL 10 MG/1
10 TABLET ORAL DAILY
Qty: 90 TABLET | Refills: 2 | Status: SHIPPED | OUTPATIENT
Start: 2022-05-09 | End: 2023-05-01

## 2022-08-16 DIAGNOSIS — E11.9 TYPE 2 DIABETES MELLITUS WITHOUT COMPLICATION, UNSPECIFIED WHETHER LONG TERM INSULIN USE (H): ICD-10-CM

## 2022-08-16 RX ORDER — INSULIN GLARGINE 100 [IU]/ML
15 INJECTION, SOLUTION SUBCUTANEOUS DAILY
Qty: 15 ML | Refills: 0 | Status: SHIPPED | OUTPATIENT
Start: 2022-08-16 | End: 2023-06-05

## 2022-08-16 NOTE — TELEPHONE ENCOUNTER
Last Clinic Visit: 1/11/2022  [facility] [place]  Recommended 3 month follow up, no upcoming appointments  Refill provided, routed to clinic scheduling for follow up

## 2022-09-02 ENCOUNTER — LAB (OUTPATIENT)
Dept: LAB | Facility: CLINIC | Age: 52
End: 2022-09-02
Payer: COMMERCIAL

## 2022-09-02 ENCOUNTER — OFFICE VISIT (OUTPATIENT)
Dept: FAMILY MEDICINE | Facility: CLINIC | Age: 52
End: 2022-09-02
Payer: COMMERCIAL

## 2022-09-02 VITALS
WEIGHT: 148 LBS | HEART RATE: 74 BPM | BODY MASS INDEX: 20.72 KG/M2 | HEIGHT: 71 IN | OXYGEN SATURATION: 97 % | DIASTOLIC BLOOD PRESSURE: 79 MMHG | SYSTOLIC BLOOD PRESSURE: 131 MMHG

## 2022-09-02 DIAGNOSIS — L65.9 HAIR LOSS: ICD-10-CM

## 2022-09-02 DIAGNOSIS — E11.65 TYPE 2 DIABETES MELLITUS WITH HYPERGLYCEMIA, UNSPECIFIED WHETHER LONG TERM INSULIN USE (H): Primary | ICD-10-CM

## 2022-09-02 DIAGNOSIS — E11.65 TYPE 2 DIABETES MELLITUS WITH HYPERGLYCEMIA, UNSPECIFIED WHETHER LONG TERM INSULIN USE (H): ICD-10-CM

## 2022-09-02 DIAGNOSIS — E78.5 HYPERLIPIDEMIA, UNSPECIFIED HYPERLIPIDEMIA TYPE: ICD-10-CM

## 2022-09-02 DIAGNOSIS — Z00.00 HEALTH CARE MAINTENANCE: Primary | ICD-10-CM

## 2022-09-02 LAB
ALBUMIN SERPL BCG-MCNC: 4.3 G/DL (ref 3.5–5.2)
ALP SERPL-CCNC: 70 U/L (ref 40–129)
ALT SERPL W P-5'-P-CCNC: 6 U/L (ref 10–50)
ANION GAP SERPL CALCULATED.3IONS-SCNC: 12 MMOL/L (ref 7–15)
AST SERPL W P-5'-P-CCNC: 15 U/L (ref 10–50)
BASOPHILS # BLD AUTO: 0.1 10E3/UL (ref 0–0.2)
BASOPHILS NFR BLD AUTO: 1 %
BILIRUB SERPL-MCNC: 0.8 MG/DL
BUN SERPL-MCNC: 2.4 MG/DL (ref 6–20)
CALCIUM SERPL-MCNC: 9.2 MG/DL (ref 8.6–10)
CHLORIDE SERPL-SCNC: 104 MMOL/L (ref 98–107)
CHOLEST SERPL-MCNC: 133 MG/DL
CREAT SERPL-MCNC: 0.67 MG/DL (ref 0.67–1.17)
CREAT UR-MCNC: 140 MG/DL
DEPRECATED HCO3 PLAS-SCNC: 23 MMOL/L (ref 22–29)
EOSINOPHIL # BLD AUTO: 0.1 10E3/UL (ref 0–0.7)
EOSINOPHIL NFR BLD AUTO: 1 %
ERYTHROCYTE [DISTWIDTH] IN BLOOD BY AUTOMATED COUNT: 11.8 % (ref 10–15)
FERRITIN SERPL-MCNC: 63 NG/ML (ref 31–409)
GFR SERPL CREATININE-BSD FRML MDRD: >90 ML/MIN/1.73M2
GLUCOSE SERPL-MCNC: 287 MG/DL (ref 70–99)
HCT VFR BLD AUTO: 49.3 % (ref 40–53)
HDLC SERPL-MCNC: 43 MG/DL
HGB BLD-MCNC: 16.7 G/DL (ref 13.3–17.7)
IMM GRANULOCYTES # BLD: 0 10E3/UL
IMM GRANULOCYTES NFR BLD: 0 %
IRON BINDING CAPACITY (ROCHE): 379 UG/DL (ref 240–430)
IRON SATN MFR SERPL: 26 % (ref 15–46)
IRON SERPL-MCNC: 98 UG/DL (ref 61–157)
LDLC SERPL CALC-MCNC: 74 MG/DL
LYMPHOCYTES # BLD AUTO: 2 10E3/UL (ref 0.8–5.3)
LYMPHOCYTES NFR BLD AUTO: 35 %
MCH RBC QN AUTO: 30.3 PG (ref 26.5–33)
MCHC RBC AUTO-ENTMCNC: 33.9 G/DL (ref 31.5–36.5)
MCV RBC AUTO: 89 FL (ref 78–100)
MICROALBUMIN UR-MCNC: 15.2 MG/L
MICROALBUMIN/CREAT UR: 10.86 MG/G CR (ref 0–17)
MONOCYTES # BLD AUTO: 0.6 10E3/UL (ref 0–1.3)
MONOCYTES NFR BLD AUTO: 10 %
NEUTROPHILS # BLD AUTO: 3.1 10E3/UL (ref 1.6–8.3)
NEUTROPHILS NFR BLD AUTO: 53 %
NONHDLC SERPL-MCNC: 90 MG/DL
NRBC # BLD AUTO: 0 10E3/UL
NRBC BLD AUTO-RTO: 0 /100
PLATELET # BLD AUTO: 212 10E3/UL (ref 150–450)
POTASSIUM SERPL-SCNC: 4 MMOL/L (ref 3.4–5.3)
PROT SERPL-MCNC: 7.1 G/DL (ref 6.4–8.3)
RBC # BLD AUTO: 5.52 10E6/UL (ref 4.4–5.9)
SODIUM SERPL-SCNC: 139 MMOL/L (ref 136–145)
TRIGL SERPL-MCNC: 80 MG/DL
TSH SERPL DL<=0.005 MIU/L-ACNC: 1.75 UIU/ML (ref 0.3–4.2)
WBC # BLD AUTO: 5.8 10E3/UL (ref 4–11)

## 2022-09-02 PROCEDURE — 83540 ASSAY OF IRON: CPT | Performed by: PATHOLOGY

## 2022-09-02 PROCEDURE — 83550 IRON BINDING TEST: CPT | Performed by: PATHOLOGY

## 2022-09-02 PROCEDURE — 80061 LIPID PANEL: CPT | Mod: 90 | Performed by: PATHOLOGY

## 2022-09-02 PROCEDURE — 99214 OFFICE O/P EST MOD 30 MIN: CPT | Performed by: FAMILY MEDICINE

## 2022-09-02 PROCEDURE — 82728 ASSAY OF FERRITIN: CPT | Mod: 90 | Performed by: PATHOLOGY

## 2022-09-02 PROCEDURE — 82043 UR ALBUMIN QUANTITATIVE: CPT | Mod: 90 | Performed by: PATHOLOGY

## 2022-09-02 PROCEDURE — 36415 COLL VENOUS BLD VENIPUNCTURE: CPT | Performed by: PATHOLOGY

## 2022-09-02 PROCEDURE — 80050 GENERAL HEALTH PANEL: CPT | Performed by: PATHOLOGY

## 2022-09-02 PROCEDURE — 99000 SPECIMEN HANDLING OFFICE-LAB: CPT | Performed by: PATHOLOGY

## 2022-09-02 NOTE — PROGRESS NOTES
Assessment & Plan     Health care maintenance  Dental referral on pt request, he hopes to see [facility] dentist  - Dental Referral  - Adult GI  Referral - Procedure Only; Future    Type 2 diabetes mellitus with hyperglycemia, unspecified whether long term insulin use (H)  Due, he's ok follow-up Troy KING if not at goal, discussed  - Adult Eye  Referral; Future  - Lipid panel reflex to direct LDL Fasting; Future  - Comprehensive metabolic panel; Future  - Lipid panel reflex to direct LDL Fasting; Future  - Albumin Random Urine Quantitative with Creat Ratio; Future    Hair loss  Labs, see derm, likely genetic and/or metabolic syndrome related  - CBC with platelets differential; Future  - Iron and iron binding capacity; Future  - Ferritin; Future  - TSH with free T4 reflex; Future  - Adult Dermatology Referral    Hyperlipidemia, unspecified hyperlipidemia type  Cont zocor, labs        35 minutes spent on the date of the encounter doing chart review, history and exam, documentation and further activities per the note    Return in about 3 months (around 12/2/2022).    Antelmo Carrera MD  [facility] [place]    Allyson Menjivar is a 52 year old, presenting for the following health issues:  Medication Refill and Medication management      HPI Here dixie a few things  1-DM recheck, walks a lot at work, wt stable, tries to eat healthy sometimes family meals not as healthy as ideally would be  2-Due for eye MD, labs, is on statin/ace  3-htn at goal  4-lost about 20 lbs last few years, stable, wt loss was before rybelsus, he wonders if should weight more, discussed wt gain could make it harder to manage metabolic syndrome  5-requests referral to [facility] dental clinic, his choice, will refer  Overall feels well, works a lot, can walk a lot at his job   Past Medical History:   Diagnosis Date     Hyperlipidemia LDL goal < 100      T2DM (type 2 diabetes mellitus) (H)      Past  "Surgical History:   Procedure Laterality Date     NO HISTORY OF SURGERY       Current Outpatient Medications   Medication     blood glucose (NO BRAND SPECIFIED) test strip     blood glucose monitoring (NO BRAND SPECIFIED) meter device kit     blood glucose monitoring (SOFTCLIX) lancets     Blood Pressure Monitoring (BLOOD PRESSURE KIT) KIT     chlorhexidine (PERIDEX) 0.12 % solution     insulin glargine (LANTUS SOLOSTAR) 100 UNIT/ML pen     insulin pen needle (31G X 5 MM) 31G X 5 MM miscellaneous     insulin pen needle (32G X 4 MM) 32G X 4 MM miscellaneous     insulin pen needle 31G X 8 MM     lisinopril (ZESTRIL) 10 MG tablet     metFORMIN (GLUCOPHAGE-XR) 500 MG 24 hr tablet     order for DME     Semaglutide (RYBELSUS) 3 MG TABS     Semaglutide (RYBELSUS) 7 MG TABS     sildenafil (VIAGRA) 100 MG tablet     simvastatin (ZOCOR) 20 MG tablet     No current facility-administered medications for this visit.     No Known Allergies          Review of Systems         Objective    /79 (BP Location: Right arm, Patient Position: Sitting, Cuff Size: Adult Regular)   Pulse 74   Ht 1.803 m (5' 11\")   Wt 67.1 kg (148 lb)   SpO2 97%   BMI 20.64 kg/m    Body mass index is 20.64 kg/m .  Physical Exam   GENERAL: healthy, alert and no distress  NECK: no adenopathy, no asymmetry, masses, or scars and thyroid normal to palpation  RESP: lungs clear to auscultation - no rales, rhonchi or wheezes  CV: regular rate and rhythm, normal S1 S2, no S3 or S4, no murmur, click or rub, no peripheral edema and peripheral pulses strong  ABDOMEN: soft, nontender, no hepatosplenomegaly, no masses and bowel sounds normal  MS: no gross musculoskeletal defects noted, no edema  Derm; male pattern hair loss mid scalp        "

## 2022-09-02 NOTE — NURSING NOTE
"Otto Hughes is a 52 year old male patient that presents today in clinic for the following:    Chief Complaint   Patient presents with     Medication Refill     Medication management     The patient's allergies and medications were reviewed as noted. A set of vitals were recorded as noted without incident: /79 (BP Location: Right arm, Patient Position: Sitting, Cuff Size: Adult Regular)   Pulse 74   Ht 1.803 m (5' 11\")   Wt 67.1 kg (148 lb)   SpO2 97%   BMI 20.64 kg/m  . The patient does not have any other questions for the provider.    Jeannie Herrera, EMT at 8:24 AM on 9/2/2022    "

## 2022-09-07 ENCOUNTER — TELEPHONE (OUTPATIENT)
Dept: INTERNAL MEDICINE | Facility: CLINIC | Age: 52
End: 2022-09-07

## 2022-09-07 DIAGNOSIS — E11.9 T2DM (TYPE 2 DIABETES MELLITUS) (H): Primary | ICD-10-CM

## 2022-09-07 NOTE — TELEPHONE ENCOUNTER
Spoke with pt and informed the lab results (done on 9/2/22). He needs to have A1C per Dr. Carrera, so he will come to the lab some time soon.

## 2022-09-12 DIAGNOSIS — E11.65 TYPE 2 DIABETES MELLITUS WITH HYPERGLYCEMIA, UNSPECIFIED WHETHER LONG TERM INSULIN USE (H): ICD-10-CM

## 2022-09-13 RX ORDER — METFORMIN HCL 500 MG
2000 TABLET, EXTENDED RELEASE 24 HR ORAL
Qty: 360 TABLET | Refills: 1 | Status: SHIPPED | OUTPATIENT
Start: 2022-09-13 | End: 2023-03-15

## 2022-09-13 NOTE — TELEPHONE ENCOUNTER
metFORMIN (GLUCOPHAGE XR) 500 MG 24 hr tablet  Last Written Prescription Date:   3/7/2022  Last Fill Quantity: 360,   # refills: 1  Last Office Visit :  9/2/2022  Future Office visit:   12/2/2022    Routing refill request to provider for review/approval because:  Abnormal A1C  Refer to clinic for review     Recent Labs   Lab Test 02/15/22  0757   A1C 10.5*       Dahiana Birch RN  Central Triage Red Flags/Med Refills

## 2022-09-27 ENCOUNTER — OFFICE VISIT (OUTPATIENT)
Dept: INTERNAL MEDICINE | Facility: CLINIC | Age: 52
End: 2022-09-27
Payer: COMMERCIAL

## 2022-09-27 VITALS
TEMPERATURE: 97.5 F | WEIGHT: 146.1 LBS | OXYGEN SATURATION: 99 % | BODY MASS INDEX: 20.45 KG/M2 | SYSTOLIC BLOOD PRESSURE: 124 MMHG | HEART RATE: 83 BPM | HEIGHT: 71 IN | DIASTOLIC BLOOD PRESSURE: 78 MMHG

## 2022-09-27 DIAGNOSIS — K08.89 TOOTH PAIN: ICD-10-CM

## 2022-09-27 DIAGNOSIS — R19.7 VOMITING AND DIARRHEA: Primary | ICD-10-CM

## 2022-09-27 DIAGNOSIS — R11.10 VOMITING AND DIARRHEA: Primary | ICD-10-CM

## 2022-09-27 PROCEDURE — 99213 OFFICE O/P EST LOW 20 MIN: CPT | Mod: GE

## 2022-09-27 NOTE — PROGRESS NOTES
PRIMARY CARE CENTER         HPI:      Otto Hughes is a 52 year old male with PMH of HLD and T2DM presenting due to vomiting. PCP is Dr. John. Per pt, 9/25 is when he started feeling bad. Ate a lot of food that evening and started feeling poorly at approx 7AM the following morning on 9/26. Reports eating banana, merari, orange and baez. His other family members ate the baez and did not become sick. No one else in the house was sick. States the Baez was made with chicken and was fresh meat. Was vomiting between 7-8 AM. Vomited a total of 5 times over 1 hour. Felt very weak. He still feels weak today. Felt like he had breathing issues yesterday because he felt his fod got stuck and did not digest well. He clarified this and said his food felt like it would not digest well. No more vomiting today. Yesterday, he had liquid stool, but that has since resolved. Currently feels well today. States he has been drinking water and hydrating himself. Denies fatigue,  fevers, chills, CP, SOB, abd pain, N/V/D and BLE edema, melena, hematochezia.      Medications:  Current Outpatient Medications   Medication     blood glucose (NO BRAND SPECIFIED) test strip     blood glucose monitoring (NO BRAND SPECIFIED) meter device kit     blood glucose monitoring (SOFTCLIX) lancets     Blood Pressure Monitoring (BLOOD PRESSURE KIT) KIT     chlorhexidine (PERIDEX) 0.12 % solution     insulin glargine (LANTUS SOLOSTAR) 100 UNIT/ML pen     insulin pen needle (31G X 5 MM) 31G X 5 MM miscellaneous     insulin pen needle (32G X 4 MM) 32G X 4 MM miscellaneous     insulin pen needle 31G X 8 MM     lisinopril (ZESTRIL) 10 MG tablet     metFORMIN (GLUCOPHAGE XR) 500 MG 24 hr tablet     Multiple Vitamin (MULTIVITAMIN ADULT PO)     order for DME     Semaglutide (RYBELSUS) 3 MG TABS     Semaglutide (RYBELSUS) 7 MG TABS     sildenafil (VIAGRA) 100 MG tablet     simvastatin (ZOCOR) 20 MG tablet     No current facility-administered medications for  "this visit.        Allergies:   No Known Allergies    Medical History:  Past Medical History:   Diagnosis Date     Hyperlipidemia LDL goal < 100      T2DM (type 2 diabetes mellitus) (H)        Surgical History:  Past Surgical History:   Procedure Laterality Date     NO HISTORY OF SURGERY                Review of Systems:     10 point ROS negative unless otherwise specified in HPI  ROS  I have personally reviewed and updated the complete ROS on the day of the visit.           Physical Exam:   /78 (BP Location: Right arm, Patient Position: Sitting, Cuff Size: Adult Regular)   Pulse 83   Temp 97.5  F (36.4  C) (Oral)   Ht 1.803 m (5' 11\")   Wt 66.3 kg (146 lb 1.6 oz)   SpO2 99%   BMI 20.38 kg/m    Body mass index is 20.38 kg/m .  Vitals were reviewed    Physical Exam:   General: Sitting upright, talking in complete sentences, non-distressed  HEENT: Normocephalic, atraumatic, PERRL, EOMI, no conjunctival pallor, anicteric, nares patent, oropharynx clear and moist, Right molar with obvious decay.   CVS: S1/S2 WNL, RRR, no murmur, no LE edema  Pulm: Non-labored breathing, CTAB, no crackles or wheeze  Abdo: Non-distended, non-tender to palpation, no rebound or guarding, BS present, no masses or HSM   MSK: No obvious bony deformities, no clubbing  Skin: Warm and dry, no obvious rashes  Neuro: Alert and oriented x3, non-focal   Psych: Normal mood and affect       Assessment and Plan   Otto Hughes is a 52 year old male with PMH of HLD and T2DM presenting due to vomiting. PCP is Dr. John.       Vomiting and diarrhea  Acute onset vomiting and diarrhea which began on 9/26. Now resolved. Pt is  Feeling better and denies abdominal pain, N/V/D. Physical exam unremarkable. Suspect sxs due to gastroenteritis 2/2 contaminated food. While his other family members were not sick, possible he consumed a contaminated portion. Given benign abdominal exam and resolution of sxs, will not pursue further workup at this time.  - " Recommended pt hydrate. May try Gatorade Zero since he has T2DM  - RTC if sxs return, otherwise follow up with Dr Carrera as previously directed      Tooth pain  Reporting tooth pain for past 2-3 weeks. Has also been having a HA since that time. Pt states Dr Carrera gave him a dental referral but he lost it.   - Recommended Ibuprofen or Naproxen for HA as Tylenol is not providing much relief for him  - Printed Dental referral for him         Options for treatment and follow-up care were reviewed with the patient. Otto Hughes engaged in the decision making process and verbalized understanding of the options discussed and agreed with the final plan.        Pt was seen and plan of care discussed with Dr Marcus Pimentel.    Dayana Terrazas MD  Internal Medicine-PGY2  [facility]  Pager: [phone]  Sep 27, 2022     While the patient was in clinic, I reviewed the pertinent medical history and results.  I discussed the current findings on physical examination, as well as the patient s diagnosis and treatment plan with the resident and agree with the information as documented with the following exceptions: none.  Marcus Pimentel MD

## 2022-09-27 NOTE — LETTER
September 27, 2022    To whom it may concern:     Otto Hughes is given a work excuse note for 9/26/22 - 9/27/22. He was unable to attend work due to a medical illness. Please contact the medical clinic if there are other questions at [phone].     Thank You,           Dayana Terrazas MD

## 2022-09-27 NOTE — NURSING NOTE
Otto Hughes is a 52 year old male that presents in clinic today for the following:     Chief Complaint   Patient presents with     Gastrointestinal Problem     Felt like food was not going down before going to working yesterday; loose stools     Vomiting     Patient threw up a few times at work     Fatigue     Weakness       The patient's allergies and medications were reviewed. The patient's vitals were obtained without incident. The patient does not have any other questions for the provider.     Antelmo Roblero, EMT at 8:13 AM on 9/27/2022.  Primary Care Clinic: [phone]

## 2022-10-11 ENCOUNTER — TELEPHONE (OUTPATIENT)
Dept: GASTROENTEROLOGY | Facility: CLINIC | Age: 52
End: 2022-10-11

## 2022-10-11 NOTE — TELEPHONE ENCOUNTER
Screening Questions    BlueKIND OF PREP RedLOCATION [review exclusion criteria] GreenSEDATION TYPE      NHave you had a positive covid test in the last 90 days?   If yes, what date?        Y Are you able to give consent for your medical care?  (Sedation review/consideration needed)      N Are you active on mychart?       PO What insurance is in the chart?        MEAGHAN RUELAS Ordering/Referring Provider:        20.4 BMI [BMI OVER 40-EXTENDED PREP]  BMI OVER 40 NEED PAC EVALUATION FOR UPU     Respiratory Screening:  [If yes to any of the following HOSPITAL setting only]     N      Do you use daily home oxygen?   N      Do you have mod to severe Obstructive Sleep Apnea? Hospital only - Ok at [facility]   N      Do you have Pulmonary Hypertension? NEED PAC APPT AT [facility]     N      Do you have UNCONTROLLED asthma?         N Have you had a heart or lung transplant?          N Are you currently on dialysis? [If yes, G-PREP & HOSPITAL setting only]        N  Do you have chronic kidney disease? [If yes, G-PREP]       Y Do you have a diagnosis of diabetes?[If yes, G-PREP]       N Have you had a stroke or Transient ischemic attack (TIA - aka  mini stroke ) within 6 months? (If yes, please review exclusion criteria)         N  In the past 6 months, have you had any heart related issues including cardiomyopathy or heart attack?          N  If yes, did it require cardiac stenting or other implantable device?          N Do you have any implantable devices in your body (pacemaker, defib, LVAD)? (If yes, please review exclusion criteria)       N Do you take nitroglycerin?          N If yes, how often?  (If yes, HOSPITAL setting ONLY)       N Are you currently taking any blood thinners?           [IF YES, INFORM PATIENT TO FOLLOW UP W/ ORDERING PROVIDER FOR BRIDGING INSTRUCTIONS]        N  Do you take Phentermine?      Yes-> Hold for 7 days before procedure.  Please consult your prescribing provider if you have questions  about holding this medication.       N Are you taking any prescription pain medications on a routine schedule? [EXTENDED PREP AND MAC]            [FEMALES] are you currently pregnant?            If yes, how many weeks? [Greater than 12 weeks, OR NEEDED]       N Any chemical dependencies such as alcohol, street drugs, or methadone? [If yes, MAC]       N Any history of post-traumatic stress syndrome, severe anxiety or history of psychosis? [If yes, MAC]       Y Can you transfer from bed to chair? (If NO, please HOSPITAL setting  only)        N  On a regular basis do you go 3-5 days between bowel movements?[ If yes, EXTENDED PREP.]        Preferred LOCAL Pharmacy for Pre Prescription:       [facility] [place], [place] - [address] [place] [address]                            Scheduling Details       Caller:   (Please ask for phone number if not scheduled by patient)    Type of Procedure Scheduled: Lower Endoscopy [Colonoscopy]    GPREP Which Colonoscopy Prep was Sent:   JAIR CF PATIENTS & GROEN'S PATIENTS NEEDS EXTENDED PREP    Date of Procedure: 12/9  Surgeon:   Location: [facility]  Sedation Type: CS    Conscious Sedation- Needs  for 6 hours after the procedure  MAC/General-Needs  for 24 hours after procedure    N Pre-op Required at [facility] [place], [place] and OR for MAC sedation:        (Advise patient they will need a pre-op WITH IN 30 DAYS prior to procedure -)      Y Informed patient they will need an adult          Cannot take any type of public or medical transportation alone    Y Confirmed Nurse will call to complete assessment     12/5 [facility] Pre-Procedure Covid test to be completed [[facility] PCR Testing Required]       Additional comments:

## 2022-11-21 ENCOUNTER — OFFICE VISIT (OUTPATIENT)
Dept: OPHTHALMOLOGY | Facility: CLINIC | Age: 52
End: 2022-11-21
Attending: STUDENT IN AN ORGANIZED HEALTH CARE EDUCATION/TRAINING PROGRAM
Payer: COMMERCIAL

## 2022-11-21 DIAGNOSIS — H35.3130 BILATERAL NONEXUDATIVE AGE-RELATED MACULAR DEGENERATION, UNSPECIFIED STAGE: ICD-10-CM

## 2022-11-21 DIAGNOSIS — H25.13 NUCLEAR SCLEROTIC CATARACT OF BOTH EYES: ICD-10-CM

## 2022-11-21 DIAGNOSIS — H52.4 PRESBYOPIA: ICD-10-CM

## 2022-11-21 DIAGNOSIS — E11.65 TYPE 2 DIABETES MELLITUS WITH HYPERGLYCEMIA, UNSPECIFIED WHETHER LONG TERM INSULIN USE (H): Primary | ICD-10-CM

## 2022-11-21 DIAGNOSIS — H04.123 DRY EYES, BILATERAL: ICD-10-CM

## 2022-11-21 PROCEDURE — G0463 HOSPITAL OUTPT CLINIC VISIT: HCPCS | Mod: 25

## 2022-11-21 PROCEDURE — 92015 DETERMINE REFRACTIVE STATE: CPT

## 2022-11-21 PROCEDURE — 92004 COMPRE OPH EXAM NEW PT 1/>: CPT | Mod: GC | Performed by: STUDENT IN AN ORGANIZED HEALTH CARE EDUCATION/TRAINING PROGRAM

## 2022-11-21 ASSESSMENT — REFRACTION_MANIFEST
OD_CYLINDER: +0.50
OS_AXIS: 085
OS_ADD: +2.50
OS_SPHERE: PLANO
OS_CYLINDER: +0.50
OD_AXIS: 145
OD_SPHERE: PLANO
OD_ADD: +2.50

## 2022-11-21 ASSESSMENT — CONF VISUAL FIELD
OS_INFERIOR_TEMPORAL_RESTRICTION: 0
OS_SUPERIOR_NASAL_RESTRICTION: 0
OS_INFERIOR_NASAL_RESTRICTION: 0
OD_INFERIOR_TEMPORAL_RESTRICTION: 0
OD_INFERIOR_NASAL_RESTRICTION: 0
OS_NORMAL: 1
METHOD: COUNTING FINGERS
OD_NORMAL: 1
OS_SUPERIOR_TEMPORAL_RESTRICTION: 0
OD_SUPERIOR_NASAL_RESTRICTION: 0
OD_SUPERIOR_TEMPORAL_RESTRICTION: 0

## 2022-11-21 ASSESSMENT — TONOMETRY
OS_IOP_MMHG: 15
IOP_METHOD: TONOPEN
OD_IOP_MMHG: 15

## 2022-11-21 ASSESSMENT — CUP TO DISC RATIO
OS_RATIO: 0.45
OD_RATIO: 0.4

## 2022-11-21 ASSESSMENT — EXTERNAL EXAM - RIGHT EYE: OD_EXAM: NORMAL

## 2022-11-21 ASSESSMENT — VISUAL ACUITY
OS_SC: 20/20
METHOD: SNELLEN - LINEAR
OD_SC: 20/20
OD_SC+: -2
OS_SC+: -3

## 2022-11-21 ASSESSMENT — EXTERNAL EXAM - LEFT EYE: OS_EXAM: NORMAL

## 2022-11-21 ASSESSMENT — SLIT LAMP EXAM - LIDS
COMMENTS: MGD
COMMENTS: MGD

## 2022-11-21 NOTE — NURSING NOTE
Chief Complaints and History of Present Illnesses   Patient presents with     Eye Exam For Diabetes     Chief Complaint(s) and History of Present Illness(es)     Eye Exam For Diabetes            Laterality: both eyes    Associated symptoms: tearing.  Negative for eye pain, flashes and floaters    Treatments tried: no treatments          Comments    Here for annual diabetic exam. Since last eye exam, near vision has gradually decreasing in which he had to increase his readers from 1.25 to 1.75. Distance vision is doing fine. He notes some tearing. No flashes or floaters. No pain.    LBS: over 200  Lab Results       Component                Value               Date                       A1C                      10.5                02/15/2022                 A1C                      12.7                11/30/2020                 A1C                      13.4                02/04/2020                 A1C                      11.0                09/17/2018                 A1C                      11.2                11/14/2017                 A1C                      10.2                01/31/2017              Sandor Joseph COT 8:26 AM November 21, 2022

## 2022-11-21 NOTE — PATIENT INSTRUCTIONS
Today I want you to start:  -A vitamin for your eyes (AREDS, Preservision, Occuvite - any of these are good)  -Warm compresses twice a day  -Artificial tears 4 times per day (see below for approved brands)    Artificial Tears 4x/day:  Systane  Refresh  Tears Naturale  Genteal  Optive  Soothe  Hypotears    All of these are good products.    DO NOT USE VISINE OR CLEAR EYES

## 2022-11-21 NOTE — PROGRESS NOTES
HPI     Eye Exam For Diabetes    In both eyes.  Associated symptoms include tearing.  Negative for eye pain, flashes and floaters.  Treatments tried include no treatments.           Comments    Here for annual diabetic exam. Since last eye exam, near vision has gradually decreasing in which he had to increase his readers from 1.25 to 1.75. Distance vision is doing fine. He notes some tearing. No flashes or floaters. No pain.    LBS: over 200  Lab Results       Component                Value               Date                       A1C                      10.5                02/15/2022                 A1C                      12.7                11/30/2020                 A1C                      13.4                02/04/2020                 A1C                      11.0                09/17/2018                 A1C                      11.2                11/14/2017                 A1C                      10.2                01/31/2017              Sandor Joseph COT 8:26 AM November 21, 2022             Last edited by Sandor Joseph on 11/21/2022  8:26 AM.          Review of systems for the eyes was negative other than the pertinent positives/negatives listed in the HPI.    Ocular Meds: None    Ocular Hx: None    FOHx: no family history of glaucoma or blindness    PMHx: Diabetes, HTN    Assessment & Plan      Otto Hughes is a 52 year old male with the following diagnoses:    1. Type 2 diabetes mellitus with hyperglycemia, unspecified whether long term insulin use (H)    2. Bilateral nonexudative age-related macular degeneration, unspecified stage    3. Dry eyes, bilateral    4. Nuclear sclerotic cataract of both eyes    5. Presbyopia      T2DM without Retinopathy OU  - Diagnosed: 2011  - strict BP/BG control  - patient understands importance  of good blood glucose control in order to reduce the risk of developing diabetic retinopathy  - yearly DFE     Age-Related Macular Degeneration OU  - drusen appearance, but young  age  - AREDS2  - Amsler precautions  -OCT mac at next visit    Nuclear Sclerotic Cataracts OU  - not visually significant  - Observe    Dry Eyes OU  - AT's 4x/day OU  - Consider AT Ointment at bedtime OU if no improvement  - Warm compresses twice a day    Presbyopia OU  - Uses +1.75 at home and reads well  - Continue with OTC readers    Patient disposition:   Return in about 1 year (around 11/21/2023) for Annual Visit, OCT Macula, or sooner changes.       Carmita Meadows MD, PGY-2    Attending Physician Attestation:  Complete documentation of historical and exam elements from today's encounter can be found in the full encounter summary report (not reduplicated in this progress note).  I personally obtained the chief complaint(s) and history of present illness.  I confirmed and edited as necessary the review of systems, past medical/surgical history, family history, social history, and examination findings as documented by others; and I examined the patient myself.  I personally reviewed the relevant tests, images, and reports as documented above.  I formulated and edited as necessary the assessment and plan and discussed the findings and management plan with the patient and family. . - Dariana Scales MD

## 2022-11-30 DIAGNOSIS — E11.9 TYPE 2 DIABETES MELLITUS WITHOUT COMPLICATION, UNSPECIFIED WHETHER LONG TERM INSULIN USE (H): ICD-10-CM

## 2022-12-02 ENCOUNTER — TELEPHONE (OUTPATIENT)
Dept: GASTROENTEROLOGY | Facility: CLINIC | Age: 52
End: 2022-12-02

## 2022-12-02 DIAGNOSIS — Z12.11 ENCOUNTER FOR SCREENING COLONOSCOPY: Primary | ICD-10-CM

## 2022-12-02 RX ORDER — ORAL SEMAGLUTIDE 3 MG/1
TABLET ORAL
Qty: 30 TABLET | Refills: 5 | Status: SHIPPED | OUTPATIENT
Start: 2022-12-02 | End: 2023-07-20

## 2022-12-02 RX ORDER — BISACODYL 5 MG
TABLET, DELAYED RELEASE (ENTERIC COATED) ORAL
Qty: 4 TABLET | Refills: 0 | Status: SHIPPED | OUTPATIENT
Start: 2022-12-02

## 2022-12-02 NOTE — LETTER
December 5, 2022      Otto Hughes  [address] [facility] [address]              Dear Otto,      Colonoscopy  Your exam is on 12.9.2022    Please note that your procedure time may change  Check in at: [facility]; [address], 5th [address], [place] [address]    Please arrive with an adult who can drive you home after the exam and stay with you for the next 24 hours, unless your provider says otherwise.   The medicines used in the exam will make you sleepy. You will not be able to drive. You cannot take a medical cab, taxi, Uber, train or bus by yourself.    For questions or appointments, call:  [facility]: [phone], option 4  Monday through Friday, 8 a.m. to 4:30 p.m.  (If it is after hours please reach out to the clinic or provider you were scheduled with.)    ----------------------------------------------------------------------------------------------------------------------      STANDARD Golytely (Colyte, Nulytely)  Prep Instructions for your Colonoscopy      Please read these instructions carefully at least 7 days prior to your colonoscopy procedure. Be sure to follow all directions completely. The inside of your colon must be clean to allow for a complete examination for the presence of any growths, polyps, and/or abnormalities, as well as their biopsy or removal. A number of tips are included in order to make this part of the procedure as comfortable as possible.    Getting ready:     A nurse will call you within a week of your exam to prescribe your bowel prep, review the prep instructions, and answer any other questions you have.     You must arrange for an adult to drive you home after your exam. Your colonoscopy cannot be done unless you have a ride. If you need to use public transportation, someone must ride with you and stay with you for a minimum of 6-24 hours.    Check with your insurance company to be sure they will cover this exam.    7  days before the exam:    Talk to your doctor:  If you take blood-thinners (such as Coumadin, Plavix, Xarelto), your prescription or schedule may need to change before the test.    Stop taking fiber supplements, multi-vitamins with iron, and medicines that contain iron.    Continue taking prescribed aspirin; talk to your prescribing doctor with any concerns.    Stop eating corn, nuts and foods with seeds.  These can stay in the colon for days.    If you have diabetes:  Ask to have your exam early in the morning.  Also, ask your doctor if you should change your diet or medicines.    3 days before the exam:    Begin a low-fiber diet: No raw fruits or vegetables, whole wheat, seeds, nuts, popcorn or other high-fiber foods (see list on page). No binding agents: (bran, Metamucil, Fibercon) and no Olestra (a fat substitute).    One day before the exam:    You can have a light, low-fiber breakfast. But drink only clear liquids after 9 a.m. (see list below). Drink at least 8 to 10 full glasses of clear liquids during the day.     Fill the jug that contains the Golytely powder with warm water. Cover and shake until well mixed. Use a full gallon of water. Chill for 3 hours, but do not add ice.    You will start drinking half of the Golytely solution at 6 p.m. The timing of drinking the 2nd half of the Golytely solution depends on your exam time. See Step 2 below.    After you start drinking the solution, stay near a toilet. You may have watery stools (diarrhea), mild cramping, bloating , and nausea.     Step One:  o At 3 p.m., take 2 tablets of Dulcolax (bisacodyl).  o At 6 p.m. start drinking the Golytely solution. Drink an 8-ounce glass every 15 minutes until the jug is half empty. Drink each glass quickly.     Step Two:   If you arrive before 11 AM:  At 11 p.m. on the day before your exam:    Take 2 Dulcolax (Bisacodyl) tablets.     Start drinking the other half of the Golytely jug. Drink one 8-ounce glass every 15 minutes  until the jug is empty. Drink each glass quickly.  If you arrive after 11 AM:  At 6 AM on the day of the exam:    Take 2 tablets of Dulcolax (Bisacodyl).     Drink the other half of the Golytely jug.     Drink one 8-ounce glass every 15 minutes until the jug is empty.     Drink each glass quickly.     You should finish the prep 4 hours before the exam.      Day of exam:      You may drink clear liquids only up until 2 hours before your arrival time.    Do not drink anything 2 hours before your arrival, not even water. (If you must take medicine, you can take it with a sip of water.)     Do not chew or swallow anything including water or gum for at least 2 hours before your exam. If you do, we may cancel the exam for your safety.     Do not take diabetes medicine by mouth until after your exam.    If you have asthma, bring your inhaler.    Arrive with an adult who will take you home after your test. The medicine used will make you sleepy. If you don't have someone to take you home, we will cancel your test.       CLEAR LIQUIDS   You may have:    Water, tea, coffee (no milk or cream)    Soda pop, Gatorade (not red or purple)    Jell-O, Popsicles (no milk or fruit pieces - not red or purple)    Fat-free soup broth or bouillon    Plain hard candy, such as clear life savers (not red or purple)    Clear juices and fruit-flavored drinks, such as apple juice, white grape juice, Hi-C, and Brayden-Aid (not red or purple)   Do not have:    Milk or milk products such as ice cream, malts or shakes, or coffee creamer    Red or purple drinks of any kind such as cranberry juice or grape juice. Avoid red or purple Jell-O, Popsicles, Brayden-Aid, sorbet, sherbet and candy    Juices with pulp such as orange, grapefruit, pineapple or tomato juice    Cream soups of any kind    Alcohol and beer    Protein drinks or protein powder     LOW FIBER DIET   You may have:      Starches: White bread, rolls, biscuits, croissants, [place] toast, white  flour tortillas, waffles, pancakes, Kittitian toast; white rice, noodles, pasta, macaroni; cooked and peeled potatoes; plain crackers, saltines; cooked farina or cream of rice; puffed rice, corn flakes, Rice Krispies, Special K     Vegetables: tender cooked and canned, vegetable broths    Fruits and fruit juices: Strained fruit juice, canned fruit without seeds or skin (not pineapple), applesauce, pear sauce, ripe bananas, melons (not watermelon)     Milk products: Milk (plain or flavored), cheese, cottage cheese, yogurt (no berries), custard, ice cream      Proteins: Tender, well-cooked ground beef, lamb, veal, ham, pork, chicken, turkey, fish or organ meats; eggs; creamy peanut butter     Fats and condiments:  Margarine, butter, oils, mayonnaise, sour cream, salad dressing, plain gravy; spices, cooked herbs; sugar, clear jelly, honey, syrup     Snacks, sweets and drinks: Pretzels, hard candy; plain cakes and cookies (no nuts or seeds); gelatin, plain pudding, sherbet, Popsicles; coffee, tea, carbonated ( fizzy ) drinks Do not have:      Starches: Breads or rolls that contain nuts, seeds or fruit; whole wheat or whole grain breads that contain more than 1 gram of fiber per slice; cornbread; corn or whole wheat tortillas; potatoes with skin; brown rice, wild rice, kasha (buckwheat), and oatmeal     Vegetables: Any raw or steamed vegetables; vegetables with seeds; corn in any form     Fruits and fruit juices: Prunes, prune juice, raisins and other dried fruits, berries and other fruits with seeds, canned pineapple juices with pulp such as orange, grapefruit, pineapple or tomato juice    Milk products: Any yogurt with nuts, seeds or berries     Proteins: Tough, fibrous meats with gristle; cooked dried beans, peas or lentils; crunchy peanut butter    Fats and condiments: Pickles, olives, relish, horseradish; jam, marmalade, preserves     Snacks, sweets and drinks: Popcorn, nuts, seeds, granola, coconut, candies made with  nuts or seeds; all desserts that contain nuts, seeds, raisins and other dried fruits, coconut, whole grains or bran.        FAQ:      How do you know if your colon is cleaned out?   o After completing the bowel prep, your bowel movements should be all liquid and yellow. Your bowel movements will look similar to urine in the toilet. If there are pieces of stool (poop) in the toilet, or if you can't see to the bottom of the toilet, please call our office for advice. Call [phone] and ask to speak with a nurse.     Why do you need a responsible  to take you home and stay with you?  o We require a responsible adult to take you home for your safety. The sedation medicines used to relax you during the procedure can impair your judgement and reaction time, make you forgetful and possible a little unsteady. Do not drive, make any important decisions, or sign any legal documents for 24 hours after your procedure.     It is normal to feel bloated and gassy after your procedure. Walking will help move the air through your colon. You can take non-aspirin pain relievers that contain acetaminophen (Tylenol).     When can you eat after your procedure?  o You may resume your normal diet when you feel ready, unless advised otherwise by the doctor performing your procedure. Do not drink alcohol for 24 hours after your procedure.     You many resume normal activities (work, exercise, etc.) after 24 hours.     When will you get test results?  o You should have your procedure results and any lab results (if applicable) by letter, MyChart message, or phone call within 2 weeks. If you have any questions, please call the doctor that referred you for the procedure.       Thank you for choosing  [facility] for your procedure. If you are sent a survey regarding your care, please take the time to complete the questionnaire. We value your feedback!             Updated: 6/22/2022

## 2022-12-02 NOTE — TELEPHONE ENCOUNTER
semaglutide      Last Written Prescription Date:  04/13/22  Last Fill Quantity: 30,   # refills: 5  Last Office Visit : 09/02/22  Future Office visit:  12/13/22    Routing refill request to provider for review/approval because:   GLP-1 Agonists Protocol Failed      HgbA1C in past 3 or 6 months

## 2022-12-02 NOTE — TELEPHONE ENCOUNTER
Attempted to contact patient regarding upcoming colonoscopy  procedure on 12/9/22 for pre assessment questions. Wife answered and stated pt is at work.    Left message with wife to please return call to [phone] #[phone]    Discuss Covid policy. Pt has Covid test scheduled for 12/5/22 - please inform him this can be cancelled unless he has sx or exposure.    Pre op exam scheduled: N/A    Arrival time: 1330    [facility]; [address] [place] [address], 5th Floor, [place] [address]    Sedation type: Conscious sedation     Anticoagulants: No    Electronic implanted devices? No    Diabetic? Yes - Patient to hold oral diabetic medications day of procedure    Indication for procedure: Screening    Bowel prep recommendation: Standard Golytely     Prep instructions previously sent via letter. Bowel prep script sent to    [facility] [place], [place] - [address]   (Please confirm this is correct pharmacy as it appears pt lives in [place], [place])    ROCÍO PRECIADO RN

## 2022-12-05 NOTE — TELEPHONE ENCOUNTER
RN spent 28 minutes with patient for pre-assessment.    Pre assessment questions completed for upcoming colonoscopy  procedure scheduled on 12.9.2022    COVID policy reviewed.     Reviewed procedural arrival time 1330 and facility location [facility]; [address] [place] [address], 5th Floor, [place] [address]    Designated  policy reviewed. Instructed to have someone stay 6 hours post procedure.     Anticoagulation/blood thinners? No    Electronic implanted devices? No    Diabetic? Yes - Patient to hold oral diabetic medications day of procedure    Reviewed procedure prep instructions.     Pt requests [employer] prep instructions communication via unencrypted e-mail. Pt acknowledges that they have agreed to accept the risks and receive unencrypted communications for this incidence. [email or web address]  Prep instructions sent via Letter.     Patient verbalized understanding and had no questions or concerns at this time.    Quiana Heredia RN

## 2022-12-08 ENCOUNTER — ANESTHESIA EVENT (OUTPATIENT)
Dept: SURGERY | Facility: AMBULATORY SURGERY CENTER | Age: 52
End: 2022-12-08
Payer: COMMERCIAL

## 2022-12-09 ENCOUNTER — ANESTHESIA (OUTPATIENT)
Dept: SURGERY | Facility: AMBULATORY SURGERY CENTER | Age: 52
End: 2022-12-09
Payer: COMMERCIAL

## 2022-12-09 ENCOUNTER — HOSPITAL ENCOUNTER (OUTPATIENT)
Facility: AMBULATORY SURGERY CENTER | Age: 52
Discharge: HOME | End: 2022-12-09
Attending: INTERNAL MEDICINE
Payer: COMMERCIAL

## 2022-12-09 VITALS
SYSTOLIC BLOOD PRESSURE: 93 MMHG | DIASTOLIC BLOOD PRESSURE: 53 MMHG | RESPIRATION RATE: 12 BRPM | OXYGEN SATURATION: 99 % | HEART RATE: 76 BPM

## 2022-12-09 VITALS — HEART RATE: 87 BPM

## 2022-12-09 LAB
COLONOSCOPY: NORMAL
GLUCOSE BLDC GLUCOMTR-MCNC: 220 MG/DL (ref 70–99)

## 2022-12-09 PROCEDURE — 82962 GLUCOSE BLOOD TEST: CPT | Performed by: PATHOLOGY

## 2022-12-09 PROCEDURE — 45378 DIAGNOSTIC COLONOSCOPY: CPT | Mod: 33

## 2022-12-09 RX ORDER — PROCHLORPERAZINE MALEATE 10 MG
10 TABLET ORAL EVERY 6 HOURS PRN
Status: DISCONTINUED | OUTPATIENT
Start: 2022-12-09 | End: 2022-12-10 | Stop reason: HOSPADM

## 2022-12-09 RX ORDER — LIDOCAINE HYDROCHLORIDE 20 MG/ML
INJECTION, SOLUTION INFILTRATION; PERINEURAL PRN
Status: DISCONTINUED | OUTPATIENT
Start: 2022-12-09 | End: 2022-12-09

## 2022-12-09 RX ORDER — PROPOFOL 10 MG/ML
INJECTION, EMULSION INTRAVENOUS CONTINUOUS PRN
Status: DISCONTINUED | OUTPATIENT
Start: 2022-12-09 | End: 2022-12-09

## 2022-12-09 RX ORDER — NALOXONE HYDROCHLORIDE 0.4 MG/ML
0.4 INJECTION, SOLUTION INTRAMUSCULAR; INTRAVENOUS; SUBCUTANEOUS
Status: DISCONTINUED | OUTPATIENT
Start: 2022-12-09 | End: 2022-12-10 | Stop reason: HOSPADM

## 2022-12-09 RX ORDER — LABETALOL HYDROCHLORIDE 5 MG/ML
10 INJECTION, SOLUTION INTRAVENOUS
Status: DISCONTINUED | OUTPATIENT
Start: 2022-12-09 | End: 2022-12-09 | Stop reason: HOSPADM

## 2022-12-09 RX ORDER — NALOXONE HYDROCHLORIDE 0.4 MG/ML
0.2 INJECTION, SOLUTION INTRAMUSCULAR; INTRAVENOUS; SUBCUTANEOUS
Status: DISCONTINUED | OUTPATIENT
Start: 2022-12-09 | End: 2022-12-10 | Stop reason: HOSPADM

## 2022-12-09 RX ORDER — FENTANYL CITRATE 50 UG/ML
50 INJECTION, SOLUTION INTRAMUSCULAR; INTRAVENOUS EVERY 5 MIN PRN
Status: DISCONTINUED | OUTPATIENT
Start: 2022-12-09 | End: 2022-12-09 | Stop reason: HOSPADM

## 2022-12-09 RX ORDER — ONDANSETRON 2 MG/ML
4 INJECTION INTRAMUSCULAR; INTRAVENOUS EVERY 6 HOURS PRN
Status: DISCONTINUED | OUTPATIENT
Start: 2022-12-09 | End: 2022-12-10 | Stop reason: HOSPADM

## 2022-12-09 RX ORDER — FENTANYL CITRATE 50 UG/ML
25 INJECTION, SOLUTION INTRAMUSCULAR; INTRAVENOUS
Status: DISCONTINUED | OUTPATIENT
Start: 2022-12-09 | End: 2022-12-10 | Stop reason: HOSPADM

## 2022-12-09 RX ORDER — OXYCODONE HYDROCHLORIDE 5 MG/1
5 TABLET ORAL EVERY 4 HOURS PRN
Status: DISCONTINUED | OUTPATIENT
Start: 2022-12-09 | End: 2022-12-10 | Stop reason: HOSPADM

## 2022-12-09 RX ORDER — HALOPERIDOL 5 MG/ML
1 INJECTION INTRAMUSCULAR
Status: DISCONTINUED | OUTPATIENT
Start: 2022-12-09 | End: 2022-12-10 | Stop reason: HOSPADM

## 2022-12-09 RX ORDER — HYDRALAZINE HYDROCHLORIDE 20 MG/ML
2.5-5 INJECTION INTRAMUSCULAR; INTRAVENOUS EVERY 10 MIN PRN
Status: DISCONTINUED | OUTPATIENT
Start: 2022-12-09 | End: 2022-12-09 | Stop reason: HOSPADM

## 2022-12-09 RX ORDER — SODIUM CHLORIDE, SODIUM LACTATE, POTASSIUM CHLORIDE, CALCIUM CHLORIDE 600; 310; 30; 20 MG/100ML; MG/100ML; MG/100ML; MG/100ML
INJECTION, SOLUTION INTRAVENOUS CONTINUOUS
Status: DISCONTINUED | OUTPATIENT
Start: 2022-12-09 | End: 2022-12-10 | Stop reason: HOSPADM

## 2022-12-09 RX ORDER — ALBUTEROL SULFATE 0.83 MG/ML
2.5 SOLUTION RESPIRATORY (INHALATION) EVERY 4 HOURS PRN
Status: DISCONTINUED | OUTPATIENT
Start: 2022-12-09 | End: 2022-12-09 | Stop reason: HOSPADM

## 2022-12-09 RX ORDER — HYDROMORPHONE HYDROCHLORIDE 1 MG/ML
0.4 INJECTION, SOLUTION INTRAMUSCULAR; INTRAVENOUS; SUBCUTANEOUS EVERY 5 MIN PRN
Status: DISCONTINUED | OUTPATIENT
Start: 2022-12-09 | End: 2022-12-09 | Stop reason: HOSPADM

## 2022-12-09 RX ORDER — ONDANSETRON 2 MG/ML
4 INJECTION INTRAMUSCULAR; INTRAVENOUS EVERY 30 MIN PRN
Status: DISCONTINUED | OUTPATIENT
Start: 2022-12-09 | End: 2022-12-10 | Stop reason: HOSPADM

## 2022-12-09 RX ORDER — FENTANYL CITRATE 50 UG/ML
25 INJECTION, SOLUTION INTRAMUSCULAR; INTRAVENOUS EVERY 5 MIN PRN
Status: DISCONTINUED | OUTPATIENT
Start: 2022-12-09 | End: 2022-12-09 | Stop reason: HOSPADM

## 2022-12-09 RX ORDER — LIDOCAINE 40 MG/G
CREAM TOPICAL
Status: DISCONTINUED | OUTPATIENT
Start: 2022-12-09 | End: 2022-12-09 | Stop reason: HOSPADM

## 2022-12-09 RX ORDER — HYDROMORPHONE HYDROCHLORIDE 1 MG/ML
0.2 INJECTION, SOLUTION INTRAMUSCULAR; INTRAVENOUS; SUBCUTANEOUS EVERY 5 MIN PRN
Status: DISCONTINUED | OUTPATIENT
Start: 2022-12-09 | End: 2022-12-09 | Stop reason: HOSPADM

## 2022-12-09 RX ORDER — PROPOFOL 10 MG/ML
INJECTION, EMULSION INTRAVENOUS PRN
Status: DISCONTINUED | OUTPATIENT
Start: 2022-12-09 | End: 2022-12-09

## 2022-12-09 RX ORDER — SODIUM CHLORIDE, SODIUM LACTATE, POTASSIUM CHLORIDE, CALCIUM CHLORIDE 600; 310; 30; 20 MG/100ML; MG/100ML; MG/100ML; MG/100ML
INJECTION, SOLUTION INTRAVENOUS CONTINUOUS
Status: DISCONTINUED | OUTPATIENT
Start: 2022-12-09 | End: 2022-12-09 | Stop reason: HOSPADM

## 2022-12-09 RX ORDER — ONDANSETRON 4 MG/1
4 TABLET, ORALLY DISINTEGRATING ORAL EVERY 30 MIN PRN
Status: DISCONTINUED | OUTPATIENT
Start: 2022-12-09 | End: 2022-12-10 | Stop reason: HOSPADM

## 2022-12-09 RX ORDER — FLUMAZENIL 0.1 MG/ML
0.2 INJECTION, SOLUTION INTRAVENOUS
Status: DISCONTINUED | OUTPATIENT
Start: 2022-12-09 | End: 2022-12-10 | Stop reason: HOSPADM

## 2022-12-09 RX ORDER — ONDANSETRON 4 MG/1
4 TABLET, ORALLY DISINTEGRATING ORAL EVERY 6 HOURS PRN
Status: DISCONTINUED | OUTPATIENT
Start: 2022-12-09 | End: 2022-12-10 | Stop reason: HOSPADM

## 2022-12-09 RX ORDER — ONDANSETRON 2 MG/ML
4 INJECTION INTRAMUSCULAR; INTRAVENOUS
Status: DISCONTINUED | OUTPATIENT
Start: 2022-12-09 | End: 2022-12-09 | Stop reason: HOSPADM

## 2022-12-09 RX ADMIN — SODIUM CHLORIDE, SODIUM LACTATE, POTASSIUM CHLORIDE, CALCIUM CHLORIDE: 600; 310; 30; 20 INJECTION, SOLUTION INTRAVENOUS at 12:42

## 2022-12-09 RX ADMIN — LIDOCAINE HYDROCHLORIDE 40 MG: 20 INJECTION, SOLUTION INFILTRATION; PERINEURAL at 13:02

## 2022-12-09 RX ADMIN — PROPOFOL 150 MCG/KG/MIN: 10 INJECTION, EMULSION INTRAVENOUS at 13:02

## 2022-12-09 RX ADMIN — PROPOFOL 100 MG: 10 INJECTION, EMULSION INTRAVENOUS at 13:02

## 2022-12-09 NOTE — ANESTHESIA PREPROCEDURE EVALUATION
Anesthesia Pre-Procedure Evaluation    Patient: Otto Hughes   MRN: [id number] : 1970        Procedure : Procedure(s):  COLONOSCOPY          Past Medical History:   Diagnosis Date     Hyperlipidemia LDL goal < 100      T2DM (type 2 diabetes mellitus) (H)       Past Surgical History:   Procedure Laterality Date     NO HISTORY OF SURGERY        No Known Allergies   Social History     Tobacco Use     Smoking status: Former     Packs/day: 0.50     Years: 17.00     Pack years: 8.50     Types: Cigarettes     Quit date: 2008     Years since quittin.9     Smokeless tobacco: Never   Substance Use Topics     Alcohol use: No      Wt Readings from Last 1 Encounters:   22 66.3 kg (146 lb 1.6 oz)        Anesthesia Evaluation            ROS/MED HX  ENT/Pulmonary:  - neg pulmonary ROS     Neurologic:  - neg neurologic ROS     Cardiovascular:     (+) Dyslipidemia -----    METS/Exercise Tolerance:     Hematologic:       Musculoskeletal:       GI/Hepatic:  - neg GI/hepatic ROS   (+) bowel prep,     Renal/Genitourinary:  - neg Renal ROS     Endo:     (+) type II DM, Using insulin, - not using insulin pump.     Psychiatric/Substance Use:  - neg psychiatric ROS     Infectious Disease:       Malignancy:       Other:               OUTSIDE LABS:  CBC:   Lab Results   Component Value Date    WBC 5.8 2022    WBC 7.7 2020    HGB 16.7 2022    HGB 15.4 2020    HCT 49.3 2022    HCT 48.8 2020     2022     2020     BMP:   Lab Results   Component Value Date     2022     02/15/2022    POTASSIUM 4.0 2022    POTASSIUM 4.2 02/15/2022    CHLORIDE 104 2022    CHLORIDE 107 02/15/2022    CO2 23 2022    CO2 28 02/15/2022    BUN 2.4 (L) 2022    BUN 13 02/15/2022    CR 0.67 2022    CR 0.77 02/15/2022     (H) 2022     (H) 02/15/2022     COAGS: No results found for: PTT, INR, FIBR  POC:   Lab Results   Component  Value Date     (H) 03/02/2010     HEPATIC:   Lab Results   Component Value Date    ALBUMIN 4.3 09/02/2022    PROTTOTAL 7.1 09/02/2022    ALT 6 (L) 09/02/2022    AST 15 09/02/2022    ALKPHOS 70 09/02/2022    BILITOTAL 0.8 09/02/2022     OTHER:   Lab Results   Component Value Date    A1C 10.5 (H) 02/15/2022    DALE 9.2 09/02/2022    TSH 1.75 09/02/2022       Anesthesia Plan    ASA Status:  2      Anesthesia Type: MAC.     - Reason for MAC: immobility needed, straight local not clinically adequate              Consents    Anesthesia Plan(s) and associated risks, benefits, and realistic alternatives discussed. Questions answered and patient/representative(s) expressed understanding.     - Discussed: Risks, Benefits and Alternatives for BOTH SEDATION and the PROCEDURE were discussed     - Discussed with:  Patient      - Extended Intubation/Ventilatory Support Discussed: No.      - Patient is DNR/DNI Status: No    Use of blood products discussed: No .     Postoperative Care    Pain management: IV analgesics, Oral pain medications.        Comments:           H&P reviewed: Unable to attach H&P to encounter due to EHR limitations. H&P Update: appropriate H&P reviewed, patient examined. No interval changes since H&P (within 30 days).         Gabe Patricio MD, MD

## 2022-12-09 NOTE — ANESTHESIA CARE TRANSFER NOTE
Patient: Otto Hughes    Procedure: Procedure(s):  COLONOSCOPY       Diagnosis: Health care maintenance [Z00.00]  Diagnosis Additional Information: No value filed.    Anesthesia Type:   MAC     Note:    Oropharynx: oropharynx clear of all foreign objects and spontaneously breathing  Level of Consciousness: drowsy  Oxygen Supplementation: room air    Independent Airway: airway patency satisfactory and stable  Dentition: dentition unchanged  Vital Signs Stable: post-procedure vital signs reviewed and stable  Report to RN Given: handoff report given  Patient transferred to: Phase II    Handoff Report: Identifed the Patient, Identified the Reponsible Provider, Reviewed the pertinent medical history, Discussed the surgical course, Reviewed Intra-OP anesthesia mangement and issues during anesthesia, Set expectations for post-procedure period and Allowed opportunity for questions and acknowledgement of understanding      Vitals:  Vitals Value Taken Time   BP     Temp     Pulse     Resp     SpO2         Electronically Signed By: EDWARD Tejeda CRNA  December 9, 2022  1:25 PM

## 2022-12-09 NOTE — PROGRESS NOTES
[facility] OR [address]  5TH FLOOR  [facility] [address]  [phone]    2022    Otto Hughes  [address] [facility] [address]  [phone] (home)     :  1970    To Whom it May Concern:    Please excuse Otto T Ellen from work on 2022 and 12/10/2022 due to a procedure.    Please contact me for any questions or concerns.    Sincerely,          Cherry Cabral RN on behalf of Dr. Amisha Barnard MD

## 2022-12-09 NOTE — PROGRESS NOTES
[facility] OR [address]  5TH FLOOR  [facility] [address]  [phone]  [phone]    2022    Otto Hughes  [address] [facility] [address]  [phone] (home)     :  1970    To Whom it May Concern:    Otto Hughes missed work on 2022 due to a procedure. He can return to work on 12/10, in the afternoon.     Please contact me for any questions or concerns.    Sincerely,      Jennifer Dueñas RN for Dr. Bailey

## 2022-12-09 NOTE — ANESTHESIA POSTPROCEDURE EVALUATION
Patient: Otto Hughes    Procedure: Procedure(s):  COLONOSCOPY       Anesthesia Type:  MAC    Note:  Disposition: Outpatient   Postop Pain Control: Uneventful            Sign Out: Well controlled pain   PONV: No   Neuro/Psych: Uneventful            Sign Out: Acceptable/Baseline neuro status   Airway/Respiratory: Uneventful            Sign Out: Acceptable/Baseline resp. status   CV/Hemodynamics: Uneventful            Sign Out: Acceptable CV status; No obvious hypovolemia; No obvious fluid overload   Other NRE: NONE   DID A NON-ROUTINE EVENT OCCUR? No           Last vitals:  Vitals Value Taken Time   BP 93/53 12/09/22 1333   Temp     Pulse 76 12/09/22 1333   Resp 12 12/09/22 1326   SpO2 99 % 12/09/22 1333       Electronically Signed By: Gabe Patricio MD, MD  December 9, 2022  2:42 PM

## 2022-12-09 NOTE — H&P
Otot Hughes  [id number]  male  52 year old      Reason for procedure/surgery: screening     Patient Active Problem List   Diagnosis     Hyperlipidemia     T2DM (type 2 diabetes mellitus) (H)       Past Surgical History:    Past Surgical History:   Procedure Laterality Date     NO HISTORY OF SURGERY         Past Medical History:   Past Medical History:   Diagnosis Date     Hyperlipidemia LDL goal < 100      T2DM (type 2 diabetes mellitus) (H)        Social History:   Social History     Tobacco Use     Smoking status: Former     Packs/day: 0.50     Years: 17.00     Pack years: 8.50     Types: Cigarettes     Quit date: 2008     Years since quittin.9     Smokeless tobacco: Never   Substance Use Topics     Alcohol use: No       Family History:   Family History   Problem Relation Age of Onset     Diabetes Mother      Alcohol/Drug Brother      Cancer No family hx of      Melanoma No family hx of      Skin Cancer No family hx of        Allergies: No Known Allergies    Active Medications:   Current Outpatient Medications   Medication Sig Dispense Refill     bisacodyl (DULCOLAX) 5 MG EC tablet Take 2 tablets at 3 pm the day before your procedure. If your procedure is before 11 am, take 2 additional tablets at 11 pm. If your procedure is after 11 am, take 2 additional tablets at 6 am. For additional instructions refer to your colonoscopy prep instructions. 4 tablet 0     blood glucose (NO BRAND SPECIFIED) test strip Use to test blood sugar 3  times daily or as directed. 300 strip 2     blood glucose monitoring (NO BRAND SPECIFIED) meter device kit Use to test blood sugar 2-3 times daily or as directed. 1 kit 0     blood glucose monitoring (SOFTCLIX) lancets Use to test blood sugar 3 times daily or as directed. 300 each 2     Blood Pressure Monitoring (BLOOD PRESSURE KIT) KIT Take blood pressure daily and keep list for MD visits 1 kit 0     chlorhexidine (PERIDEX) 0.12 % solution Swish and spit 15 mLs in mouth 2  times daily 473 mL 0     insulin glargine (LANTUS SOLOSTAR) 100 UNIT/ML pen Inject 15 Units Subcutaneous daily For additional refills, please schedule a follow-up appointment at [phone]. 15 mL 0     insulin pen needle (31G X 5 MM) 31G X 5 MM miscellaneous Use 1 pen needles daily or as directed. 100 each 3     insulin pen needle (32G X 4 MM) 32G X 4 MM miscellaneous Use with insulin pen once a day as directed. 100 each 3     insulin pen needle 31G X 8 MM Use daily to inject Lantus. Please make ANNUAL exam with PRIMARY provider for continued refills. 30 each 5     lisinopril (ZESTRIL) 10 MG tablet Take 1 tablet (10 mg) by mouth daily 90 tablet 2     metFORMIN (GLUCOPHAGE XR) 500 MG 24 hr tablet Take 4 tablets (2,000 mg) by mouth daily (with dinner) 360 tablet 1     Multiple Vitamin (MULTIVITAMIN ADULT PO)        order for DME Equipment being ordered: Blood pressure cuff and monitor 1 Device 0     polyethylene glycol (GOLYTELY) 236 g suspension The night before the exam at 6 pm drink an 8-ounce glass every 15 minutes until the jug is half empty. If you arrive before 11 AM: Drink the other half of the Golytely jug at 11 PM night before procedure. If you arrive after 11 AM: Drink the other half of the Golytely jug at 6 AM day of procedure. For additional instructions refer to your colonoscopy prep instructions. 4000 mL 0     RYBELSUS 3 MG TABS TAKE ONE TABLET BY MOUTH ONCE DAILY 30 tablet 5     Semaglutide (RYBELSUS) 7 MG TABS Take 7 mg by mouth daily 30 tablet 1     sildenafil (VIAGRA) 100 MG tablet Take 1/2 to 1 full tab every day prn 20 tablet 11     simvastatin (ZOCOR) 20 MG tablet Take 1 tablet (20 mg) by mouth At Bedtime 90 tablet 2       Systemic Review:   CONSTITUTIONAL: NEGATIVE for fever, chills, change in weight  ENT/MOUTH: NEGATIVE for ear, mouth and throat problems  RESP: NEGATIVE for significant cough or SOB  CV: NEGATIVE for chest pain, palpitations or peripheral edema    Physical Examination:   Vital  Signs: There were no vitals taken for this visit.  GENERAL: healthy, alert and no distress  NECK: no adenopathy, no asymmetry, masses, or scars  RESP: lungs clear to auscultation - no rales, rhonchi or wheezes  CV: regular rate and rhythm, normal S1 S2, no S3 or S4, no murmur, click or rub, no peripheral edema and peripheral pulses strong  ABDOMEN: soft, nontender, no hepatosplenomegaly, no masses and bowel sounds normal  MS: no gross musculoskeletal defects noted, no edema    Plan: Appropriate to proceed as scheduled.      Amisha Bailey MD  12/9/2022    PCP:  Antelmo Carrera

## 2022-12-09 NOTE — DISCHARGE INSTRUCTIONS
Discharge Instructions after Colonoscopy or Sigmoidoscopy       Today you had a Colonoscopy      Activity and Diet   You were given medicine for pain. You may be dizzy or sleepy.   For 24 hours:    Do not drive or use heavy equipment.    Do not make important decisions.    Do not drink any alcohol.   You may return to your normal diet and medicines.       Discomfort    Air was placed in your colon during the exam in order to see it. Walking helps to pass the air.    You may take Tylenol (acetaminophen) for pain unless your doctor has told you not to.   Do not take aspirin or ibuprofen (Advil, Motrin, or other anti-inflammatory   drugs) for _____ days.       Follow-up   ____ We took small tissue samples or polyps to study. Your doctor will call you with the results within two weeks.       When to call:       Call right away if you have:    Unusual pain in belly or chest pain not relieved with passing air.    More than 1 to 2 Tablespoons of bleeding from your rectum.    Fever above 100.6  F (37.5  C).       If you have severe pain, bleeding, or shortness of breath, go to an emergency room.       If you have questions, call:   Monday to Friday, 7 a.m. to 4:30 p.m.   Endoscopy: [phone] (We may have to call you back)       After hours   Hospital: [phone] (Ask for the GI fellow on call)

## 2023-01-11 NOTE — TELEPHONE ENCOUNTER
FUTURE VISIT INFORMATION      FUTURE VISIT INFORMATION:    Date: 4.10.23    Time: 10:35    [place] [facility] [place]ERRAL INFORMATION:    Referring provider:  Debi    Referring providers clinic:  [facility]    Reason for visit/diagnosis  Hair loss [L65.9]/ referred by Dr Antelmo Carrera/ recs in Epic/ appt sched per pt    RECORDS REQUESTED FROM:       Clinic name Comments Records Status   [facility] 9.2.22  [facility]

## 2023-02-12 DIAGNOSIS — E11.65 TYPE 2 DIABETES MELLITUS WITH HYPERGLYCEMIA, UNSPECIFIED WHETHER LONG TERM INSULIN USE (H): ICD-10-CM

## 2023-02-12 DIAGNOSIS — N52.8 OTHER MALE ERECTILE DYSFUNCTION: ICD-10-CM

## 2023-02-12 DIAGNOSIS — E78.5 HYPERLIPIDEMIA, UNSPECIFIED HYPERLIPIDEMIA TYPE: ICD-10-CM

## 2023-02-14 RX ORDER — SILDENAFIL 100 MG/1
TABLET, FILM COATED ORAL
Qty: 20 TABLET | Refills: 11 | Status: SHIPPED | OUTPATIENT
Start: 2023-02-14 | End: 2024-04-15

## 2023-02-14 RX ORDER — SIMVASTATIN 20 MG
TABLET ORAL
Qty: 90 TABLET | Refills: 2 | Status: SHIPPED | OUTPATIENT
Start: 2023-02-14 | End: 2023-08-14

## 2023-03-13 DIAGNOSIS — E11.65 TYPE 2 DIABETES MELLITUS WITH HYPERGLYCEMIA, UNSPECIFIED WHETHER LONG TERM INSULIN USE (H): ICD-10-CM

## 2023-03-15 RX ORDER — METFORMIN HCL 500 MG
2000 TABLET, EXTENDED RELEASE 24 HR ORAL
Qty: 360 TABLET | Refills: 0 | Status: SHIPPED | OUTPATIENT
Start: 2023-03-15 | End: 2023-06-21

## 2023-03-15 NOTE — TELEPHONE ENCOUNTER
LCV:9/27/2022  [facility] [place]  overdue A1C, FYI to clinic  RF 90 day

## 2023-04-10 ENCOUNTER — PRE VISIT (OUTPATIENT)
Dept: DERMATOLOGY | Facility: CLINIC | Age: 53
End: 2023-04-10

## 2023-04-27 DIAGNOSIS — E11.9 TYPE 2 DIABETES MELLITUS WITHOUT COMPLICATION, UNSPECIFIED WHETHER LONG TERM INSULIN USE (H): ICD-10-CM

## 2023-05-01 RX ORDER — LISINOPRIL 10 MG/1
10 TABLET ORAL DAILY
Qty: 90 TABLET | Refills: 1 | Status: SHIPPED | OUTPATIENT
Start: 2023-05-01 | End: 2023-08-14

## 2023-05-23 DIAGNOSIS — E11.9 TYPE 2 DIABETES MELLITUS WITHOUT COMPLICATION, UNSPECIFIED WHETHER LONG TERM INSULIN USE (H): ICD-10-CM

## 2023-05-24 ENCOUNTER — TELEPHONE (OUTPATIENT)
Dept: FAMILY MEDICINE | Facility: CLINIC | Age: 53
End: 2023-05-24
Payer: COMMERCIAL

## 2023-05-24 DIAGNOSIS — E11.9 TYPE 2 DIABETES MELLITUS WITHOUT COMPLICATION, UNSPECIFIED WHETHER LONG TERM INSULIN USE (H): ICD-10-CM

## 2023-05-25 RX ORDER — BLOOD-GLUCOSE METER
KIT MISCELLANEOUS
Qty: 300 STRIP | Refills: 3 | Status: SHIPPED | OUTPATIENT
Start: 2023-05-25

## 2023-05-25 NOTE — TELEPHONE ENCOUNTER
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

## 2023-05-26 RX ORDER — ORAL SEMAGLUTIDE 7 MG/1
7 TABLET ORAL DAILY
Qty: 30 TABLET | Status: CANCELLED | OUTPATIENT
Start: 2023-05-26

## 2023-05-26 NOTE — TELEPHONE ENCOUNTER
Rybelsus 3 mg tablet last prescribed on 12/02/22. Rybelsus 7 mg tablet has not been prescribed since 2/21/22, was for #30 with 1 refill at that time. Per dispense report, rybelsus 3 mg tablet has been filled most recently.    Last visit with PCP on 9/2/22. No upcoming appointments.     Appointment recommended to discuss increased semaglutide dose.    Nona Velazquez RN ([facility])

## 2023-05-26 NOTE — TELEPHONE ENCOUNTER
Semaglutide (RYBELSUS) 7 MG tablet      Last Written Prescription Date:  2/21/22  Last Fill Quantity: 30,   # refills: 1  Last Office Visit : 9/2/22  Future Office visit:  none    Routing refill request to provider for review/approval because:  Overdue for 6mo office visit  Overdue for A1c

## 2023-05-30 DIAGNOSIS — E11.9 TYPE 2 DIABETES MELLITUS WITHOUT COMPLICATION, UNSPECIFIED WHETHER LONG TERM INSULIN USE (H): ICD-10-CM

## 2023-06-05 RX ORDER — PEN NEEDLE, DIABETIC 32GX 5/32"
NEEDLE, DISPOSABLE MISCELLANEOUS
Qty: 100 EACH | Refills: 0 | Status: SHIPPED | OUTPATIENT
Start: 2023-06-05 | End: 2023-10-09

## 2023-06-05 RX ORDER — INSULIN GLARGINE-YFGN 100 [IU]/ML
15 INJECTION, SOLUTION SUBCUTANEOUS DAILY
Qty: 15 ML | Refills: 0 | Status: SHIPPED | OUTPATIENT
Start: 2023-06-05 | End: 2023-08-14

## 2023-06-17 DIAGNOSIS — E11.65 TYPE 2 DIABETES MELLITUS WITH HYPERGLYCEMIA, UNSPECIFIED WHETHER LONG TERM INSULIN USE (H): ICD-10-CM

## 2023-06-21 RX ORDER — METFORMIN HCL 500 MG
TABLET, EXTENDED RELEASE 24 HR ORAL
Qty: 120 TABLET | Refills: 0 | Status: SHIPPED | OUTPATIENT
Start: 2023-06-21 | End: 2023-07-20

## 2023-06-21 NOTE — TELEPHONE ENCOUNTER
metFORMIN (GLUCOPHAGE XR) 500 MG 24 hr tablet      Last Written Prescription Date:  03-  Last Fill Quantity: 360,   # refills: 0  Last Office Visit : 9-2-2022  Future Office visit:  Not on fi;le    Routing refill request to provider for review/approval because:  Biguanide Agents Failed 06/17/2023 02:50 PM   Protocol Details  Patient has documented A1c within the specified period of time.    Recent (6 mo) or future (30 days) visit within the authorizing provider's specialty     Lab Test 02/15/22  0757   A1C 10.5*     Kely refill ,2nd notice to call for appt  Scheduling has been notified to contact the pt for appointment.

## 2023-07-18 DIAGNOSIS — E11.9 TYPE 2 DIABETES MELLITUS WITHOUT COMPLICATION, UNSPECIFIED WHETHER LONG TERM INSULIN USE (H): ICD-10-CM

## 2023-07-18 DIAGNOSIS — E11.65 TYPE 2 DIABETES MELLITUS WITH HYPERGLYCEMIA, UNSPECIFIED WHETHER LONG TERM INSULIN USE (H): ICD-10-CM

## 2023-07-20 ENCOUNTER — TELEPHONE (OUTPATIENT)
Dept: FAMILY MEDICINE | Facility: CLINIC | Age: 53
End: 2023-07-20
Payer: COMMERCIAL

## 2023-07-20 RX ORDER — ORAL SEMAGLUTIDE 3 MG/1
TABLET ORAL
Qty: 30 TABLET | Refills: 0 | Status: SHIPPED | OUTPATIENT
Start: 2023-07-20 | End: 2023-08-14

## 2023-07-20 RX ORDER — METFORMIN HCL 500 MG
TABLET, EXTENDED RELEASE 24 HR ORAL
Qty: 120 TABLET | Refills: 0 | Status: SHIPPED | OUTPATIENT
Start: 2023-07-20 | End: 2023-08-14

## 2023-07-20 NOTE — TELEPHONE ENCOUNTER
[facility] Call Center    Phone Message    May a detailed message be left on voicemail: yes     Reason for Call: Medication Refill Request    Has the patient contacted the pharmacy for the refill? Yes   Name of medication being requested: metFORMIN (GLUCOPHAGE XR) 500 MG 24 hr tablet & RYBELSUS 3 MG TABS  Provider who prescribed the medication: Debi  Pharmacy:  [facility] - [place], [place] - [address] [place] [address]    Patient states he is out of his medications, please fill asap.    Date medication is needed: 07/20/2023     Action Taken: Other: PCC    Travel Screening: Not Applicable

## 2023-07-20 NOTE — TELEPHONE ENCOUNTER
Patient has an appointment with Dr. Carrera in August. Refills sent to last until appointment.    Nona Velazquez RN ([facility])

## 2023-07-25 DIAGNOSIS — E11.9 DIABETES MELLITUS, TYPE 2 (H): ICD-10-CM

## 2023-07-26 RX ORDER — LANCETS 28 GAUGE
EACH MISCELLANEOUS
Qty: 300 EACH | Refills: 1 | Status: SHIPPED | OUTPATIENT
Start: 2023-07-26 | End: 2024-01-19

## 2023-07-26 NOTE — TELEPHONE ENCOUNTER
Lancets    9/27/2022  [facility] Internal Medicine [place]     Dayana Terrazas MD  Student in organized health care education/training program     Nv: 8/3/23

## 2023-08-14 ENCOUNTER — OFFICE VISIT (OUTPATIENT)
Dept: FAMILY MEDICINE | Facility: CLINIC | Age: 53
End: 2023-08-14
Payer: COMMERCIAL

## 2023-08-14 VITALS
HEART RATE: 86 BPM | OXYGEN SATURATION: 99 % | SYSTOLIC BLOOD PRESSURE: 146 MMHG | BODY MASS INDEX: 21.06 KG/M2 | TEMPERATURE: 98.1 F | DIASTOLIC BLOOD PRESSURE: 79 MMHG | WEIGHT: 151 LBS

## 2023-08-14 DIAGNOSIS — L84 CALLUS OF FOOT: ICD-10-CM

## 2023-08-14 DIAGNOSIS — E11.65 TYPE 2 DIABETES MELLITUS WITH HYPERGLYCEMIA, WITH LONG-TERM CURRENT USE OF INSULIN (H): Primary | ICD-10-CM

## 2023-08-14 DIAGNOSIS — Z11.4 SCREENING FOR HIV WITHOUT PRESENCE OF RISK FACTORS: ICD-10-CM

## 2023-08-14 DIAGNOSIS — Z23 ENCOUNTER FOR IMMUNIZATION: ICD-10-CM

## 2023-08-14 DIAGNOSIS — Z11.59 ENCOUNTER FOR HEPATITIS C SCREENING TEST FOR LOW RISK PATIENT: ICD-10-CM

## 2023-08-14 DIAGNOSIS — E78.5 HYPERLIPIDEMIA, UNSPECIFIED HYPERLIPIDEMIA TYPE: ICD-10-CM

## 2023-08-14 DIAGNOSIS — Z79.4 TYPE 2 DIABETES MELLITUS WITH HYPERGLYCEMIA, WITH LONG-TERM CURRENT USE OF INSULIN (H): Primary | ICD-10-CM

## 2023-08-14 PROCEDURE — 99214 OFFICE O/P EST MOD 30 MIN: CPT | Performed by: FAMILY MEDICINE

## 2023-08-14 PROCEDURE — 99207 PR FOOT EXAM NO CHARGE: CPT | Performed by: FAMILY MEDICINE

## 2023-08-14 RX ORDER — INSULIN GLARGINE-YFGN 100 [IU]/ML
16 INJECTION, SOLUTION SUBCUTANEOUS DAILY
Qty: 15 ML | Refills: 4 | Status: SHIPPED | OUTPATIENT
Start: 2023-08-14 | End: 2024-01-25

## 2023-08-14 RX ORDER — ORAL SEMAGLUTIDE 3 MG/1
3 TABLET ORAL DAILY
Qty: 90 TABLET | Refills: 3 | Status: SHIPPED | OUTPATIENT
Start: 2023-08-14

## 2023-08-14 RX ORDER — METFORMIN HCL 500 MG
TABLET, EXTENDED RELEASE 24 HR ORAL
Qty: 360 TABLET | Refills: 3 | Status: SHIPPED | OUTPATIENT
Start: 2023-08-14 | End: 2024-08-26

## 2023-08-14 RX ORDER — LISINOPRIL 10 MG/1
10 TABLET ORAL DAILY
Qty: 90 TABLET | Refills: 3 | Status: SHIPPED | OUTPATIENT
Start: 2023-08-14 | End: 2024-08-22

## 2023-08-14 RX ORDER — SIMVASTATIN 20 MG
20 TABLET ORAL AT BEDTIME
Qty: 90 TABLET | Refills: 3 | Status: SHIPPED | OUTPATIENT
Start: 2023-08-14 | End: 2024-09-05

## 2023-08-14 NOTE — PROGRESS NOTES
Assessment & Plan     Type 2 diabetes mellitus with hyperglycemia, with long-term current use of insulin (H)  Today reviewed need for fasting labs he agreed to return next Monday for lab visit as it works with his work schedule.  I renewed diabetic medications encouraged follow-up with his primary care provider  - Lipid panel reflex to direct LDL Fasting  - Hemoglobin A1c  - Comprehensive metabolic panel (BMP + Alb, Alk Phos, ALT, AST, Total. Bili, TP)  - Albumin Random Urine Quantitative with Creat Ratio  - Insulin Glargine-yfgn (SEMGLEE, YFGN,) 100 UNIT/ML SOPN  Dispense: 15 mL; Refill: 4  - metFORMIN (GLUCOPHAGE XR) 500 MG 24 hr tablet  Dispense: 360 tablet; Refill: 3  - lisinopril (ZESTRIL) 10 MG tablet  Dispense: 90 tablet; Refill: 3  - Semaglutide (RYBELSUS) 3 MG tablet  Dispense: 90 tablet; Refill: 3    Hyperlipidemia, unspecified hyperlipidemia type  Due for fasting lipids ordered for next Monday, renewed simvastatin  - Lipid panel reflex to direct LDL Fasting  - simvastatin (ZOCOR) 20 MG tablet  Dispense: 90 tablet; Refill: 3    Callus of foot  Today we reviewed foot care including CeraVe a cream use of wider toe box shoe, offered podiatry he declined.  I also reviewed importance of cutting his toenails straight across.    Encounter for hepatitis C screening test for low risk patient  He agreed to hepatitis C screening  - Hepatitis C Screen Reflex to HCV RNA Quant and Genotype    Screening for HIV without presence of risk factors  Agreed to HIV screening  - HIV Antigen Antibody Combo    Encounter for immunization  Today we are checking for hepatitis B immunity and hep A immunity if not immune he would agree to Twinrix hepatitis a and B vaccine series.  - Hepatitis B Surface Antibody  - Hepatitis B surface antigen  - Hepatitis Antibody A IgG    Chart review, history, exam, diagnostics review, documentation, counseling and coordination of cares as noted.    Patient Instructions   Cerave cream to help  moisturize feet      Covid booster and flu vaccine in the fall through local pharmacy     Return in about 3 months (around 11/14/2023) for Physical Exam.    Aditya Jett MD  [facility] JENNIFER Menjivar is a 53 year old, presenting for the following health issues:  Follow Up (Pt here for follow up on diabetes)    MARZENA Hughes 53 history of type II diabetes, hyperlipidemia, hypertension follows with Dr. Carrera presents for primary care follow-up of his chronic health conditions, first visit with me.  He was unable to get an appointment with primary during the day and I had after work appointment available.  Diabetes type 2, hyperlipidemia  He is not fasting today and prefers to return for fasting labs.  He requires renewal of his medications taking metformin XR 2000 mg at dinner, glargine insulin 16 units daily, semaglutide 3 mg daily,Zocor 20 mg tolerating all well.  Today he agreed to diabetic foot exam.  Hypertension  Blood pressure previously well controlled on lisinopril 10 mg daily.  Systolic blood pressure slightly elevated today.  He indicates worked all day, past readings have been in excellent control.      Labs reviewed in EPIC  BP Readings from Last 3 Encounters:   08/14/23 (!) 146/79   12/09/22 93/53   09/27/22 124/78    Wt Readings from Last 3 Encounters:   08/14/23 68.5 kg (151 lb)   09/27/22 66.3 kg (146 lb 1.6 oz)   09/02/22 67.1 kg (148 lb)                  Patient Active Problem List   Diagnosis    Hyperlipidemia    T2DM (type 2 diabetes mellitus) (H)     Past Surgical History:   Procedure Laterality Date    COLONOSCOPY N/A 12/9/2022    Procedure: COLONOSCOPY;  Surgeon: Amisha Bailey MD;  Location: [facility] OR    NO HISTORY OF SURGERY         Social History     Tobacco Use    Smoking status: Former     Packs/day: 0.50     Years: 17.00     Pack years: 8.50     Types: Cigarettes     Quit date: 1/1/2008     Years since quitting: 15.6     Smokeless tobacco: Never   Substance Use Topics    Alcohol use: No     Family History   Problem Relation Age of Onset    Diabetes Mother     Alcohol/Drug Brother     Cancer No family hx of     Melanoma No family hx of     Skin Cancer No family hx of          Current Outpatient Medications   Medication Sig Dispense Refill    bisacodyl (DULCOLAX) 5 MG EC tablet Take 2 tablets at 3 pm the day before your procedure. If your procedure is before 11 am, take 2 additional tablets at 11 pm. If your procedure is after 11 am, take 2 additional tablets at 6 am. For additional instructions refer to your colonoscopy prep instructions. 4 tablet 0    blood glucose (FREESTYLE LITE) test strip USE TO TEST BLOOD SUGAR THREE TIMES A DAY OR AS DIRECTED 300 strip 3    blood glucose monitoring (FREESTYLE) lancets USE TO TEST BLOOD SUGAR THREE TIMES A DAY OR AS DIRECTED 300 each 1    blood glucose monitoring (NO BRAND SPECIFIED) meter device kit Use to test blood sugar 2-3 times daily or as directed. 1 kit 0    Blood Pressure Monitoring (BLOOD PRESSURE KIT) KIT Take blood pressure daily and keep list for MD visits 1 kit 0    chlorhexidine (PERIDEX) 0.12 % solution Swish and spit 15 mLs in mouth 2 times daily 473 mL 0    Insulin Glargine-yfgn (SEMGLEE, YFGN,) 100 UNIT/ML SOPN Inject 15 Units Subcutaneous daily 15 mL 0    insulin pen needle (31G X 5 MM) 31G X 5 MM miscellaneous Use 1 pen needles daily or as directed. 100 each 3    insulin pen needle (BD MO U/F) 32G X 4 MM miscellaneous USE  WITH INSULIN PEN ONCE DAILY AS DIRECTED 100 each 0    insulin pen needle 31G X 8 MM Use daily to inject Lantus. Please make ANNUAL exam with PRIMARY provider for continued refills. 30 each 5    lisinopril (ZESTRIL) 10 MG tablet Take 1 tablet (10 mg) by mouth daily 90 tablet 1    metFORMIN (GLUCOPHAGE XR) 500 MG 24 hr tablet TAKE FOUR TABLETS BY MOUTH ONCE DAILY WITH DINNER. 120 tablet 0    Multiple Vitamin (MULTIVITAMIN ADULT PO)       order for DME  Equipment being ordered: Blood pressure cuff and monitor 1 Device 0    polyethylene glycol (GOLYTELY) 236 g suspension The night before the exam at 6 pm drink an 8-ounce glass every 15 minutes until the jug is half empty. If you arrive before 11 AM: Drink the other half of the Golytely jug at 11 PM night before procedure. If you arrive after 11 AM: Drink the other half of the Golytely jug at 6 AM day of procedure. For additional instructions refer to your colonoscopy prep instructions. 4000 mL 0    RYBELSUS 3 MG tablet TAKE ONE TABLET BY MOUTH ONCE DAILY 30 tablet 0    Semaglutide (RYBELSUS) 7 MG TABS Take 7 mg by mouth daily 30 tablet 1    sildenafil (VIAGRA) 100 MG tablet TAKE ONE-HALF TO ONE TABLET BY MOUTH ONCE DAILY AS NEEDED 20 tablet 11    simvastatin (ZOCOR) 20 MG tablet TAKE ONE TABLET BY MOUTH EVERY NIGHT AT BEDTIME 90 tablet 2     No Known Allergies  Recent Labs   Lab Test 09/02/22  0911 02/15/22  0757 12/30/20  0644 11/30/20  1756 02/04/20  1127   A1C  --  10.5*  --  12.7* 13.4*   LDL 74 61  --   --  77   HDL 43 43  --   --  44   TRIG 80 101  --   --  180*   ALT 6* 16 17  --  21   CR 0.67 0.77 0.62*  --  0.55*   GFRESTIMATED >90 >90 >90  --  >90   GFRESTBLACK  --   --  >90  --  >90   POTASSIUM 4.0 4.2 3.6  --  4.0   TSH 1.75 1.21  --   --  0.58         Review of Systems   Problem list, PMH, Surgical HX, FH, SH, allergies, medications,immunizations reviewed and updated in Epic. 10 point ROS negative other than noted in HPI and ROS.       Objective    BP (!) 146/79 (BP Location: Right arm, Patient Position: Sitting, Cuff Size: Adult Regular)   Pulse 86   Temp 98.1  F (36.7  C)   Wt 68.5 kg (151 lb)   SpO2 99%   BMI 21.06 kg/m    Body mass index is 21.06 kg/m .  Physical Exam   GENERAL: healthy, alert and no distress  EYES: Eyes grossly normal to inspection, PERRL and conjunctivae and sclerae normal  HENT: ear canals and TM's normal, mouth without ulcers or lesions  NECK: no adenopathy, no asymmetry,  masses, or scars and thyroid normal to palpation  RESP: lungs clear to auscultation - no rales, rhonchi or wheezes  CV: regular rate and rhythm, normal S1 S2, no S3 or S4, no murmur, click or rub, no peripheral edema and peripheral pulses strong  ABDOMEN: soft, nontender, no hepatosplenomegaly, no masses and bowel sounds normal  MS: no gross musculoskeletal defects noted, no edema see foot exam, slight irregular nails requiring trim straight across otherwise good foot care  SKIN: no suspicious lesions or rashes  NEURO: Normal strength and tone, mentation intact and speech normal  PSYCH: mentation appears normal, affect normal, interactive                  Diabetic Foot Screen:  Any complaints of increased pain or numbness ? No  Is there a foot ulcer now or a history of foot ulcer? No  Does the foot have an abnormal shape? No he has 1 small callus on the left fifth toe from wearing very tight shoes has since changed to wider toebox.  Are the nails thick, too long or ingrown?  YES slightly irregular requiring trim not thickened  Are there any redness or open areas? No         Sensation Testing done at all points on the diagram with monofilament     Right Foot: Sensation Normal at all points  Left Foot: Sensation Normal at all points     Risk Category: 0- No loss of protective sensation  Performed by Aditya Jett MD

## 2023-08-14 NOTE — PATIENT INSTRUCTIONS
Cerave cream to help moisturize feet      Covid booster and flu vaccine in the fall through local pharmacy

## 2023-08-14 NOTE — NURSING NOTE
Otto Hughes is a 53 year old male that presents in clinic today for the following:     Chief Complaint   Patient presents with    Follow Up     Pt here for follow up on diabetes       The patient's allergies and medications were reviewed. The patient's vitals were obtained without incident. The patient does not have any other questions for the provider.     Rod Flores, EMT at 5:28 PM on 8/14/2023.  Primary Care Clinic: [phone]

## 2023-09-17 DIAGNOSIS — E11.9 TYPE 2 DIABETES MELLITUS WITHOUT COMPLICATION, UNSPECIFIED WHETHER LONG TERM INSULIN USE (H): ICD-10-CM

## 2023-10-07 DIAGNOSIS — E11.9 TYPE 2 DIABETES MELLITUS WITHOUT COMPLICATION, UNSPECIFIED WHETHER LONG TERM INSULIN USE (H): ICD-10-CM

## 2023-10-09 RX ORDER — PEN NEEDLE, DIABETIC 32GX 5/32"
NEEDLE, DISPOSABLE MISCELLANEOUS
Qty: 100 EACH | Refills: 0 | Status: SHIPPED | OUTPATIENT
Start: 2023-10-09 | End: 2024-01-19

## 2023-10-09 NOTE — TELEPHONE ENCOUNTER
insulin pen needle (BD MO U/F) 32G X 4 MM miscellaneous 100 each 0 6/5/2023     Last Office Visit : 8/14/2023   Future Office visit:  11/14/23

## 2024-01-16 DIAGNOSIS — E11.9 DIABETES MELLITUS, TYPE 2 (H): ICD-10-CM

## 2024-01-16 DIAGNOSIS — E11.9 TYPE 2 DIABETES MELLITUS WITHOUT COMPLICATION, UNSPECIFIED WHETHER LONG TERM INSULIN USE (H): ICD-10-CM

## 2024-01-19 RX ORDER — PEN NEEDLE, DIABETIC 32GX 5/32"
NEEDLE, DISPOSABLE MISCELLANEOUS
Qty: 100 EACH | Refills: 0 | Status: SHIPPED | OUTPATIENT
Start: 2024-01-19 | End: 2024-04-26

## 2024-01-19 RX ORDER — LANCETS 28 GAUGE
EACH MISCELLANEOUS
Qty: 300 EACH | Refills: 1 | Status: SHIPPED | OUTPATIENT
Start: 2024-01-19

## 2024-01-20 NOTE — TELEPHONE ENCOUNTER
blood glucose monitoring (FREESTYLE) lancets 300 each 1 7/26/2023    insulin pen needle (BD MO U/F) 32G X 4 MM miscellaneous 100 each 0 10/9/2023    Last Office Visit : 8/14/2023  [facility] Aditya Gray MD     Future Office visit:  0

## 2024-01-24 ENCOUNTER — OFFICE VISIT (OUTPATIENT)
Dept: INTERNAL MEDICINE | Facility: CLINIC | Age: 54
End: 2024-01-24
Payer: COMMERCIAL

## 2024-01-24 ENCOUNTER — LAB (OUTPATIENT)
Dept: LAB | Facility: CLINIC | Age: 54
End: 2024-01-24
Payer: COMMERCIAL

## 2024-01-24 ENCOUNTER — ANCILLARY PROCEDURE (OUTPATIENT)
Dept: CT IMAGING | Facility: CLINIC | Age: 54
End: 2024-01-24
Attending: NURSE PRACTITIONER
Payer: COMMERCIAL

## 2024-01-24 VITALS
WEIGHT: 151.8 LBS | TEMPERATURE: 97.6 F | OXYGEN SATURATION: 99 % | HEART RATE: 120 BPM | BODY MASS INDEX: 21.17 KG/M2 | DIASTOLIC BLOOD PRESSURE: 79 MMHG | SYSTOLIC BLOOD PRESSURE: 135 MMHG

## 2024-01-24 DIAGNOSIS — E78.5 HYPERLIPIDEMIA, UNSPECIFIED HYPERLIPIDEMIA TYPE: ICD-10-CM

## 2024-01-24 DIAGNOSIS — R00.0 TACHYCARDIA: ICD-10-CM

## 2024-01-24 DIAGNOSIS — Z23 ENCOUNTER FOR IMMUNIZATION: ICD-10-CM

## 2024-01-24 DIAGNOSIS — Z79.4 TYPE 2 DIABETES MELLITUS WITH HYPERGLYCEMIA, WITH LONG-TERM CURRENT USE OF INSULIN (H): ICD-10-CM

## 2024-01-24 DIAGNOSIS — E11.65 TYPE 2 DIABETES MELLITUS WITH HYPERGLYCEMIA, WITH LONG-TERM CURRENT USE OF INSULIN (H): ICD-10-CM

## 2024-01-24 DIAGNOSIS — S29.9XXA TRAUMATIC INJURY OF CHEST WALL: ICD-10-CM

## 2024-01-24 DIAGNOSIS — R52 WHOLE BODY PAIN: ICD-10-CM
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
## 2024-01-24 DIAGNOSIS — E11.9 T2DM (TYPE 2 DIABETES MELLITUS) (H): ICD-10-CM
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
## 2024-01-24 DIAGNOSIS — Z11.59 ENCOUNTER FOR HEPATITIS C SCREENING TEST FOR LOW RISK PATIENT: ICD-10-CM

## 2024-01-24 DIAGNOSIS — Z11.4 SCREENING FOR HIV WITHOUT PRESENCE OF RISK FACTORS: ICD-10-CM

## 2024-01-24 DIAGNOSIS — R01.1 HEART MURMUR: ICD-10-CM

## 2024-01-24 LAB
ALBUMIN SERPL BCG-MCNC: 4.3 G/DL (ref 3.5–5.2)
ALP SERPL-CCNC: 89 U/L (ref 40–150)
ALT SERPL W P-5'-P-CCNC: 26 U/L (ref 0–70)
ANION GAP SERPL CALCULATED.3IONS-SCNC: 11 MMOL/L (ref 7–15)
AST SERPL W P-5'-P-CCNC: 23 U/L (ref 0–45)
BASOPHILS # BLD AUTO: 0.1 10E3/UL (ref 0–0.2)
BASOPHILS NFR BLD AUTO: 1 %
BILIRUB SERPL-MCNC: 0.8 MG/DL
BUN SERPL-MCNC: 10.1 MG/DL (ref 6–20)
CALCIUM SERPL-MCNC: 9.6 MG/DL (ref 8.6–10)
CHLORIDE SERPL-SCNC: 98 MMOL/L (ref 98–107)
CHOLEST SERPL-MCNC: 151 MG/DL
CREAT SERPL-MCNC: 0.71 MG/DL (ref 0.67–1.17)
DEPRECATED HCO3 PLAS-SCNC: 26 MMOL/L (ref 22–29)
EGFRCR SERPLBLD CKD-EPI 2021: >90 ML/MIN/1.73M2
EOSINOPHIL # BLD AUTO: 0.1 10E3/UL (ref 0–0.7)
EOSINOPHIL NFR BLD AUTO: 0 %
ERYTHROCYTE [DISTWIDTH] IN BLOOD BY AUTOMATED COUNT: 11.5 % (ref 10–15)
FASTING STATUS PATIENT QL REPORTED: NORMAL
GLUCOSE SERPL-MCNC: 420 MG/DL (ref 70–99)
HBA1C MFR BLD: 14.1 %
HCT VFR BLD AUTO: 47.6 % (ref 40–53)
HDLC SERPL-MCNC: 51 MG/DL
HGB BLD-MCNC: 16.5 G/DL (ref 13.3–17.7)
IMM GRANULOCYTES # BLD: 0.1 10E3/UL
IMM GRANULOCYTES NFR BLD: 0 %
LDLC SERPL CALC-MCNC: 75 MG/DL
LYMPHOCYTES # BLD AUTO: 1.3 10E3/UL (ref 0.8–5.3)
LYMPHOCYTES NFR BLD AUTO: 9 %
MCH RBC QN AUTO: 30.7 PG (ref 26.5–33)
MCHC RBC AUTO-ENTMCNC: 34.7 G/DL (ref 31.5–36.5)
MCV RBC AUTO: 89 FL (ref 78–100)
MONOCYTES # BLD AUTO: 1.2 10E3/UL (ref 0–1.3)
MONOCYTES NFR BLD AUTO: 9 %
NEUTROPHILS # BLD AUTO: 11.4 10E3/UL (ref 1.6–8.3)
NEUTROPHILS NFR BLD AUTO: 81 %
NONHDLC SERPL-MCNC: 100 MG/DL
NRBC # BLD AUTO: 0 10E3/UL
NRBC BLD AUTO-RTO: 0 /100
PLATELET # BLD AUTO: 276 10E3/UL (ref 150–450)
POTASSIUM SERPL-SCNC: 3.9 MMOL/L (ref 3.4–5.3)
PROT SERPL-MCNC: 7.6 G/DL (ref 6.4–8.3)
RBC # BLD AUTO: 5.37 10E6/UL (ref 4.4–5.9)
SODIUM SERPL-SCNC: 135 MMOL/L (ref 135–145)
TRIGL SERPL-MCNC: 123 MG/DL
WBC # BLD AUTO: 14.1 10E3/UL (ref 4–11)

## 2024-01-24 PROCEDURE — 86803 HEPATITIS C AB TEST: CPT | Performed by: FAMILY MEDICINE

## 2024-01-24 PROCEDURE — 99000 SPECIMEN HANDLING OFFICE-LAB: CPT | Performed by: PATHOLOGY

## 2024-01-24 PROCEDURE — 87389 HIV-1 AG W/HIV-1&-2 AB AG IA: CPT | Performed by: FAMILY MEDICINE

## 2024-01-24 PROCEDURE — 71260 CT THORAX DX C+: CPT | Mod: GC | Performed by: RADIOLOGY

## 2024-01-24 PROCEDURE — 80061 LIPID PANEL: CPT | Performed by: PATHOLOGY

## 2024-01-24 PROCEDURE — 83036 HEMOGLOBIN GLYCOSYLATED A1C: CPT | Performed by: FAMILY MEDICINE

## 2024-01-24 PROCEDURE — 86706 HEP B SURFACE ANTIBODY: CPT | Performed by: FAMILY MEDICINE

## 2024-01-24 PROCEDURE — 82043 UR ALBUMIN QUANTITATIVE: CPT | Performed by: FAMILY MEDICINE

## 2024-01-24 PROCEDURE — 80053 COMPREHEN METABOLIC PANEL: CPT | Performed by: PATHOLOGY

## 2024-01-24 PROCEDURE — 36415 COLL VENOUS BLD VENIPUNCTURE: CPT | Performed by: PATHOLOGY

## 2024-01-24 PROCEDURE — 85025 COMPLETE CBC W/AUTO DIFF WBC: CPT | Performed by: PATHOLOGY

## 2024-01-24 PROCEDURE — 99215 OFFICE O/P EST HI 40 MIN: CPT | Mod: 25 | Performed by: NURSE PRACTITIONER

## 2024-01-24 PROCEDURE — 74177 CT ABD & PELVIS W/CONTRAST: CPT | Mod: GC | Performed by: RADIOLOGY

## 2024-01-24 PROCEDURE — 87340 HEPATITIS B SURFACE AG IA: CPT | Performed by: FAMILY MEDICINE

## 2024-01-24 PROCEDURE — 93000 ELECTROCARDIOGRAM COMPLETE: CPT | Performed by: NURSE PRACTITIONER

## 2024-01-24 RX ORDER — TIZANIDINE 2 MG/1
2 TABLET ORAL 2 TIMES DAILY PRN
Qty: 20 TABLET | Refills: 0 | Status: SHIPPED | OUTPATIENT
Start: 2024-01-24 | End: 2024-02-23

## 2024-01-24 RX ORDER — IOPAMIDOL 755 MG/ML
82 INJECTION, SOLUTION INTRAVASCULAR ONCE
Status: COMPLETED | OUTPATIENT
Start: 2024-01-24 | End: 2024-01-24

## 2024-01-24 RX ADMIN — IOPAMIDOL 82 ML: 755 INJECTION, SOLUTION INTRAVASCULAR at 17:16
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
## 2024-01-24 NOTE — DISCHARGE INSTRUCTIONS

## 2024-01-24 NOTE — LETTER
January 25, 2024      Otto Hughes  [address] [facility] [address]        Dear ,    We are writing to inform you of your test results.    Results from 1/24/24 reviewed ordered under my name but seen by MOSES Damico. Diabetes uncontrolled A1C 14.1 glucose over 400. Please contact clinic [phone] for instructions on diabetes management and to schedule follow-up within one week with your primary Dr Carrera or MOSES Damico for recommendations on adjust/ management of diabetes.  Your are not immune to Hepatits B and could receive the three shot series.  Hep C test was negative good result.  Cholesterol in good range.    Resulted Orders   Lipid panel reflex to direct LDL Fasting   Result Value Ref Range    Cholesterol 151 <200 mg/dL    Triglycerides 123 <150 mg/dL    Direct Measure HDL 51 >=40 mg/dL    LDL Cholesterol Calculated 75 <=100 mg/dL    Non HDL Cholesterol 100 <130 mg/dL    Patient Fasting > 8hrs? Unknown     Narrative    Cholesterol  Desirable:  <200 mg/dL    Triglycerides  Normal:  Less than 150 mg/dL  Borderline High:  150-199 mg/dL  High:  200-499 mg/dL  Very High:  Greater than or equal to 500 mg/dL    Direct Measure HDL  Female:  Greater than or equal to 50 mg/dL   Male:  Greater than or equal to 40 mg/dL    LDL Cholesterol  Desirable:  <100mg/dL  Above Desirable:  100-129 mg/dL   Borderline High:  130-159 mg/dL   High:  160-189 mg/dL   Very High:  >= 190 mg/dL    Non HDL Cholesterol  Desirable:  130 mg/dL  Above Desirable:  130-159 mg/dL  Borderline High:  160-189 mg/dL  High:  190-219 mg/dL  Very High:  Greater than or equal to 220 mg/dL   Hemoglobin A1c   Result Value Ref Range    Hemoglobin A1C 14.1 (H) <5.7 %      Comment:      Normal <5.7%   Prediabetes 5.7-6.4%    Diabetes 6.5% or higher     Note: Adopted from ADA consensus guidelines.   Comprehensive metabolic panel (BMP + Alb, Alk Phos, ALT, AST, Total. Bili, TP)   Result Value Ref Range    Sodium 135 135 - 145 mmol/L       Comment:      Reference intervals for this test were updated on 09/26/2023 to more accurately reflect our healthy population. There may be differences in the flagging of prior results with similar values performed with this method. Interpretation of those prior results can be made in the context of the updated reference intervals.     Potassium 3.9 3.4 - 5.3 mmol/L    Carbon Dioxide (CO2) 26 22 - 29 mmol/L    Anion Gap 11 7 - 15 mmol/L    Urea Nitrogen 10.1 6.0 - 20.0 mg/dL    Creatinine 0.71 0.67 - 1.17 mg/dL    GFR Estimate >90 >60 mL/min/1.73m2    Calcium 9.6 8.6 - 10.0 mg/dL    Chloride 98 98 - 107 mmol/L    Glucose 420 (H) 70 - 99 mg/dL    Alkaline Phosphatase 89 40 - 150 U/L      Comment:      Reference intervals for this test were updated on 11/14/2023 to more accurately reflect our healthy population. There may be differences in the flagging of prior results with similar values performed with this method. Interpretation of those prior results can be made in the context of the updated reference intervals.    AST 23 0 - 45 U/L      Comment:      Reference intervals for this test were updated on 6/12/2023 to more accurately reflect our healthy population. There may be differences in the flagging of prior results with similar values performed with this method. Interpretation of those prior results can be made in the context of the updated reference intervals.    ALT 26 0 - 70 U/L      Comment:      Reference intervals for this test were updated on 6/12/2023 to more accurately reflect our healthy population. There may be differences in the flagging of prior results with similar values performed with this method. Interpretation of those prior results can be made in the context of the updated reference intervals.      Protein Total 7.6 6.4 - 8.3 g/dL    Albumin 4.3 3.5 - 5.2 g/dL    Bilirubin Total 0.8 <=1.2 mg/dL   Hepatitis B Surface Antibody   Result Value Ref Range    Hepatitis B Surface Antibody Nonreactive        Comment:      Nonreactive results, defined as anti-HBs levels of less than 8.5 mIU/mL, indicate a lack of recovery from acute or chronic hepatitis B or inadequate immune response to HBV vaccination.    Hepatitis B Surface Antibody Instrument Value <3.50 <8.5 m[IU]/mL   Hepatitis B surface antigen   Result Value Ref Range    Hepatitis B Surface Antigen Nonreactive Nonreactive   Hepatitis C Screen Reflex to HCV RNA Quant and Genotype   Result Value Ref Range    Hepatitis C Antibody Nonreactive Nonreactive      Comment:      A nonreactive screening test result does not exclude the possibility of exposure to or infection with HCV. Nonreactive screening test results in individuals with prior exposure to HCV may be due to antibody levels below the limit of detection of this assay or lack of reactivity to the HCV antigens used in this assay. Patients with recent HCV infections (<3 months from time of exposure) may have false-negative HCV antibody results due to the time needed for seroconversion (average of 8 to 9 weeks).       If you have any questions or concerns, please call the clinic at the number listed above.       Sincerely,      Aditya Jett MD
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
## 2024-01-25 ENCOUNTER — TELEPHONE (OUTPATIENT)
Dept: FAMILY MEDICINE | Facility: CLINIC | Age: 54
End: 2024-01-25
Payer: COMMERCIAL

## 2024-01-25 LAB
ATRIAL RATE - MUSE: 111 BPM
CREAT UR-MCNC: 42.1 MG/DL
DIASTOLIC BLOOD PRESSURE - MUSE: NORMAL MMHG
HBV SURFACE AB SERPL IA-ACNC: <3.5 M[IU]/ML
HBV SURFACE AB SERPL IA-ACNC: NONREACTIVE M[IU]/ML
HBV SURFACE AG SERPL QL IA: NONREACTIVE
HCV AB SERPL QL IA: NONREACTIVE
HIV 1+2 AB+HIV1 P24 AG SERPL QL IA: NONREACTIVE
INTERPRETATION ECG - MUSE: NORMAL
MICROALBUMIN UR-MCNC: 12.5 MG/L
MICROALBUMIN/CREAT UR: 29.69 MG/G CR (ref 0–17)
P AXIS - MUSE: 68 DEGREES
PR INTERVAL - MUSE: 132 MS
QRS DURATION - MUSE: 96 MS
QT - MUSE: 332 MS
QTC - MUSE: 451 MS
R AXIS - MUSE: 67 DEGREES
SYSTOLIC BLOOD PRESSURE - MUSE: NORMAL MMHG
T AXIS - MUSE: 79 DEGREES
VENTRICULAR RATE- MUSE: 111 BPM

## 2024-01-25 RX ORDER — INSULIN GLARGINE-YFGN 100 [IU]/ML
18 INJECTION, SOLUTION SUBCUTANEOUS DAILY
Qty: 15 ML | Refills: 4 | Status: SHIPPED | OUTPATIENT
Start: 2024-01-25 | End: 2024-02-12
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
## 2024-01-25 NOTE — RESULT ENCOUNTER NOTE
Results from 1/24/24 reviewed ordered under my name but seen by MOSES Damico. Diabetes uncontrolled A1C 14.1 glucose over 400. Please contact clinic [phone] for instructions on diabetes management and to schedule follow-up within one week with your primary Dr Carrera or MOSES Damico for recommendations on adjust/ management of diabetes.  Your are not immune to Hepatits B and could receive the three shot series.  Hep C test was negative good result.  Cholesterol in good range.  Aditya Jett MD

## 2024-01-29 ENCOUNTER — OFFICE VISIT (OUTPATIENT)
Dept: INTERNAL MEDICINE | Facility: CLINIC | Age: 54
End: 2024-01-29
Payer: COMMERCIAL

## 2024-01-29 VITALS
WEIGHT: 149.6 LBS | DIASTOLIC BLOOD PRESSURE: 50 MMHG | HEART RATE: 100 BPM | OXYGEN SATURATION: 96 % | HEIGHT: 71 IN | SYSTOLIC BLOOD PRESSURE: 128 MMHG | BODY MASS INDEX: 20.94 KG/M2 | RESPIRATION RATE: 12 BRPM | TEMPERATURE: 98.2 F

## 2024-01-29 DIAGNOSIS — Z79.4 TYPE 2 DIABETES MELLITUS WITH HYPERGLYCEMIA, WITH LONG-TERM CURRENT USE OF INSULIN (H): ICD-10-CM

## 2024-01-29 DIAGNOSIS — R01.1 HEART MURMUR: ICD-10-CM

## 2024-01-29 DIAGNOSIS — M54.50 ACUTE LEFT-SIDED LOW BACK PAIN WITHOUT SCIATICA: Primary | ICD-10-CM

## 2024-01-29 DIAGNOSIS — M25.512 ACUTE PAIN OF LEFT SHOULDER: ICD-10-CM

## 2024-01-29 DIAGNOSIS — E11.65 TYPE 2 DIABETES MELLITUS WITH HYPERGLYCEMIA, WITH LONG-TERM CURRENT USE OF INSULIN (H): ICD-10-CM

## 2024-01-29 PROCEDURE — 99214 OFFICE O/P EST MOD 30 MIN: CPT | Performed by: INTERNAL MEDICINE

## 2024-01-29 ASSESSMENT — PAIN SCALES - GENERAL
PAINLEVEL: EXTREME PAIN (9)
PAINLEVEL: WORST PAIN (10)
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
## 2024-01-29 NOTE — PATIENT INSTRUCTIONS
Please call cardiology to schedule the heart ultrasound at [phone].     Ibuprofen and Acetaminophen can be alternated for pain.  Here are the max dosing for these medications:    Acetaminophen (Tylenol) 1000mg every 6 hours with food (Maximum of 4000mg/day)   Ibuprofen (Advil) maximum of 800mg three times a day with food  or 400mg every 4-6 hours.      Check the discharge pharmacy for the tizanidine muscle relaxer.      Increase the insulin to 18 units daily.
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
## 2024-01-29 NOTE — LETTER
[facility] INTERNAL MEDICINE [address]  4TH FLOOR  [facility] [address]  Phone: [phone]  Fax: [phone]    January 29, 2024        Otto Hughes  [address] [facility] [address]          To whom it may concern:    RE: Otto Hughes    Patient was seen and treated today at our clinic.  He should continue to avoid all lifting and bending.  He is therefore only able to do sedentary work at this this time.  If not able to accomdate, he should remain off work.  Restrictions are in effect for two weeks, if improved can return to work on 2/12/24.     Please contact me for questions or concerns.      Sincerely,        Mark Vargas MD

## 2024-01-30 ENCOUNTER — DOCUMENTATION ONLY (OUTPATIENT)
Dept: INTERNAL MEDICINE | Facility: CLINIC | Age: 54
End: 2024-01-30
Payer: COMMERCIAL

## 2024-01-30 NOTE — PROGRESS NOTES
Type of Form Received:     Form Received (Date) 1/29/24   Form Filled out Yes, date 1/29/24   Placed in provider folder No

## 2024-02-06 ENCOUNTER — THERAPY VISIT (OUTPATIENT)
Dept: PHYSICAL THERAPY | Facility: REHABILITATION | Age: 54
End: 2024-02-06
Payer: COMMERCIAL

## 2024-02-06 ENCOUNTER — TELEPHONE (OUTPATIENT)
Dept: INTERNAL MEDICINE | Facility: CLINIC | Age: 54
End: 2024-02-06
Payer: COMMERCIAL

## 2024-02-06 DIAGNOSIS — M54.50 ACUTE LEFT-SIDED LOW BACK PAIN WITHOUT SCIATICA: ICD-10-CM

## 2024-02-06 DIAGNOSIS — M25.512 ACUTE PAIN OF LEFT SHOULDER: ICD-10-CM

## 2024-02-06 PROCEDURE — 97162 PT EVAL MOD COMPLEX 30 MIN: CPT | Mod: GP | Performed by: PHYSICAL THERAPIST

## 2024-02-06 PROCEDURE — 97110 THERAPEUTIC EXERCISES: CPT | Mod: GP | Performed by: PHYSICAL THERAPIST

## 2024-02-06 NOTE — TELEPHONE ENCOUNTER
MTM referral from: [facility] visit (referral by provider)    MTM referral outreach attempt #2 on February 6, 2024 at 10:01 AM      Outcome: Patient not reachable after several attempts, will route to MTM Pharmacist/Provider as an FYI.  [facility] scheduling number is .  Thank you for the referral.    Use [facility] fv emp for the carrier/Plan on the flowsheet      MTM Practitioner please send patient letter    Susana El - [facility]
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
## 2024-02-10 ENCOUNTER — OFFICE VISIT (OUTPATIENT)
Dept: INTERNAL MEDICINE | Facility: CLINIC | Age: 54
End: 2024-02-10
Payer: COMMERCIAL

## 2024-02-10 ENCOUNTER — HOSPITAL ENCOUNTER (EMERGENCY)
Facility: CLINIC | Age: 54
End: 2024-02-10
Payer: COMMERCIAL

## 2024-02-10 ENCOUNTER — ANCILLARY PROCEDURE (OUTPATIENT)
Dept: GENERAL RADIOLOGY | Facility: CLINIC | Age: 54
End: 2024-02-10
Attending: INTERNAL MEDICINE
Payer: COMMERCIAL

## 2024-02-10 ENCOUNTER — LAB (OUTPATIENT)
Dept: LAB | Facility: CLINIC | Age: 54
End: 2024-02-10
Payer: COMMERCIAL

## 2024-02-10 VITALS
BODY MASS INDEX: 20.83 KG/M2 | DIASTOLIC BLOOD PRESSURE: 74 MMHG | WEIGHT: 148.8 LBS | SYSTOLIC BLOOD PRESSURE: 119 MMHG | HEART RATE: 80 BPM | OXYGEN SATURATION: 100 % | HEIGHT: 71 IN

## 2024-02-10 DIAGNOSIS — M79.662 PAIN OF LEFT LOWER LEG: ICD-10-CM
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
## 2024-02-10 DIAGNOSIS — R01.1 HOLOSYSTOLIC MURMUR: ICD-10-CM

## 2024-02-10 DIAGNOSIS — R01.1 HOLOSYSTOLIC MURMUR: Primary | ICD-10-CM

## 2024-02-10 DIAGNOSIS — M54.42 ACUTE BILATERAL LOW BACK PAIN WITH LEFT-SIDED SCIATICA: ICD-10-CM

## 2024-02-10 LAB
ALBUMIN SERPL BCG-MCNC: 4.3 G/DL (ref 3.5–5.2)
ALP SERPL-CCNC: 81 U/L (ref 40–150)
ALT SERPL W P-5'-P-CCNC: 10 U/L (ref 0–70)
ANION GAP SERPL CALCULATED.3IONS-SCNC: 11 MMOL/L (ref 7–15)
AST SERPL W P-5'-P-CCNC: 13 U/L (ref 0–45)
BILIRUB SERPL-MCNC: 0.6 MG/DL
BUN SERPL-MCNC: 10.1 MG/DL (ref 6–20)
CALCIUM SERPL-MCNC: 9.2 MG/DL (ref 8.6–10)
CHLORIDE SERPL-SCNC: 100 MMOL/L (ref 98–107)
CREAT SERPL-MCNC: 0.61 MG/DL (ref 0.67–1.17)
DEPRECATED HCO3 PLAS-SCNC: 26 MMOL/L (ref 22–29)
EGFRCR SERPLBLD CKD-EPI 2021: >90 ML/MIN/1.73M2
ERYTHROCYTE [DISTWIDTH] IN BLOOD BY AUTOMATED COUNT: 11.6 % (ref 10–15)
GLUCOSE SERPL-MCNC: 317 MG/DL (ref 70–99)
HCT VFR BLD AUTO: 47.8 % (ref 40–53)
HGB BLD-MCNC: 16.3 G/DL (ref 13.3–17.7)
MCH RBC QN AUTO: 30.2 PG (ref 26.5–33)
MCHC RBC AUTO-ENTMCNC: 34.1 G/DL (ref 31.5–36.5)
MCV RBC AUTO: 89 FL (ref 78–100)
NT-PROBNP SERPL-MCNC: 65 PG/ML (ref 0–900)
PLATELET # BLD AUTO: 325 10E3/UL (ref 150–450)
POTASSIUM SERPL-SCNC: 3.6 MMOL/L (ref 3.4–5.3)
PROT SERPL-MCNC: 7.6 G/DL (ref 6.4–8.3)
RBC # BLD AUTO: 5.4 10E6/UL (ref 4.4–5.9)
SODIUM SERPL-SCNC: 137 MMOL/L (ref 135–145)
TROPONIN T SERPL HS-MCNC: 8 NG/L
WBC # BLD AUTO: 6.3 10E3/UL (ref 4–11)

## 2024-02-10 PROCEDURE — 72100 X-RAY EXAM L-S SPINE 2/3 VWS: CPT | Performed by: STUDENT IN AN ORGANIZED HEALTH CARE EDUCATION/TRAINING PROGRAM

## 2024-02-10 PROCEDURE — 85027 COMPLETE CBC AUTOMATED: CPT | Performed by: PATHOLOGY

## 2024-02-10 PROCEDURE — 99215 OFFICE O/P EST HI 40 MIN: CPT | Mod: 25 | Performed by: INTERNAL MEDICINE

## 2024-02-10 PROCEDURE — 73590 X-RAY EXAM OF LOWER LEG: CPT | Mod: LT | Performed by: RADIOLOGY

## 2024-02-10 PROCEDURE — 83880 ASSAY OF NATRIURETIC PEPTIDE: CPT | Performed by: PATHOLOGY

## 2024-02-10 PROCEDURE — 80053 COMPREHEN METABOLIC PANEL: CPT | Performed by: PATHOLOGY

## 2024-02-10 PROCEDURE — 84484 ASSAY OF TROPONIN QUANT: CPT | Performed by: PATHOLOGY

## 2024-02-10 PROCEDURE — 36415 COLL VENOUS BLD VENIPUNCTURE: CPT | Performed by: PATHOLOGY

## 2024-02-10 PROCEDURE — 93000 ELECTROCARDIOGRAM COMPLETE: CPT | Performed by: INTERNAL MEDICINE

## 2024-02-10 PROCEDURE — 99417 PROLNG OP E/M EACH 15 MIN: CPT | Performed by: INTERNAL MEDICINE

## 2024-02-10 NOTE — ED NOTES
Expected Patient Referral to ED  9:40 AM    Referring Clinic/Provider:  Dr. Johnson internal medicine pediatrics [facility]    Reason for referral/Clinical facts:  Patient has new holosystolic murmur with flipped T waves.  No chest pain.  Concern about traumatic mitral regurgitation.  Patient is hemodynamically stable    Recommendations provided:  Send to ED for further evaluation    Caller was informed that this institution does possess the capabilities and/or resources to provide for patient and should be transferred to our facility.  Discussed that stat echocardiogram would need to be approved by cardiology and sometimes on-call cardiology is not able to do stat echo reads.    Discussed if patient is already on [facility] might be more beneficial to go to a closer ER that is more tertiary care such as [facility]    Discussed that if direct admit is sought and any hurdles are encountered, this ED would be happy to see the patient and evaluate.    Informed caller that recommendations provided are recommendations based only on the facts provided and that they responsible to accept or reject the advice, or to seek a formal in person consultation as needed and that this ED will see/treat patient should they arrive.      Gregorio Heaton MD  [facility] EMERGENCY ROOM  [address] [phone]       Gregorio Heaton MD  02/10/24 0995
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
## 2024-02-12 ENCOUNTER — OFFICE VISIT (OUTPATIENT)
Dept: INTERNAL MEDICINE | Facility: CLINIC | Age: 54
End: 2024-02-12
Payer: COMMERCIAL

## 2024-02-12 VITALS
SYSTOLIC BLOOD PRESSURE: 146 MMHG | BODY MASS INDEX: 20.85 KG/M2 | DIASTOLIC BLOOD PRESSURE: 78 MMHG | HEART RATE: 92 BPM | OXYGEN SATURATION: 98 % | WEIGHT: 149.4 LBS

## 2024-02-12 DIAGNOSIS — R01.1 HOLOSYSTOLIC MURMUR: ICD-10-CM

## 2024-02-12 DIAGNOSIS — E11.9 T2DM (TYPE 2 DIABETES MELLITUS) (H): Primary | ICD-10-CM

## 2024-02-12 DIAGNOSIS — E11.65 TYPE 2 DIABETES MELLITUS WITH HYPERGLYCEMIA, WITH LONG-TERM CURRENT USE OF INSULIN (H): ICD-10-CM

## 2024-02-12 DIAGNOSIS — Z79.4 TYPE 2 DIABETES MELLITUS WITH HYPERGLYCEMIA, WITH LONG-TERM CURRENT USE OF INSULIN (H): ICD-10-CM

## 2024-02-12 LAB
ATRIAL RATE - MUSE: 72 BPM
DIASTOLIC BLOOD PRESSURE - MUSE: NORMAL MMHG
INTERPRETATION ECG - MUSE: NORMAL
P AXIS - MUSE: 60 DEGREES
PR INTERVAL - MUSE: 172 MS
QRS DURATION - MUSE: 102 MS
QT - MUSE: 412 MS
QTC - MUSE: 451 MS
R AXIS - MUSE: 72 DEGREES
SYSTOLIC BLOOD PRESSURE - MUSE: NORMAL MMHG
T AXIS - MUSE: 87 DEGREES
VENTRICULAR RATE- MUSE: 72 BPM

## 2024-02-12 PROCEDURE — 99214 OFFICE O/P EST MOD 30 MIN: CPT | Mod: 25 | Performed by: INTERNAL MEDICINE

## 2024-02-12 PROCEDURE — 90480 ADMN SARSCOV2 VAC 1/ONLY CMP: CPT | Performed by: INTERNAL MEDICINE

## 2024-02-12 PROCEDURE — 90471 IMMUNIZATION ADMIN: CPT | Performed by: INTERNAL MEDICINE

## 2024-02-12 PROCEDURE — 90746 HEPB VACCINE 3 DOSE ADULT IM: CPT | Performed by: INTERNAL MEDICINE

## 2024-02-12 PROCEDURE — 91320 SARSCV2 VAC 30MCG TRS-SUC IM: CPT | Performed by: INTERNAL MEDICINE

## 2024-02-12 RX ORDER — INSULIN GLARGINE-YFGN 100 [IU]/ML
20 INJECTION, SOLUTION SUBCUTANEOUS DAILY
COMMUNITY
Start: 2024-02-12 | End: 2024-05-06
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
## 2024-02-12 NOTE — PROGRESS NOTES
Otto is a 53 year old that presents in clinic today for the following:     Chief Complaint   Patient presents with    Follow Up           2/12/2024     2:05 PM   Additional Questions   Roomed by YW     Screenings from encounters over the past 10 days    No data recorded       ARBEN Phelps at 2:08 PM on 2/12/2024          Administered Hepatitis B and Covid Pfizer [id number] vaccine (see Immunizations in Chart Review). Patient tolerated well. Advised to sit in lobby for 15 minutes prior to leaving.     Hepatitis B, Adult vaccine  LOT: [id number]   NDC: 19612-729-83   Site: left deltoid    Covid Pfizer COMIRNATY [id number] vaccine   LOT: [id number]   ND: 1195-2046-74   Site: right deltoid      Vaccines administered by Frank BARRETO CMA ([facility]) at 2:49 PM on 2/12/2024

## 2024-02-13 ENCOUNTER — OFFICE VISIT (OUTPATIENT)
Dept: FAMILY MEDICINE | Facility: CLINIC | Age: 54
End: 2024-02-13
Payer: COMMERCIAL

## 2024-02-13 VITALS
WEIGHT: 149.8 LBS | BODY MASS INDEX: 20.97 KG/M2 | SYSTOLIC BLOOD PRESSURE: 120 MMHG | HEART RATE: 95 BPM | HEIGHT: 71 IN | DIASTOLIC BLOOD PRESSURE: 70 MMHG | OXYGEN SATURATION: 97 %

## 2024-02-13 DIAGNOSIS — E11.65 UNCONTROLLED TYPE 2 DIABETES MELLITUS WITH HYPERGLYCEMIA (H): Primary | ICD-10-CM

## 2024-02-13 DIAGNOSIS — M79.605 PAIN OF LEFT LOWER EXTREMITY: ICD-10-CM

## 2024-02-13 DIAGNOSIS — M54.50 LOW BACK PAIN, UNSPECIFIED BACK PAIN LATERALITY, UNSPECIFIED CHRONICITY, UNSPECIFIED WHETHER SCIATICA PRESENT: ICD-10-CM

## 2024-02-13 DIAGNOSIS — N52.8 OTHER MALE ERECTILE DYSFUNCTION: ICD-10-CM

## 2024-02-13 DIAGNOSIS — M79.605 PAIN OF LEFT LOWER EXTREMITY: Primary | ICD-10-CM

## 2024-02-13 PROCEDURE — 99213 OFFICE O/P EST LOW 20 MIN: CPT | Performed by: FAMILY MEDICINE

## 2024-02-13 PROCEDURE — 99215 OFFICE O/P EST HI 40 MIN: CPT | Performed by: FAMILY MEDICINE

## 2024-02-13 RX ORDER — TADALAFIL 20 MG/1
20 TABLET ORAL EVERY 24 HOURS
Qty: 13 TABLET | Refills: 11 | Status: SHIPPED | OUTPATIENT
Start: 2024-02-13
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
## 2024-02-16 ENCOUNTER — TELEPHONE (OUTPATIENT)
Dept: FAMILY MEDICINE | Facility: CLINIC | Age: 54
End: 2024-02-16
Payer: COMMERCIAL

## 2024-02-16 NOTE — TELEPHONE ENCOUNTER
MTM referral from: [facility] visit (referral by provider)    MTM referral outreach attempt #2 on February 16, 2024 at 11:29 AM      Outcome: Patient not reachable after several attempts, will route to [facility] Pharmacist/Provider as an FYI.  [facility] scheduling number is .  Thank you for the referral.    Use [employer] employee for the carrier/Plan on the flowsheet      MT Practitioner please send patient letter    See Mark  [facility]   [phone]

## 2024-02-19 DIAGNOSIS — R52 WHOLE BODY PAIN: ICD-10-CM
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
## 2024-02-20 ENCOUNTER — THERAPY VISIT (OUTPATIENT)
Dept: PHYSICAL THERAPY | Facility: REHABILITATION | Age: 54
End: 2024-02-20
Payer: COMMERCIAL

## 2024-02-20 DIAGNOSIS — M25.512 ACUTE PAIN OF LEFT SHOULDER: ICD-10-CM

## 2024-02-20 DIAGNOSIS — M54.50 ACUTE LEFT-SIDED LOW BACK PAIN WITHOUT SCIATICA: Primary | ICD-10-CM

## 2024-02-20 PROCEDURE — 97110 THERAPEUTIC EXERCISES: CPT | Mod: GP | Performed by: PHYSICAL THERAPIST
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
## 2024-02-22 NOTE — TELEPHONE ENCOUNTER
tiZANidine (ZANAFLEX) 2 MG tablet 20 tablet 0 1/24/2024     ----------------------  Last Office Visit : 2/13/2024  [facility] [place]  Antelmo Carrera MD     Future Office visit:    3/12/2024 Status: [facility]    Arrive By: 9:15 AM     Appointment Time: 9:30 AM Length: 30   Visit Type: UMP RETURN [[id number]] ZULY:     Provider: Antelmo Carrera MD Department: [facility]     ----------------------      Routing refill request to provider for review/approval because:  Not on Protocol

## 2024-02-23 ENCOUNTER — HOSPITAL ENCOUNTER (OUTPATIENT)
Dept: CARDIOLOGY | Facility: CLINIC | Age: 54
Discharge: HOME | End: 2024-02-23
Attending: INTERNAL MEDICINE | Admitting: INTERNAL MEDICINE
Payer: COMMERCIAL

## 2024-02-23 ENCOUNTER — HOSPITAL ENCOUNTER (EMERGENCY)
Facility: CLINIC | Age: 54
End: 2024-02-23
Payer: COMMERCIAL

## 2024-02-23 DIAGNOSIS — R01.1 HOLOSYSTOLIC MURMUR: ICD-10-CM

## 2024-02-23 LAB — LVEF ECHO: NORMAL

## 2024-02-23 PROCEDURE — 93306 TTE W/DOPPLER COMPLETE: CPT | Mod: 26 | Performed by: INTERNAL MEDICINE

## 2024-02-23 PROCEDURE — 93306 TTE W/DOPPLER COMPLETE: CPT

## 2024-02-23 RX ORDER — TIZANIDINE 2 MG/1
2 TABLET ORAL 2 TIMES DAILY PRN
Qty: 20 TABLET | Refills: 0 | Status: SHIPPED | OUTPATIENT
Start: 2024-02-23 | End: 2024-07-09

## 2024-02-23 NOTE — LETTER
2024      Otto Liu  [address] [facility] [address]        Dear ,    We are writing to inform you of your test results.  We will need to get some blood tests and have you see our cardiology team to assess the new heart murmur.   Resulted Orders   Echocardiogram Complete   Result Value Ref Range    LVEF  60-65%     Narrative    [id number]  [id number]  [id number]  [id number]^LAURA^ASHLY^TOSIN     [facility],[place]  Echocardiography Laboratory  [address] [place] [id number]     Name: OTTO LIU  MRN: [id number]  : 1970  Study Date: 2024 03:44 PM  Age: 53 yrs  Gender: Male  Patient Location: [facility]  Reason For Study: Holosystolic murmur  Ordering Physician: ASLHY SANCHEZ  Referring Physician: ASHLY SANCHEZ  Performed By: Lauren Cruz     BSA: 1.8 m2  Height: 70 in  Weight: 149 lb  HR: 88  BP: 120/70 mmHg  ______________________________________________________________________________  Procedure  Echocardiogram with two-dimensional, color and spectral Doppler performed.  ______________________________________________________________________________  Interpretation Summary  Myxomatous mitral valve with A2/P2 prolapse and severe anteriorly directed  MR.Consider JANES.  Mild aortic insufficiency is present.  Ascending aorta 4.2 cm.  Ascending Aorta dilatation is present.  There is no prior study for direct comparison.  ______________________________________________________________________________  Left Ventricle  Global and regional left ventricular function is normal with an EF of 60-65%.  Left ventricular wall thickness is normal. Left ventricular size is normal.  Diastolic function not assessed due to significant valvular regurgitation. No  regional wall motion abnormalities are seen.     Right Ventricle  Right ventricular function, chamber size, wall motion, and thickness are  normal.     Atria  Both atria appear  normal.     Mitral Valve  Myxomatous mitral valve with A2/P2 prolapse and severe anteriorly directed  MR.Consider JANES.     Aortic Valve  The aortic valve is tricuspid. Mild aortic insufficiency is present.     Tricuspid Valve  The tricuspid valve is normal. Pulmonary artery systolic pressure cannot be  assessed.     Pulmonic Valve  The pulmonic valve is normal.     Vessels  The pulmonary artery and bifurcation cannot be assessed. The inferior vena  cava is normal. Ascending Aorta dilatation is present. Ascending aorta 4.2 cm.     Pericardium  No pericardial effusion is present.     Compared to Previous Study  There is no prior study for direct comparison.  ______________________________________________________________________________  MMode/2D Measurements & Calculations  IVSd: 1.3 cm     LVIDd: 4.8 cm  LVIDs: 2.7 cm  LVPWd: 1.2 cm  FS: 44.6 %  LV mass(C)d: 227.9 grams  LV mass(C)dI: 123.8 grams/m2  Ao root diam: 3.5 cm  asc Aorta Diam: 4.2 cm  LVOT diam: 2.0 cm  LVOT area: 3.2 cm2  Ao root diam index Ht(cm/m): 2.0  Ao root diam index BSA (cm/m2): 1.9  Asc Ao diam index BSA (cm/m2): 2.3  Asc Ao diam index Ht(cm/m): 2.3  LA Volume (BP): 55.5 ml     LA Volume Index (BP): 30.2 ml/m2  RV Base: 3.1 cm  RWT: 0.49  TAPSE: 2.1 cm     Doppler Measurements & Calculations  MV E max juventino: 119.0 cm/sec  MV A max juventino: 112.1 cm/sec  MV E/A: 1.1  MV dec slope: 793.8 cm/sec2  MV dec time: 0.15 sec  Ao V2 max: 128.3 cm/sec  Ao max P.6 mmHg  Ao V2 mean: 92.2 cm/sec  Ao mean PG: 3.7 mmHg  Ao V2 VTI: 25.1 cm  YASEMIN(I,D): 2.6 cm2  YASEMIN(V,D): 2.7 cm2  AI P1/2t: 294.3 msec  LV V1 max P.9 mmHg  LV V1 max: 110.6 cm/sec  LV V1 VTI: 20.9 cm  SV(LVOT): 66.2 ml  SI(LVOT): 35.9 ml/m2  PA acc time: 0.08 sec  PI end-d juventino: 160.4 cm/sec  AV Juventino Ratio (DI): 0.86     YASEMIN Index (cm2/m2): 1.4  E/E' avg: 10.2  Lateral E/e': 6.8  Medial E/e': 13.5  RV S Juventino: 15.8 cm/sec      ______________________________________________________________________________  Report approved by: Alexia Vizcaino 02/23/2024 04:46 PM             Tl Johnson MD

## 2024-02-26 DIAGNOSIS — R01.1 HEART MURMUR: Primary | ICD-10-CM

## 2024-02-26 DIAGNOSIS — I34.0 MITRAL REGURGITATION DUE TO CUSP PROLAPSE: ICD-10-CM

## 2024-02-26 DIAGNOSIS — I34.1 MITRAL REGURGITATION DUE TO CUSP PROLAPSE: ICD-10-CM

## 2024-02-26 NOTE — RESULT ENCOUNTER NOTE
Hi Mr. Hughes - we will need to get some blood tests and have you see our cardiology team to assess the new heart murmur. Dr. Johnson.

## 2024-02-27 ENCOUNTER — THERAPY VISIT (OUTPATIENT)
Dept: PHYSICAL THERAPY | Facility: REHABILITATION | Age: 54
End: 2024-02-27
Payer: COMMERCIAL

## 2024-02-27 DIAGNOSIS — M54.50 ACUTE LEFT-SIDED LOW BACK PAIN WITHOUT SCIATICA: Primary | ICD-10-CM

## 2024-02-27 DIAGNOSIS — M25.512 ACUTE PAIN OF LEFT SHOULDER: ICD-10-CM

## 2024-02-27 PROCEDURE — 97110 THERAPEUTIC EXERCISES: CPT | Mod: GP | Performed by: PHYSICAL THERAPIST

## 2024-03-06 ENCOUNTER — TELEPHONE (OUTPATIENT)
Dept: ENDOCRINOLOGY | Facility: CLINIC | Age: 54
End: 2024-03-06
Payer: COMMERCIAL
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
## 2024-03-11 ENCOUNTER — TELEPHONE (OUTPATIENT)
Dept: PHYSICAL THERAPY | Facility: REHABILITATION | Age: 54
End: 2024-03-11
Payer: COMMERCIAL

## 2024-03-11 NOTE — TELEPHONE ENCOUNTER
Called and left VM with pt regarding missed PT session today at 4:00pm. Reminded pt of his next visit on 3/18/2024 at 4:00pm and asked pt to call and confirm.     Susana Lyons, PT

## 2024-03-12 ENCOUNTER — OFFICE VISIT (OUTPATIENT)
Dept: FAMILY MEDICINE | Facility: CLINIC | Age: 54
End: 2024-03-12
Payer: COMMERCIAL

## 2024-03-12 VITALS
BODY MASS INDEX: 21.14 KG/M2 | OXYGEN SATURATION: 100 % | SYSTOLIC BLOOD PRESSURE: 129 MMHG | DIASTOLIC BLOOD PRESSURE: 77 MMHG | HEIGHT: 71 IN | HEART RATE: 75 BPM | WEIGHT: 151 LBS

## 2024-03-12 DIAGNOSIS — E11.65 UNCONTROLLED TYPE 2 DIABETES MELLITUS WITH HYPERGLYCEMIA (H): Primary | ICD-10-CM

## 2024-03-12 DIAGNOSIS — R93.1 ECHOCARDIOGRAM ABNORMAL: ICD-10-CM

## 2024-03-12 DIAGNOSIS — N52.8 OTHER MALE ERECTILE DYSFUNCTION: ICD-10-CM

## 2024-03-12 PROCEDURE — 99214 OFFICE O/P EST MOD 30 MIN: CPT | Performed by: FAMILY MEDICINE

## 2024-03-12 NOTE — PROGRESS NOTES
Not all questionnaires were able to be completed at this visit.      Assessment & Plan     Uncontrolled type 2 diabetes mellitus with hyperglycemia (H)  Discussed he will work w/ MTM pending Endo summer visit  - Med Therapy Management Referral  Pt defers covid shot    Echocardiogram abnormal  Discussed, he'll see cardio   - Adult Cardiology Tasha Sarmiento Referral; Future    Other male erectile dysfunction  Cont prn Viagra, helps/tolerated      31 minutes spent by me on the date of the encounter doing chart review, history and exam, documentation and further activities per the note          No follow-ups on file.    Allyson Menjivar is a 54 year old, presenting for the following health issues:  Follow Up      3/12/2024    10:05 AM   Additional Questions   Roomed by MVO, EMT     HPI   Pt notes still doing PT for back/leg pain   DM: he did not connect w/ MTM for poorly controlled DM but is willing to do so he states, referral again placed; does see eye MD soon  ED: Viagra works better than Cialis, tolerated   Abnormal echocardiogram reviewed: mitral valve abnormality, he has cardio consult but no appt set up. Discussed evaluation and treatment and symptoms. Mild dyspnea at times on close questioning but no clear cardiac symptoms.   Past Medical History:   Diagnosis Date    Hyperlipidemia LDL goal < 100     T2DM (type 2 diabetes mellitus) (H)      Past Surgical History:   Procedure Laterality Date    COLONOSCOPY N/A 12/9/2022    Procedure: COLONOSCOPY;  Surgeon: Amisha Bailey MD;  Location: [facility] OR    NO HISTORY OF SURGERY       Current Outpatient Medications   Medication    bisacodyl (DULCOLAX) 5 MG EC tablet    blood glucose (FREESTYLE LITE) test strip    blood glucose monitoring (FREESTYLE) lancets    blood glucose monitoring (NO BRAND SPECIFIED) meter device kit    Blood Pressure Monitoring (BLOOD PRESSURE KIT) KIT    chlorhexidine (PERIDEX) 0.12 % solution    Insulin Glargine-yfgn (SEMGLEE, YFGN,) 100 UNIT/ML  SOPN    insulin pen needle (31G X 5 MM) 31G X 5 MM miscellaneous    insulin pen needle (BD MO U/F) 32G X 4 MM miscellaneous    insulin pen needle 31G X 8 MM    lisinopril (ZESTRIL) 10 MG tablet    metFORMIN (GLUCOPHAGE XR) 500 MG 24 hr tablet    Multiple Vitamin (MULTIVITAMIN ADULT PO)    order for DME    polyethylene glycol (GOLYTELY) 236 g suspension    Semaglutide (RYBELSUS) 3 MG tablet    sildenafil (VIAGRA) 100 MG tablet    simvastatin (ZOCOR) 20 MG tablet    tadalafil (ADCIRCA/CIALIS) 20 MG tablet    tiZANidine (ZANAFLEX) 2 MG tablet     No current facility-administered medications for this visit.     No Known Allergies              Objective    There were no vitals taken for this visit.  There is no height or weight on file to calculate BMI.  Physical Exam   GENERAL: alert and no distress  MS: no gross musculoskeletal defects noted, no edema          Signed Electronically by: Antelmo Carrera MD

## 2024-03-13 ENCOUNTER — TELEPHONE (OUTPATIENT)
Dept: ENDOCRINOLOGY | Facility: CLINIC | Age: 54
End: 2024-03-13
Payer: COMMERCIAL
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
## 2024-03-20 ENCOUNTER — TELEPHONE (OUTPATIENT)
Dept: FAMILY MEDICINE | Facility: CLINIC | Age: 54
End: 2024-03-20
Payer: COMMERCIAL

## 2024-03-20 NOTE — LETTER
March 21, 2024      Otto Hughes  [address] [facility] [address]        Dear Otto,           It has recommended you schedule a Medication Therapy Management (MTM) appointment. MTM is designed to help you get the most of out of your medicines.     During an MTM appointment a specially trained pharmacist will review all of your medicines, both prescription and over-the-counter. They will make sure your medicines are the best choice for you and are safe and convenient for you.  MTM pharmacists work together with you and your doctor to help you understand your medicines, solve any problems related to your medicines and help you get the best results from taking your medicines.     At [facility], we strongly believe in a team approach to health care. We want to help you understand your medicines and health conditions. To learn more about how you might benefit from MTM services, check out the following website:    [email or web address]    To make an appointment, please call the MTM scheduling line at [phone] (toll-free at [phone]).    We look forward to hearing from you!        Rachelle Hwang, PharmD  Medication Therapy Management Pharmacist   [facility]

## 2024-03-20 NOTE — TELEPHONE ENCOUNTER
MTM referral from: [facility]] visit (referral by provider)    MTM referral outreach attempt #2 on March 20, 2024 at 10:04 AM      Outcome: Patient not reachable after several attempts, will route to MTM Pharmacist/Provider as an FYI.  [facility] scheduling number is .  Thank you for the referral.    [facility]  for the [facility]/Plan on the flowsheet       MTM Practitioner please send patient letter    Jesse Dougherty  [facility]

## 2024-03-21 ENCOUNTER — OFFICE VISIT (OUTPATIENT)
Dept: INTERNAL MEDICINE | Facility: CLINIC | Age: 54
End: 2024-03-21
Payer: COMMERCIAL

## 2024-03-21 VITALS
OXYGEN SATURATION: 100 % | SYSTOLIC BLOOD PRESSURE: 131 MMHG | WEIGHT: 147.4 LBS | HEART RATE: 81 BPM | DIASTOLIC BLOOD PRESSURE: 81 MMHG | BODY MASS INDEX: 20.57 KG/M2

## 2024-03-21 DIAGNOSIS — I34.0 MYXOMATOUS MITRAL VALVE REGURGITATION: Primary | ICD-10-CM

## 2024-03-21 PROCEDURE — 99417 PROLNG OP E/M EACH 15 MIN: CPT | Performed by: NURSE PRACTITIONER

## 2024-03-21 PROCEDURE — 99215 OFFICE O/P EST HI 40 MIN: CPT | Performed by: NURSE PRACTITIONER

## 2024-03-21 NOTE — TELEPHONE ENCOUNTER
Letter sent.     Rachelle Hwang, PharmD  Medication Therapy Management Pharmacist   [facility]
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
## 2024-03-21 NOTE — LETTER
Work Type:  Sedentary Work: Lifting 30 pounds maximum and occasionally lifting and carrying small objects and tools.  This restriction may include occasional walking and standing if required     In an 8 hour work day patient may:  a. stand/walk: Up to 8 hrs.  b. sit: Up to 8 hrs.        With regards to :  back and left leg, patient may:  a. bend:  Not at all f. push:  Occasionally  b. twist:  Not at all g. Pull:  Occasionally  c. squat:   occasionally  h: grasp:   frequenty  d. climb:  Not at all i. fine manipulation:  frequently  e. reach:   Repetitively j. foot controls:   Occasionally
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
## 2024-03-26 ENCOUNTER — TELEPHONE (OUTPATIENT)
Dept: FAMILY MEDICINE | Facility: CLINIC | Age: 54
End: 2024-03-26

## 2024-03-26 NOTE — TELEPHONE ENCOUNTER
A voicemail was left stating a new FMLA form is needed to make the requested changes.    Sadia Milan on 3/26/2024 at 9:38 AM

## 2024-03-26 NOTE — TELEPHONE ENCOUNTER
[facility] Call Center    Phone Message    May a detailed message be left on voicemail: yes     Reason for Call: Form or Letter   Type or form/letter needing completion: FMLA - per pt would like if the team can change one thing/ update on his form, to lift or state that pt can work more than 8 hrs a day if he has OT (over time) offered to him.   Provider:   Date form needed: ASAP  Once completed: Fax form to: Elizabeth Hanna at [phone]  Per pt would like if he can also get a copy of the updated FMLA forms once complete. Per pt would like a call once its fax over and when he can come get it also. Per pt work until 2:45 pm. Thank you.   Action Taken: Message routed to:  [facility]    Travel Screening: Not Applicable

## 2024-03-26 NOTE — TELEPHONE ENCOUNTER
RECORDS RECEIVED FROM:    DATE RECEIVED:    NOTES STATUS DETAILS   OFFICE NOTE from referring provider  Internal 3/21/24 - Russ LEON CNP    MEDICATION LIST Care Everywhere    GENERAL CARDIO RECORDS   (ALL APPOINTMENT TYPES)     LABS (CBC,BMP,CMP, TSH) Internal    EKG (STRIPS & REPORTS) Internal 2/10/24   MONITORS (STRIPS & REPORTS) N/A    ECHOS (IMAGES AND REPORTS) Internal 2/23/24    CT/CTA (IMAGES AND REPORTS) Internal 1/24/24

## 2024-04-01 ENCOUNTER — OFFICE VISIT (OUTPATIENT)
Dept: OPHTHALMOLOGY | Facility: CLINIC | Age: 54
End: 2024-04-01
Attending: INTERNAL MEDICINE
Payer: COMMERCIAL

## 2024-04-01 DIAGNOSIS — H35.363 MACULAR DRUSEN, BILATERAL: ICD-10-CM

## 2024-04-01 DIAGNOSIS — H25.13 NUCLEAR SCLEROTIC CATARACT OF BOTH EYES: ICD-10-CM

## 2024-04-01 DIAGNOSIS — H52.203 ASTIGMATISM OF BOTH EYES WITH PRESBYOPIA: ICD-10-CM

## 2024-04-01 DIAGNOSIS — H04.123 DRY EYES, BILATERAL: ICD-10-CM

## 2024-04-01 DIAGNOSIS — E11.9 DIABETES MELLITUS TYPE 2 WITHOUT RETINOPATHY (H): Primary | ICD-10-CM

## 2024-04-01 DIAGNOSIS — H52.4 ASTIGMATISM OF BOTH EYES WITH PRESBYOPIA: ICD-10-CM

## 2024-04-01 PROCEDURE — 99214 OFFICE O/P EST MOD 30 MIN: CPT | Performed by: STUDENT IN AN ORGANIZED HEALTH CARE EDUCATION/TRAINING PROGRAM

## 2024-04-01 PROCEDURE — 92014 COMPRE OPH EXAM EST PT 1/>: CPT | Performed by: STUDENT IN AN ORGANIZED HEALTH CARE EDUCATION/TRAINING PROGRAM

## 2024-04-01 PROCEDURE — 92015 DETERMINE REFRACTIVE STATE: CPT

## 2024-04-01 PROCEDURE — 92134 CPTRZ OPH DX IMG PST SGM RTA: CPT | Performed by: STUDENT IN AN ORGANIZED HEALTH CARE EDUCATION/TRAINING PROGRAM

## 2024-04-01 ASSESSMENT — VISUAL ACUITY
OD_SC: 20/20
OS_SC+: -1
METHOD: SNELLEN - LINEAR
OD_SC+: -2
OS_SC: 20/25

## 2024-04-01 ASSESSMENT — REFRACTION_WEARINGRX
SPECS_TYPE: OTC READERS
OD_SPHERE: +1.75
OS_SPHERE: +1.75
OD_CYLINDER: SPHERE
OS_CYLINDER: SPHERE

## 2024-04-01 ASSESSMENT — REFRACTION_MANIFEST
OS_CYLINDER: +0.50
OD_AXIS: 145
OS_ADD: +2.50
OS_AXIS: 085
OD_ADD: +2.50
OS_SPHERE: PLANO
OD_SPHERE: PLANO
OD_CYLINDER: +0.25

## 2024-04-01 ASSESSMENT — EXTERNAL EXAM - RIGHT EYE: OD_EXAM: NORMAL

## 2024-04-01 ASSESSMENT — CONF VISUAL FIELD
OD_NORMAL: 1
OS_NORMAL: 1
OD_INFERIOR_NASAL_RESTRICTION: 0
OD_INFERIOR_TEMPORAL_RESTRICTION: 0
OS_SUPERIOR_NASAL_RESTRICTION: 0
OS_INFERIOR_NASAL_RESTRICTION: 0
OD_SUPERIOR_NASAL_RESTRICTION: 0
OS_INFERIOR_TEMPORAL_RESTRICTION: 0
OD_SUPERIOR_TEMPORAL_RESTRICTION: 0
OS_SUPERIOR_TEMPORAL_RESTRICTION: 0
METHOD: COUNTING FINGERS

## 2024-04-01 ASSESSMENT — TONOMETRY
IOP_METHOD: TONOPEN
OD_IOP_MMHG: 14
OS_IOP_MMHG: 16

## 2024-04-01 ASSESSMENT — SLIT LAMP EXAM - LIDS
COMMENTS: MGD
COMMENTS: MGD

## 2024-04-01 ASSESSMENT — CUP TO DISC RATIO
OD_RATIO: 0.4
OS_RATIO: 0.45

## 2024-04-01 ASSESSMENT — EXTERNAL EXAM - LEFT EYE: OS_EXAM: NORMAL
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
## 2024-04-01 NOTE — PROGRESS NOTES
"HPI       Diabetic Eye Exam    Associated symptoms include blurred vision, itching (some) and tearing.  Negative for flashes and floaters.  Diabetes characteristics include Type 2, on insulin and taking oral medications.  Duration of years.  Treatments tried include glasses.  Pain was noted as 0/10. Additional comments: 1. Type 2 diabetes mellitus with hyperglycemia, unspecified whether long term insulin use (H)   2. Bilateral nonexudative age-related macular degeneration, unspecified stage   3. Dry eyes, bilateral   4. Nuclear sclerotic cataract of both eyes   5. Presbyopia      Patient states vision is a little more blurry each eye at near over the last couple years. \"I need readers for small print. I use +1.75.\" Denies glare or halos. Patient notes some dryness each eye. No eye drops used.              Comments    DM2  Last BS: 200-210 (currently fasting); BS is normally high  Lab Results       Component                Value               Date                       A1C                      14.1                01/24/2024                 A1C                      10.5                02/15/2022                 A1C                      12.7                11/30/2020                 A1C                      13.4                02/04/2020                 A1C                      11.0                09/17/2018                 A1C                      11.2                11/14/2017                 A1C                      10.2                01/31/2017              VEENA Nelson 2:46 PM 04/01/2024            Last edited by Kylah Clark on 4/1/2024  2:46 PM.          Review of systems for the eyes was negative other than the pertinent positives/negatives listed in the HPI.    Ocular Meds: None    Ocular Hx: AMD OU; Dry Eyes OU; cataract OU    FOHx: no family history of glaucoma or blindness    PMHx: Diabetes, HTN    Assessment & Plan      Otto Hughes is a 54 year old male with the following diagnoses:    T2DM " without Retinopathy OU  - Diagnosed: 2011  - strict BP/BG control  - patient understands importance  of good blood glucose control in order to reduce the risk of developing diabetic retinopathy  - yearly DFE     Flame Hemorrhage left eye  Cotton wool spot left eye  - states BP has been borderline  - advised to check and review with PCP  - further work up and management per primary    Macular pigmentary changes OU  - drusen appearance, but young age  - oct macula with outer retinal changes/fine drusen, no heme  - green leafy vegetables  - Amsler precautions    Nuclear Sclerotic Cataracts OU  - not visually significant  - Observe    Dry Eyes OU  - ATs prn OU  - Warm compresses BID OU x 5-10 min each time    Presbyopia OU  - Uses +1.75 at home and reads well  - Continue with OTC readers, defers MRx     Counseled return/RD precautions  Letter to referring provider    Patient disposition:   Return in about 1 year (around 4/1/2025) for Annual Visit, OCT Macula, or sooner changes.     Attending Physician Attestation:  Complete documentation of historical and exam elements from today's encounter can be found in the full encounter summary report (not reduplicated in this progress note).  I personally obtained the chief complaint(s) and history of present illness.  I confirmed and edited as necessary the review of systems, past medical/surgical history, family history, social history, and examination findings as documented by others; and I examined the patient myself.  I personally reviewed the relevant tests, images, and reports as documented above.  I formulated and edited as necessary the assessment and plan and discussed the findings and management plan with the patient and family. . - Dariana Scales MD

## 2024-04-01 NOTE — NURSING NOTE
"Chief Complaints and History of Present Illnesses   Patient presents with    Diabetic Eye Exam     1. Type 2 diabetes mellitus with hyperglycemia, unspecified whether long term insulin use (H)   2. Bilateral nonexudative age-related macular degeneration, unspecified stage   3. Dry eyes, bilateral   4. Nuclear sclerotic cataract of both eyes   5. Presbyopia      Patient states vision is a little more blurry each eye at near over the last couple years. \"I need readers for small print. I use +1.75.\" Denies glare or halos. Patient notes some dryness each eye. No eye drops used.      Chief Complaint(s) and History of Present Illness(es)       Diabetic Eye Exam              Associated symptoms: blurred vision, itching (some) and tearing.  Negative for flashes and floaters    Diabetes Type: Type 2, on insulin and taking oral medications    Duration: years    Treatments tried: glasses    Pain scale: 0/10    Comments: 1. Type 2 diabetes mellitus with hyperglycemia, unspecified whether long term insulin use (H)   2. Bilateral nonexudative age-related macular degeneration, unspecified stage   3. Dry eyes, bilateral   4. Nuclear sclerotic cataract of both eyes   5. Presbyopia      Patient states vision is a little more blurry each eye at near over the last couple years. \"I need readers for small print. I use +1.75.\" Denies glare or halos. Patient notes some dryness each eye. No eye drops used.               Comments    DM2  Last BS: 200-210 (currently fasting); BS is normally high  Lab Results       Component                Value               Date                       A1C                      14.1                01/24/2024                 A1C                      10.5                02/15/2022                 A1C                      12.7                11/30/2020                 A1C                      13.4                02/04/2020                 A1C                      11.0                09/17/2018                 A1C  "                     11.2                11/14/2017                 A1C                      10.2                01/31/2017              VEENA Nelson 2:46 PM 04/01/2024

## 2024-04-04 ENCOUNTER — OFFICE VISIT (OUTPATIENT)
Dept: CARDIOLOGY | Facility: CLINIC | Age: 54
End: 2024-04-04
Attending: NURSE PRACTITIONER
Payer: COMMERCIAL

## 2024-04-04 ENCOUNTER — OFFICE VISIT (OUTPATIENT)
Dept: FAMILY MEDICINE | Facility: CLINIC | Age: 54
End: 2024-04-04
Payer: COMMERCIAL

## 2024-04-04 ENCOUNTER — PRE VISIT (OUTPATIENT)
Dept: CARDIOLOGY | Facility: CLINIC | Age: 54
End: 2024-04-04
Payer: COMMERCIAL

## 2024-04-04 VITALS
SYSTOLIC BLOOD PRESSURE: 136 MMHG | OXYGEN SATURATION: 99 % | DIASTOLIC BLOOD PRESSURE: 76 MMHG | WEIGHT: 151.8 LBS | HEART RATE: 80 BPM | BODY MASS INDEX: 21.18 KG/M2

## 2024-04-04 VITALS
BODY MASS INDEX: 20.83 KG/M2 | SYSTOLIC BLOOD PRESSURE: 136 MMHG | HEART RATE: 74 BPM | DIASTOLIC BLOOD PRESSURE: 76 MMHG | OXYGEN SATURATION: 98 % | WEIGHT: 149.3 LBS

## 2024-04-04 DIAGNOSIS — I34.0 MYXOMATOUS MITRAL VALVE REGURGITATION: ICD-10-CM

## 2024-04-04 DIAGNOSIS — E11.65 UNCONTROLLED TYPE 2 DIABETES MELLITUS WITH HYPERGLYCEMIA (H): Primary | ICD-10-CM

## 2024-04-04 DIAGNOSIS — I10 PRIMARY HYPERTENSION: Primary | ICD-10-CM

## 2024-04-04 DIAGNOSIS — E11.9 TYPE 2 DIABETES MELLITUS WITHOUT COMPLICATION, UNSPECIFIED WHETHER LONG TERM INSULIN USE (H): ICD-10-CM

## 2024-04-04 DIAGNOSIS — I34.0 SEVERE MITRAL REGURGITATION: ICD-10-CM

## 2024-04-04 DIAGNOSIS — M54.50 LOW BACK PAIN, UNSPECIFIED BACK PAIN LATERALITY, UNSPECIFIED CHRONICITY, UNSPECIFIED WHETHER SCIATICA PRESENT: ICD-10-CM

## 2024-04-04 PROCEDURE — 99213 OFFICE O/P EST LOW 20 MIN: CPT | Performed by: INTERNAL MEDICINE

## 2024-04-04 PROCEDURE — G2211 COMPLEX E/M VISIT ADD ON: HCPCS | Performed by: INTERNAL MEDICINE

## 2024-04-04 PROCEDURE — 99213 OFFICE O/P EST LOW 20 MIN: CPT | Performed by: FAMILY MEDICINE

## 2024-04-04 PROCEDURE — 99205 OFFICE O/P NEW HI 60 MIN: CPT | Performed by: INTERNAL MEDICINE

## 2024-04-04 RX ORDER — LISINOPRIL 20 MG/1
20 TABLET ORAL DAILY
Qty: 90 TABLET | Refills: 3 | Status: SHIPPED | OUTPATIENT
Start: 2024-04-04

## 2024-04-04 ASSESSMENT — PAIN SCALES - GENERAL: PAINLEVEL: NO PAIN (0)

## 2024-04-04 NOTE — PROGRESS NOTES
Assessment & Plan     Uncontrolled type 2 diabetes mellitus with hyperglycemia (H)  See HPI    Low back pain, unspecified back pain laterality, unspecified chronicity, unspecified whether sciatica present  Form done, per pt still doing PT      28 minutes spent by me on the date of the encounter doing chart review, history and exam, documentation and further activities per the note          No follow-ups on file.    Allyson Menjivar is a 54 year old, presenting for the following health issues:  Forms (Discuss FMLA form restriction.) and RECHECK (Medication review.)      4/4/2024     6:08 PM   Additional Questions   Roomed by KTR   Accompanied by FMLA     History of Present Illness       Reason for visit:  FMLA Form    He eats 2-3 servings of fruits and vegetables daily.He consumes 0 sweetened beverage(s) daily.He exercises with enough effort to increase his heart rate 9 or less minutes per day.  He exercises with enough effort to increase his heart rate 3 or less days per week.   He is taking medications regularly.   1-FMLA form needs to be redone he states; it is work workability form. Done in visit, one page, sent to scan, see that, he can work now, full time w/ overtime, although he has weight restrictions  2-DM: still hi. Not set up to see MTM or Endo, discussed need for these, for now, go up from 20-25 unit(s)/day insulin, MTM contacted.  No acute c/o  Past Medical History:   Diagnosis Date    Hyperlipidemia LDL goal < 100     T2DM (type 2 diabetes mellitus) (H)      Past Surgical History:   Procedure Laterality Date    COLONOSCOPY N/A 12/9/2022    Procedure: COLONOSCOPY;  Surgeon: Amisha Bailey MD;  Location: [facility] OR    NO HISTORY OF SURGERY       Current Outpatient Medications   Medication Sig Dispense Refill    blood glucose (FREESTYLE LITE) test strip USE TO TEST BLOOD SUGAR THREE TIMES A DAY OR AS DIRECTED 300 strip 3    blood glucose monitoring (FREESTYLE) lancets USE TO TEST BLOOD SUGAR THREE TIMES  A DAY OR AS DIRECTED 300 each 1    blood glucose monitoring (NO BRAND SPECIFIED) meter device kit Use to test blood sugar 2-3 times daily or as directed. 1 kit 0    chlorhexidine (PERIDEX) 0.12 % solution Swish and spit 15 mLs in mouth 2 times daily 473 mL 0    Insulin Glargine-yfgn (SEMGLEE, YFGN,) 100 UNIT/ML SOPN Inject 20 Units Subcutaneous daily      insulin pen needle (31G X 5 MM) 31G X 5 MM miscellaneous Use 1 pen needles daily or as directed. 100 each 3    insulin pen needle (BD MO U/F) 32G X 4 MM miscellaneous USE WITH INSULIN PEN ONCE DAILY AS DIRECTED 100 each 0    insulin pen needle 31G X 8 MM Use daily to inject Lantus. Please make ANNUAL exam with PRIMARY provider for continued refills. 30 each 5    lisinopril (ZESTRIL) 10 MG tablet Take 1 tablet (10 mg) by mouth daily 90 tablet 3    lisinopril (ZESTRIL) 20 MG tablet Take 1 tablet (20 mg) by mouth daily 90 tablet 3    metFORMIN (GLUCOPHAGE XR) 500 MG 24 hr tablet TAKE FOUR TABLETS BY MOUTH ONCE DAILY WITH DINNER. 360 tablet 3    Multiple Vitamin (MULTIVITAMIN ADULT PO)       order for DME Equipment being ordered: Blood pressure cuff and monitor 1 Device 0    polyethylene glycol (GOLYTELY) 236 g suspension The night before the exam at 6 pm drink an 8-ounce glass every 15 minutes until the jug is half empty. If you arrive before 11 AM: Drink the other half of the Golytely jug at 11 PM night before procedure. If you arrive after 11 AM: Drink the other half of the Golytely jug at 6 AM day of procedure. For additional instructions refer to your colonoscopy prep instructions. 4000 mL 0    Semaglutide (RYBELSUS) 3 MG tablet Take 3 mg by mouth daily 90 tablet 3    sildenafil (VIAGRA) 100 MG tablet TAKE ONE-HALF TO ONE TABLET BY MOUTH ONCE DAILY AS NEEDED 20 tablet 11    simvastatin (ZOCOR) 20 MG tablet Take 1 tablet (20 mg) by mouth At Bedtime 90 tablet 3    tadalafil (ADCIRCA/CIALIS) 20 MG tablet Take 1 tablet (20 mg) by mouth every 24 hours Prn 13 tablet  11    tiZANidine (ZANAFLEX) 2 MG tablet Take 1 tablet (2 mg) by mouth 2 times daily as needed for muscle spasms 20 tablet 0    bisacodyl (DULCOLAX) 5 MG EC tablet Take 2 tablets at 3 pm the day before your procedure. If your procedure is before 11 am, take 2 additional tablets at 11 pm. If your procedure is after 11 am, take 2 additional tablets at 6 am. For additional instructions refer to your colonoscopy prep instructions. (Patient not taking: Reported on 2024) 4 tablet 0    Blood Pressure Monitoring (BLOOD PRESSURE KIT) KIT Take blood pressure daily and keep list for MD visits (Patient not taking: Reported on 2024) 1 kit 0     No current facility-administered medications for this visit.     No Known Allergies  Family History   Problem Relation Age of Onset    Diabetes Mother     Alcohol/Drug Brother     Cancer No family hx of     Melanoma No family hx of     Skin Cancer No family hx of      Social History     Socioeconomic History    Marital status:      Spouse name: Not on file    Number of children: Not on file    Years of education: Not on file    Highest education level: Not on file   Occupational History    Not on file   Tobacco Use    Smoking status: Former     Packs/day: 0.50     Years: 17.00     Additional pack years: 0.00     Total pack years: 8.50     Types: Cigarettes     Quit date: 2008     Years since quittin.2    Smokeless tobacco: Never   Substance and Sexual Activity    Alcohol use: No    Drug use: No    Sexual activity: Yes     Partners: Female   Other Topics Concern    Parent/sibling w/ CABG, MI or angioplasty before 65F 55M? Not Asked   Social History Narrative    Works in [employer] at [employer]. Came to [facility] from [place] in .     Social Determinants of Health     Financial Resource Strain: Low Risk  (2024)    Financial Resource Strain     Within the past 12 months, have you or your family members you live with been unable to get utilities (heat,  electricity) when it was really needed?: No   Food Insecurity: Low Risk  (1/24/2024)    Food Insecurity     Within the past 12 months, did you worry that your food would run out before you got money to buy more?: No     Within the past 12 months, did the food you bought just not last and you didn t have money to get more?: No   Transportation Needs: Low Risk  (1/24/2024)    Transportation Needs     Within the past 12 months, has lack of transportation kept you from medical appointments, getting your medicines, non-medical meetings or appointments, work, or from getting things that you need?: No   Physical Activity: Not on file   Stress: Not on file   Social Connections: Not on file   Interpersonal Safety: Low Risk  (1/24/2024)    Interpersonal Safety     Do you feel physically and emotionally safe where you currently live?: Yes     Within the past 12 months, have you been hit, slapped, kicked or otherwise physically hurt by someone?: No     Within the past 12 months, have you been humiliated or emotionally abused in other ways by your partner or ex-partner?: No   Housing Stability: Low Risk  (1/24/2024)    Housing Stability     Do you have housing? : Yes     Are you worried about losing your housing?: No             Objective    /76 (BP Location: Right arm, Patient Position: Sitting, Cuff Size: Adult Regular)   Pulse 80   Wt 68.9 kg (151 lb 12.8 oz)   SpO2 99%   BMI 21.18 kg/m    Body mass index is 21.18 kg/m .  Physical Exam   GENERAL: alert and no distress  MS: no gross musculoskeletal defects noted, no edema            Signed Electronically by: Antelmo Carrera MD
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
## 2024-04-04 NOTE — NURSING NOTE
Chief Complaint   Patient presents with    New Patient     Dr. Dhillon: Patient is referred by Antelmo Carrera MD for cardiac evaluation related to history of abnormal echocardiogram.       Vitals were taken, medications reviewed.    Amanda Hernandes, EMT   12:43 PM

## 2024-04-04 NOTE — PATIENT INSTRUCTIONS
Dr. Dhillon recommends:    Increase Lisinopril to 20 MG once daily.    Endocrine referral placed for assessment and treatment of diabetes.     JANES (Trans Esophageal Echocardiogram) for the near future.    Follow up to be determined after review of JANES. Patient will be called with plan of care.    Thank you for your visit today.  Please call me with any questions or concerns.   Yoandy Brito RN  Cardiology Care Coordinator  [phone]

## 2024-04-05 ENCOUNTER — DOCUMENTATION ONLY (OUTPATIENT)
Dept: FAMILY MEDICINE | Facility: CLINIC | Age: 54
End: 2024-04-05
Payer: COMMERCIAL

## 2024-04-05 NOTE — PROGRESS NOTES
Type of Form Received:     Form Received (Date) 4/4/24   Form Filled out Yes, faxed 4/4 & 4/5 with date   Placed in provider folder No     Filled out in visit

## 2024-04-09 ENCOUNTER — TELEPHONE (OUTPATIENT)
Dept: FAMILY MEDICINE | Facility: CLINIC | Age: 54
End: 2024-04-09
Payer: COMMERCIAL

## 2024-04-09 DIAGNOSIS — N52.8 OTHER MALE ERECTILE DYSFUNCTION: ICD-10-CM

## 2024-04-15 RX ORDER — SILDENAFIL 100 MG/1
TABLET, FILM COATED ORAL
Qty: 20 TABLET | Refills: 11 | Status: SHIPPED | OUTPATIENT
Start: 2024-04-15

## 2024-04-15 NOTE — TELEPHONE ENCOUNTER
[facility] Call Center    Phone Message    May a detailed message be left on voicemail: yes     Reason for Call: Medication Refill Request    Has the patient contacted the pharmacy for the refill? Yes   Name of medication being requested:   Sildenafil Citrate TAKE ONE-HALF TO ONE TABLET BY MOUTH ONCE DAILY AS NEEDED  Provider who prescribed the medication: Antelmo Carrera MD   Pharmacy: [address]   Date medication is needed: when able    Action Taken: Message routed to:  [facility] PCC    Travel Screening: Not Applicable

## 2024-04-26 DIAGNOSIS — E11.9 TYPE 2 DIABETES MELLITUS WITHOUT COMPLICATION, UNSPECIFIED WHETHER LONG TERM INSULIN USE (H): ICD-10-CM

## 2024-04-26 RX ORDER — PEN NEEDLE, DIABETIC 32GX 5/32"
NEEDLE, DISPOSABLE MISCELLANEOUS
Qty: 100 EACH | Refills: 1 | Status: SHIPPED | OUTPATIENT
Start: 2024-04-26

## 2024-05-02 ENCOUNTER — TELEPHONE (OUTPATIENT)
Dept: FAMILY MEDICINE | Facility: CLINIC | Age: 54
End: 2024-05-02
Payer: COMMERCIAL

## 2024-05-02 DIAGNOSIS — E11.65 TYPE 2 DIABETES MELLITUS WITH HYPERGLYCEMIA, WITH LONG-TERM CURRENT USE OF INSULIN (H): ICD-10-CM

## 2024-05-02 DIAGNOSIS — Z79.4 TYPE 2 DIABETES MELLITUS WITH HYPERGLYCEMIA, WITH LONG-TERM CURRENT USE OF INSULIN (H): ICD-10-CM

## 2024-05-02 NOTE — TELEPHONE ENCOUNTER
[facility] Call Center    Phone Message    May a detailed message be left on voicemail: yes     Reason for Call: Medication Refill Request    Has the patient contacted the pharmacy for the refill? Yes     Name of medication being requested: lantus insulin    Provider who prescribed the medication: Debi    Pharmacy:  [facility] - [place], [place] - [address] [place] [address]   Date medication is needed: asap     Action Taken: Message routed to:  [facility] geraldine    Travel Screening: Not Applicable

## 2024-05-03 NOTE — TELEPHONE ENCOUNTER
"Insulin Glargine-yfgn (SEMGLEE, YFGN,) 100 UNIT/ML SOPN   Historical    4/4/2024  [facility]    Antelmo Carrera MD  Family Medicine    Routed because: per pt call request lantus. Insulin Glargine-yfgn (SEMGLEE, YFGN,) 100 UNIT/ML SOPN  on med list as historical. lantus not on med list.  Per call note \" Pharmacy:  [facility] - [place], [place] - [address] [place] [address]   Date medication is needed: asap\"              "

## 2024-05-03 NOTE — TELEPHONE ENCOUNTER
Attempted to contact pt to clarify medication requested. Voicemail left with instructions to return call to clinic.

## 2024-05-03 NOTE — TELEPHONE ENCOUNTER
Pt calling back to speak with Tatum and also pt is wondering if he can just  the medication since he is out of it for 2 days now. Pt will be going to pharmacy today.

## 2024-05-06 RX ORDER — INSULIN GLARGINE-YFGN 100 [IU]/ML
18 INJECTION, SOLUTION SUBCUTANEOUS DAILY
Qty: 15 ML | Refills: 0 | Status: SHIPPED | OUTPATIENT
Start: 2024-05-06 | End: 2024-07-09

## 2024-05-06 NOTE — TELEPHONE ENCOUNTER
Patient is still unable to get his insulin, requesting this be sent over to [facility]charge pharmacy. Patient has been taking insulin once a day 25 something, he is unsure what units. Please review and help patient.    [facility] - [place], [place] - [address] [place] [address]

## 2024-05-28 ENCOUNTER — OFFICE VISIT (OUTPATIENT)
Dept: FAMILY MEDICINE | Facility: CLINIC | Age: 54
End: 2024-05-28
Payer: COMMERCIAL

## 2024-05-28 VITALS
WEIGHT: 148.7 LBS | OXYGEN SATURATION: 97 % | SYSTOLIC BLOOD PRESSURE: 143 MMHG | BODY MASS INDEX: 20.75 KG/M2 | DIASTOLIC BLOOD PRESSURE: 81 MMHG | HEART RATE: 91 BPM

## 2024-05-28 DIAGNOSIS — M54.50 LOW BACK PAIN, UNSPECIFIED BACK PAIN LATERALITY, UNSPECIFIED CHRONICITY, UNSPECIFIED WHETHER SCIATICA PRESENT: ICD-10-CM

## 2024-05-28 DIAGNOSIS — N52.9 ERECTILE DYSFUNCTION, UNSPECIFIED ERECTILE DYSFUNCTION TYPE: ICD-10-CM

## 2024-05-28 DIAGNOSIS — E11.9 TYPE 2 DIABETES MELLITUS WITHOUT COMPLICATION, UNSPECIFIED WHETHER LONG TERM INSULIN USE (H): Primary | ICD-10-CM

## 2024-05-28 DIAGNOSIS — Z00.00 HEALTH CARE MAINTENANCE: ICD-10-CM

## 2024-05-28 PROCEDURE — 99214 OFFICE O/P EST MOD 30 MIN: CPT | Performed by: FAMILY MEDICINE

## 2024-05-28 PROCEDURE — G2211 COMPLEX E/M VISIT ADD ON: HCPCS | Performed by: FAMILY MEDICINE

## 2024-05-28 RX ORDER — VARDENAFIL HYDROCHLORIDE 20 MG/1
TABLET ORAL
Qty: 10 TABLET | Refills: 1 | Status: SHIPPED | OUTPATIENT
Start: 2024-05-28

## 2024-05-28 NOTE — PROGRESS NOTES
Assessment & Plan     Type 2 diabetes mellitus without complication, unspecified whether long term insulin use (H)  I've reached out to MT to help make appt. He knows to keep Endo visti. For now I ask him to go up from 25 unit(s) a day to 30 unit(s) a day current insulin, cont other meds  - TSH with free T4 reflex; Future  - Hemoglobin A1c; Future    Health care maintenance  Due  - PSA, screen; Future    Erectile dysfunction, unspecified erectile dysfunction type  No help Viagra or Cialis. Will try Levitra, I suggest if not helping see Uro, control DM.  - Adult Urology  Referral; Future  - Testosterone total; Future    Low back pain, unspecified back pain laterality, unspecified chronicity, unspecified whether sciatica present  Better per pt, he asks for full duty work note, done      33 minutes spent by me on the date of the encounter doing chart review, history and exam, documentation and further activities per the note  The longitudinal plan of care for the diagnosis(es)/condition(s) as documented were addressed during this visit. Due to the added complexity in care, I will continue to support Otto in the subsequent management and with ongoing continuity of care.  No follow-ups on file.    Allyson Menjivar is a 54 year old, presenting for the following health issues:  Follow Up      5/28/2024     9:51 AM   Additional Questions   Roomed by MR     History of Present Illness       Reason for visit:  General    He eats 2-3 servings of fruits and vegetables daily.He consumes 2 sweetened beverage(s) daily.He exercises with enough effort to increase his heart rate 30 to 60 minutes per day.  He exercises with enough effort to increase his heart rate 7 days per week.   He is taking medications regularly.   Here for a few things  One-DM; endo visit set but pushed back till later in year, discussed we'd like their input. DM needs better mgmt, now am sugar about 200, discussed should be closer to 100.  Tolerates meds, takes, I ask him to go up from 25 unit(s) a day insulin to 30 unit(s) a day now. He did not see MTM, we had discussed, he agrees to go to expedite med changes, Otto states he knows our MTM provider and will call himself, I also am messaging MTM to help set up appt.  Heart murmur: he'd like to do JANES was told needs dental extractions first he's working on that  Due for baseline PSA  DM: due for A1c, tsh  Work: he feels can go back to work now w/o restriction (we rvwd April workability form), doing better, he reuqests note to that effect, given to him today; last FMLA form done Feb 2024 rvwd too.        Objective  BP (!) 143/81 (BP Location: Right arm, Patient Position: Sitting, Cuff Size: Adult Regular)   Pulse 91   Wt 67.4 kg (148 lb 11.2 oz)   SpO2 97%   BMI 20.75 kg/m      BP (!) 146/72 (BP Location: Right arm, Patient Position: Sitting, Cuff Size: Adult Regular)   Pulse 91   Wt 67.4 kg (148 lb 11.2 oz)   SpO2 97%   BMI 20.75 kg/m    Body mass index is 20.75 kg/m .  Physical Exam   GENERAL: alert and no distress  MS: no gross musculoskeletal defects noted, no edema            Signed Electronically by: Antemlo Carrera MD

## 2024-05-28 NOTE — LETTER
Otto Hughes  [address] [address]  : 1970        May 28, 2024          To whom it may concern:    Otto is my patient. As of today, he may work full time and full duty.    Antelmo Carrera MD

## 2024-05-30 ENCOUNTER — TELEPHONE (OUTPATIENT)
Dept: FAMILY MEDICINE | Facility: CLINIC | Age: 54
End: 2024-05-30
Payer: COMMERCIAL

## 2024-05-30 NOTE — TELEPHONE ENCOUNTER
Unable to contact pt for the patient to call back and schedule the following:    Appointment type: urology referral  Provider: per PCP  Return date: any  Specialty phone number: [phone]

## 2024-05-31 ENCOUNTER — TELEPHONE (OUTPATIENT)
Dept: FAMILY MEDICINE | Facility: CLINIC | Age: 54
End: 2024-05-31
Payer: COMMERCIAL

## 2024-05-31 NOTE — TELEPHONE ENCOUNTER
Unable to contact pt for the patient to call back and schedule the following:    Appointment type: urology referral  Provider: per Dr. Carrera  Return date: any  Specialty phone number: [phone]

## 2024-06-03 ENCOUNTER — TELEPHONE (OUTPATIENT)
Dept: UROLOGY | Facility: CLINIC | Age: 54
End: 2024-06-03
Payer: COMMERCIAL

## 2024-06-03 NOTE — TELEPHONE ENCOUNTER
Patient Contacted for the patient to call back and schedule the following:    Unable to LVM    Appointment type: New urology   Provider: Dr. Mays or Isaac Disla   Return date: Next available   Specialty phone number: [phone]  Additional appointment(s) needed: N/A  Additonal Notes: Schedule per referral // staff message

## 2024-06-05 ENCOUNTER — TELEPHONE (OUTPATIENT)
Dept: PHARMACY | Facility: CLINIC | Age: 54
End: 2024-06-05
Payer: COMMERCIAL

## 2024-06-05 NOTE — TELEPHONE ENCOUNTER
Pt's son answered and I advised was calling to schedule a new doctors appointment, and he stated will relay the message to the patient. Pt will call to schedule.    No

## 2024-06-10 ENCOUNTER — TELEPHONE (OUTPATIENT)
Dept: FAMILY MEDICINE | Facility: CLINIC | Age: 54
End: 2024-06-10
Payer: COMMERCIAL

## 2024-06-18 ENCOUNTER — TELEPHONE (OUTPATIENT)
Dept: FAMILY MEDICINE | Facility: CLINIC | Age: 54
End: 2024-06-18
Payer: COMMERCIAL

## 2024-06-18 NOTE — TELEPHONE ENCOUNTER
[facility] Call Center    Phone Message    May a detailed message be left on voicemail: yes     Reason for Call: Medication Question or concern regarding medication   Prescription Clarification  Name of Medication: vardenafil (LEVITRA) 20 MG tablet [[id number]]   Prescribing Provider: Antelmo Carrera MD    Pharmacy: [facility] - [place], [place] - [address] [place] [address]   What on the order needs clarification? Patient has questions in regards to medication Rx'd and would like a return call.       Action Taken: Other: pcc    Travel Screening: Not Applicable     Date of Service:

## 2024-06-18 NOTE — TELEPHONE ENCOUNTER
Pt reports that vardenafil is not helpful, wants to know the next step. According to OV note on 5/28/24, he was referred to see urology if this med is not helpful. Pt will call urology for scheduling.
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
## 2024-07-02 DIAGNOSIS — E11.65 TYPE 2 DIABETES MELLITUS WITH HYPERGLYCEMIA, WITH LONG-TERM CURRENT USE OF INSULIN (H): ICD-10-CM

## 2024-07-02 DIAGNOSIS — R52 WHOLE BODY PAIN: ICD-10-CM

## 2024-07-02 DIAGNOSIS — Z79.4 TYPE 2 DIABETES MELLITUS WITH HYPERGLYCEMIA, WITH LONG-TERM CURRENT USE OF INSULIN (H): ICD-10-CM

## 2024-07-08 NOTE — TELEPHONE ENCOUNTER
[facility] Call Center    Phone Message    May a detailed message be left on voicemail: yes     Reason for Call: Other: Per pt is concern that he wont get any medication filled since he will be running out of the medication sometime this week. Per pt would like to ask if the medication if going to be filled until he gets seen on 08/01/24? Please call pt back to confirm. Thank you!     Action Taken: Message routed to:  [facility]    Travel Screening: Not Applicable     Date of Service:

## 2024-07-09 RX ORDER — TIZANIDINE 2 MG/1
2 TABLET ORAL 2 TIMES DAILY PRN
Qty: 20 TABLET | Refills: 0 | Status: SHIPPED | OUTPATIENT
Start: 2024-07-09 | End: 2024-09-04

## 2024-07-09 RX ORDER — INSULIN GLARGINE-YFGN 100 [IU]/ML
18 INJECTION, SOLUTION SUBCUTANEOUS DAILY
Qty: 15 ML | Refills: 0 | Status: SHIPPED | OUTPATIENT
Start: 2024-07-09 | End: 2024-09-06

## 2024-08-13 ENCOUNTER — LAB (OUTPATIENT)
Dept: LAB | Facility: CLINIC | Age: 54
End: 2024-08-13
Payer: COMMERCIAL

## 2024-08-13 ENCOUNTER — ANCILLARY PROCEDURE (OUTPATIENT)
Dept: GENERAL RADIOLOGY | Facility: CLINIC | Age: 54
End: 2024-08-13
Attending: FAMILY MEDICINE
Payer: COMMERCIAL

## 2024-08-13 ENCOUNTER — TELEPHONE (OUTPATIENT)
Dept: ORTHOPEDICS | Facility: CLINIC | Age: 54
End: 2024-08-13

## 2024-08-13 ENCOUNTER — HOSPITAL ENCOUNTER (OUTPATIENT)
Facility: CLINIC | Age: 54
End: 2024-08-13
Attending: ORTHOPAEDIC SURGERY | Admitting: ORTHOPAEDIC SURGERY
Payer: COMMERCIAL

## 2024-08-13 ENCOUNTER — OFFICE VISIT (OUTPATIENT)
Dept: ORTHOPEDICS | Facility: CLINIC | Age: 54
End: 2024-08-13
Payer: COMMERCIAL

## 2024-08-13 ENCOUNTER — OFFICE VISIT (OUTPATIENT)
Dept: FAMILY MEDICINE | Facility: CLINIC | Age: 54
End: 2024-08-13
Payer: COMMERCIAL

## 2024-08-13 VITALS
HEART RATE: 102 BPM | SYSTOLIC BLOOD PRESSURE: 148 MMHG | WEIGHT: 147.7 LBS | OXYGEN SATURATION: 95 % | DIASTOLIC BLOOD PRESSURE: 81 MMHG | BODY MASS INDEX: 20.68 KG/M2 | HEIGHT: 71 IN | RESPIRATION RATE: 16 BRPM

## 2024-08-13 DIAGNOSIS — E11.65 TYPE 2 DIABETES MELLITUS WITH HYPERGLYCEMIA, WITH LONG-TERM CURRENT USE OF INSULIN (H): ICD-10-CM

## 2024-08-13 DIAGNOSIS — Z79.4 TYPE 2 DIABETES MELLITUS WITH HYPERGLYCEMIA, WITH LONG-TERM CURRENT USE OF INSULIN (H): ICD-10-CM

## 2024-08-13 DIAGNOSIS — M79.662 PAIN IN LEFT LOWER LEG: Primary | ICD-10-CM

## 2024-08-13 DIAGNOSIS — N52.9 ERECTILE DYSFUNCTION, UNSPECIFIED ERECTILE DYSFUNCTION TYPE: ICD-10-CM

## 2024-08-13 DIAGNOSIS — Z01.818 PREOP GENERAL PHYSICAL EXAM: ICD-10-CM

## 2024-08-13 DIAGNOSIS — M25.572 PAIN IN JOINT, ANKLE AND FOOT, LEFT: Primary | ICD-10-CM

## 2024-08-13 DIAGNOSIS — M79.662 PAIN IN LEFT SHIN: ICD-10-CM

## 2024-08-13 DIAGNOSIS — Z00.00 HEALTH CARE MAINTENANCE: Primary | ICD-10-CM

## 2024-08-13 DIAGNOSIS — M79.662 PAIN IN LEFT LOWER LEG: ICD-10-CM

## 2024-08-13 DIAGNOSIS — Z00.00 HEALTH CARE MAINTENANCE: ICD-10-CM

## 2024-08-13 DIAGNOSIS — E11.9 TYPE 2 DIABETES MELLITUS WITHOUT COMPLICATION, UNSPECIFIED WHETHER LONG TERM INSULIN USE (H): ICD-10-CM

## 2024-08-13 DIAGNOSIS — S89.92XA LEFT LEG INJURY, INITIAL ENCOUNTER: Primary | ICD-10-CM

## 2024-08-13 DIAGNOSIS — R93.1 ECHOCARDIOGRAM ABNORMAL: ICD-10-CM

## 2024-08-13 LAB
ANION GAP SERPL CALCULATED.3IONS-SCNC: 10 MMOL/L (ref 7–15)
ATRIAL RATE - MUSE: 93 BPM
BASOPHILS # BLD AUTO: 0 10E3/UL (ref 0–0.2)
BASOPHILS NFR BLD AUTO: 1 %
BUN SERPL-MCNC: 17 MG/DL (ref 6–20)
CALCIUM SERPL-MCNC: 9.8 MG/DL (ref 8.8–10.4)
CHLORIDE SERPL-SCNC: 105 MMOL/L (ref 98–107)
CREAT SERPL-MCNC: 0.78 MG/DL (ref 0.67–1.17)
DIASTOLIC BLOOD PRESSURE - MUSE: NORMAL MMHG
EGFRCR SERPLBLD CKD-EPI 2021: >90 ML/MIN/1.73M2
EOSINOPHIL # BLD AUTO: 0.1 10E3/UL (ref 0–0.7)
EOSINOPHIL NFR BLD AUTO: 1 %
ERYTHROCYTE [DISTWIDTH] IN BLOOD BY AUTOMATED COUNT: 12.6 % (ref 10–15)
GLUCOSE SERPL-MCNC: 269 MG/DL (ref 70–99)
HBA1C MFR BLD: 11.3 %
HCO3 SERPL-SCNC: 25 MMOL/L (ref 22–29)
HCT VFR BLD AUTO: 47.2 % (ref 40–53)
HGB BLD-MCNC: 16.1 G/DL (ref 13.3–17.7)
IMM GRANULOCYTES # BLD: 0 10E3/UL
IMM GRANULOCYTES NFR BLD: 0 %
INTERPRETATION ECG - MUSE: NORMAL
LYMPHOCYTES # BLD AUTO: 2 10E3/UL (ref 0.8–5.3)
LYMPHOCYTES NFR BLD AUTO: 25 %
MCH RBC QN AUTO: 29.9 PG (ref 26.5–33)
MCHC RBC AUTO-ENTMCNC: 34.1 G/DL (ref 31.5–36.5)
MCV RBC AUTO: 88 FL (ref 78–100)
MONOCYTES # BLD AUTO: 0.9 10E3/UL (ref 0–1.3)
MONOCYTES NFR BLD AUTO: 11 %
NEUTROPHILS # BLD AUTO: 4.9 10E3/UL (ref 1.6–8.3)
NEUTROPHILS NFR BLD AUTO: 62 %
NRBC # BLD AUTO: 0 10E3/UL
NRBC BLD AUTO-RTO: 0 /100
P AXIS - MUSE: 62 DEGREES
PLATELET # BLD AUTO: 250 10E3/UL (ref 150–450)
POTASSIUM SERPL-SCNC: 4.1 MMOL/L (ref 3.4–5.3)
PR INTERVAL - MUSE: 138 MS
PSA SERPL DL<=0.01 NG/ML-MCNC: 0.69 NG/ML (ref 0–3.5)
QRS DURATION - MUSE: 90 MS
QT - MUSE: 350 MS
QTC - MUSE: 435 MS
R AXIS - MUSE: 40 DEGREES
RBC # BLD AUTO: 5.39 10E6/UL (ref 4.4–5.9)
SODIUM SERPL-SCNC: 140 MMOL/L (ref 135–145)
SYSTOLIC BLOOD PRESSURE - MUSE: NORMAL MMHG
T AXIS - MUSE: 84 DEGREES
TSH SERPL DL<=0.005 MIU/L-ACNC: 1.89 UIU/ML (ref 0.3–4.2)
VENTRICULAR RATE- MUSE: 93 BPM
WBC # BLD AUTO: 7.9 10E3/UL (ref 4–11)

## 2024-08-13 PROCEDURE — 99417 PROLNG OP E/M EACH 15 MIN: CPT | Performed by: FAMILY MEDICINE

## 2024-08-13 PROCEDURE — 84443 ASSAY THYROID STIM HORMONE: CPT | Performed by: PATHOLOGY

## 2024-08-13 PROCEDURE — 83036 HEMOGLOBIN GLYCOSYLATED A1C: CPT | Performed by: FAMILY MEDICINE

## 2024-08-13 PROCEDURE — 84403 ASSAY OF TOTAL TESTOSTERONE: CPT | Performed by: FAMILY MEDICINE

## 2024-08-13 PROCEDURE — 99000 SPECIMEN HANDLING OFFICE-LAB: CPT | Performed by: PATHOLOGY

## 2024-08-13 PROCEDURE — 36415 COLL VENOUS BLD VENIPUNCTURE: CPT | Performed by: PATHOLOGY

## 2024-08-13 PROCEDURE — G0103 PSA SCREENING: HCPCS | Performed by: PATHOLOGY

## 2024-08-13 PROCEDURE — 99203 OFFICE O/P NEW LOW 30 MIN: CPT | Mod: 57 | Performed by: FAMILY MEDICINE

## 2024-08-13 PROCEDURE — 99215 OFFICE O/P EST HI 40 MIN: CPT | Performed by: FAMILY MEDICINE

## 2024-08-13 PROCEDURE — 85025 COMPLETE CBC W/AUTO DIFF WBC: CPT | Performed by: PATHOLOGY

## 2024-08-13 PROCEDURE — 93000 ELECTROCARDIOGRAM COMPLETE: CPT | Performed by: FAMILY MEDICINE

## 2024-08-13 PROCEDURE — 80048 BASIC METABOLIC PNL TOTAL CA: CPT | Performed by: PATHOLOGY

## 2024-08-13 PROCEDURE — 73590 X-RAY EXAM OF LOWER LEG: CPT | Mod: LT | Performed by: RADIOLOGY

## 2024-08-13 RX ORDER — CEFAZOLIN SODIUM 2 G/50ML
2 SOLUTION INTRAVENOUS SEE ADMIN INSTRUCTIONS
Status: CANCELLED | OUTPATIENT
Start: 2024-08-14

## 2024-08-13 RX ORDER — CEFAZOLIN SODIUM 2 G/50ML
2 SOLUTION INTRAVENOUS
Status: CANCELLED | OUTPATIENT
Start: 2024-08-14

## 2024-08-13 ASSESSMENT — ENCOUNTER SYMPTOMS: LEG PAIN: 1

## 2024-08-13 NOTE — LETTER
Otto Hughes  [address] [address]  : 1970  MRN:  [id number]      2024          To whom it may concern:    Otto is my patient. For medical reasons he missed work starting , and he will be off work through , returning to work .     Antelmo Carrera MD

## 2024-08-13 NOTE — CONFIDENTIAL NOTE
Message received from PAN. Patient last took Semaglutide Sunday or Monday. Surgery cancelled 8/14. Recommended alternating OTC Tylenol and Ibuprofen to help with pain. Encouraged activity modification, ice and elevation. He verbalized understanding and will wait for call to reschedule surgery date.     Tara Holter, RNCC

## 2024-08-13 NOTE — LETTER
Otto Hughes  [address] [facility] [address]  : 1970  MRN:  [id number]      2024          I am the primary doctor for Mr. Hughes. He is having surgery tomorrow 2024. He needs to have his wife present before during and after his surgery. Please excuse her from work 2024.    Antelmo Carrera MD
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
## 2024-08-13 NOTE — PROGRESS NOTES
Assessment & Plan     Health care maintenance  Due  - PSA, screen; Future    Type 2 diabetes mellitus with hyperglycemia, with long-term current use of insulin (H)  Due  - Hemoglobin A1c; Future  - TSH with free T4 reflex; Future  - Basic metabolic panel  (Ca, Cl, CO2, Creat, Gluc, K, Na, BUN); Future    Erectile dysfunction, unspecified erectile dysfunction type  Due  - Testosterone total; Future  - TSH with free T4 reflex; Future  - CBC with platelets and differential; Future    Pain in left shin  See their notes   - Orthopedic  Referral; Future    Echocardiogram abnormal  No change from EKG done earlier this year; he is not having cardiac symptoms (he does know to do JANES when can, discussed again today, but needs this foreign body removed)  - EKG 12-lead complete w/read - Clinics    Preop general physical exam  Done  The longitudinal plan of care for the diagnosis(es)/condition(s) as documented were addressed during this visit. Due to the added complexity in care, I will continue to support Otto in the subsequent management and with ongoing continuity of care.  67 minutes spent by me on the date of the encounter doing chart review, history and exam, documentation and further activities per the note; not includihng EKG read time    Time spent iniital eval/treat, then arranging Sports Med visit, then discussing with them before and after they saw him, then seeing him again for preop portion      Antelmo Carrera MD      No follow-ups on file.    Subjective   Otto is a 54 year old, presenting for the following health issues:  Leg Pain (Left leg pain, mowing lawn and rock hit shin, 10/10 pain)      8/13/2024    10:04 AM   Additional Questions   Roomed by jacquie     Leg Pain    History of Present Illness       Reason for visit:  Left leg pain    He eats 4 or more servings of fruits and vegetables daily.He consumes 1 sweetened beverage(s) daily.He exercises with enough effort to increase his heart rate  60 or more minutes per day.  He exercises with enough effort to increase his heart rate 7 days per week.   He is taking medications regularly.   Today Otto comes in saying three days ago, mowing the lawn, his L shin suddenly hurt when struck by a rock his mower threw. DTAP utd.  Since, pain worse, swollen there too.  He is not able to bear weight without limping due to the pain    Also due for DM labs/ED labs as discussed at last visit    No cardiac symptoms on ROS; see cardiology note from earlier this year, due for JANES, has not set up, I remind him to do, he agrees to set up soon    Today I examine him, note the L prox shin is swollen and tender (no red warm draninage), one cm healed scab linear wound, and due to severity of pain get him into Sports Med who finds the foreign body; likely he was not hit by a stone, rather, by a metal wire which is now stuck in his lateral calf.     Our visit converted to a preop after he left, saw Sports Med and got scheduled for surgery tmrw at [facility] to remove his foreign body    He is on insulin 30 units once a day, in the evening; today, he will just use 20 units in anticipation of NPO tomorrow. Likewise, he takes 4 metformin tabs once a day, in the evening; he will only take 2 metformin tab today. He can take other meds as is, for am meds reviewed just to use early with sip of water.    Work letter given till end of week, I told him surgeon might adjust that (he walks all day at work), plus a letter for his wife to miss work tmrw to be with him for surgery    Low risk surgery    Past Medical History:   Diagnosis Date    Hyperlipidemia LDL goal < 100     T2DM (type 2 diabetes mellitus) (H)      Past Surgical History:   Procedure Laterality Date    COLONOSCOPY N/A 12/9/2022    Procedure: COLONOSCOPY;  Surgeon: Amisha Bailey MD;  Location: [facility] OR    NO HISTORY OF SURGERY       Current Outpatient Medications   Medication Sig Dispense Refill    blood glucose (FREESTYLE LITE) test  strip USE TO TEST BLOOD SUGAR THREE TIMES A DAY OR AS DIRECTED 300 strip 3    blood glucose monitoring (FREESTYLE) lancets USE TO TEST BLOOD SUGAR THREE TIMES A DAY OR AS DIRECTED 300 each 1    blood glucose monitoring (NO BRAND SPECIFIED) meter device kit Use to test blood sugar 2-3 times daily or as directed. 1 kit 0    chlorhexidine (PERIDEX) 0.12 % solution Swish and spit 15 mLs in mouth 2 times daily 473 mL 0    diclofenac (VOLTAREN) 1 % topical gel       Insulin Glargine-yfgn (SEMGLEE, YFGN,) 100 UNIT/ML SOPN Inject 18 Units subcutaneously daily Please keep the appointment on 8/1/24 for further refills. 15 mL 0    insulin pen needle (31G X 5 MM) 31G X 5 MM miscellaneous Use 1 pen needles daily or as directed. 100 each 3    insulin pen needle (BD MO U/F) 32G X 4 MM miscellaneous USE WITH INSULIN PEN ONCE DAILY AS DIRECTED 100 each 1    insulin pen needle 31G X 8 MM Use daily to inject Lantus. Please make ANNUAL exam with PRIMARY provider for continued refills. 30 each 5    lisinopril (ZESTRIL) 10 MG tablet Take 1 tablet (10 mg) by mouth daily 90 tablet 3    lisinopril (ZESTRIL) 20 MG tablet Take 1 tablet (20 mg) by mouth daily 90 tablet 3    metFORMIN (GLUCOPHAGE XR) 500 MG 24 hr tablet TAKE FOUR TABLETS BY MOUTH ONCE DAILY WITH DINNER. 360 tablet 3    Multiple Vitamin (MULTIVITAMIN ADULT PO)       Semaglutide (RYBELSUS) 3 MG tablet Take 3 mg by mouth daily 90 tablet 3    sildenafil (VIAGRA) 100 MG tablet TAKE ONE-HALF TO ONE TABLET BY MOUTH ONCE DAILY AS NEEDED 20 tablet 11    simvastatin (ZOCOR) 20 MG tablet Take 1 tablet (20 mg) by mouth At Bedtime 90 tablet 3    tiZANidine (ZANAFLEX) 2 MG tablet TAKE ONE TABLET BY MOUTH TWICE A DAY AS NEEDED FOR MUSCLE SPASMS 20 tablet 0    vardenafil (LEVITRA) 20 MG tablet Take 1/2 to 1 full tab one half hour before needed 10 tablet 1    bisacodyl (DULCOLAX) 5 MG EC tablet Take 2 tablets at 3 pm the day before your procedure. If your procedure is before 11 am, take 2  additional tablets at 11 pm. If your procedure is after 11 am, take 2 additional tablets at 6 am. For additional instructions refer to your colonoscopy prep instructions. (Patient not taking: Reported on 2024) 4 tablet 0    Blood Pressure Monitoring (BLOOD PRESSURE KIT) KIT Take blood pressure daily and keep list for MD visits (Patient not taking: Reported on 2024) 1 kit 0    order for DME Equipment being ordered: Blood pressure cuff and monitor (Patient not taking: Reported on 2024) 1 Device 0    polyethylene glycol (GOLYTELY) 236 g suspension The night before the exam at 6 pm drink an 8-ounce glass every 15 minutes until the jug is half empty. If you arrive before 11 AM: Drink the other half of the Golytely jug at 11 PM night before procedure. If you arrive after 11 AM: Drink the other half of the Golytely jug at 6 AM day of procedure. For additional instructions refer to your colonoscopy prep instructions. 4000 mL 0    tadalafil (ADCIRCA/CIALIS) 20 MG tablet Take 1 tablet (20 mg) by mouth every 24 hours Prn (Patient not taking: Reported on 2024) 13 tablet 11     No current facility-administered medications for this visit.     No Known Allergies  Family History   Problem Relation Age of Onset    Diabetes Mother     Alcohol/Drug Brother     Cancer No family hx of     Melanoma No family hx of     Skin Cancer No family hx of      Social History     Socioeconomic History    Marital status:      Spouse name: Not on file    Number of children: Not on file    Years of education: Not on file    Highest education level: Not on file   Occupational History    Not on file   Tobacco Use    Smoking status: Former     Current packs/day: 0.00     Average packs/day: 0.5 packs/day for 17.0 years (8.5 ttl pk-yrs)     Types: Cigarettes     Start date: 1991     Quit date: 2008     Years since quittin.6    Smokeless tobacco: Never   Vaping Use    Vaping status: Never Used   Substance and Sexual  "Activity    Alcohol use: No    Drug use: No    Sexual activity: Yes     Partners: Female   Other Topics Concern    Parent/sibling w/ CABG, MI or angioplasty before 65F 55M? Not Asked   Social History Narrative    Works in [employer] at [employer] [facility] [employer]. Came to [facility] from [place] in 1990.     Social Determinants of Health     Financial Resource Strain: Low Risk  (1/24/2024)    Financial Resource Strain     Within the past 12 months, have you or your family members you live with been unable to get utilities (heat, electricity) when it was really needed?: No   Food Insecurity: Low Risk  (1/24/2024)    Food Insecurity     Within the past 12 months, did you worry that your food would run out before you got money to buy more?: No     Within the past 12 months, did the food you bought just not last and you didn t have money to get more?: No   Transportation Needs: Low Risk  (1/24/2024)    Transportation Needs     Within the past 12 months, has lack of transportation kept you from medical appointments, getting your medicines, non-medical meetings or appointments, work, or from getting things that you need?: No   Physical Activity: Not on file   Stress: Not on file   Social Connections: Not on file   Interpersonal Safety: Low Risk  (8/13/2024)    Interpersonal Safety     Do you feel physically and emotionally safe where you currently live?: Yes     Within the past 12 months, have you been hit, slapped, kicked or otherwise physically hurt by someone?: No     Within the past 12 months, have you been humiliated or emotionally abused in other ways by your partner or ex-partner?: No   Housing Stability: Low Risk  (1/24/2024)    Housing Stability     Do you have housing? : Yes     Are you worried about losing your housing?: No         Objective    BP (!) 144/80 (BP Location: Left arm, Patient Position: Sitting, Cuff Size: Adult Regular)   Pulse 101   Resp 16   Ht 1.791 m (5' 10.5\")   Wt 67 kg (147 lb 11.2 oz)   SpO2 95%   BMI " 20.89 kg/m    Body mass index is 20.89 kg/m .  Physical Exam   GENERAL: alert and no distress  EYES: Eyes grossly normal to inspection, PERRL and conjunctivae and sclerae normal  HENT: ear canals and TM's normal, nose and mouth without ulcers or lesions  NECK: no adenopathy, no asymmetry, masses, or scars  RESP: lungs clear to auscultation - no rales, rhonchi or wheezes  CV: regular rate and rhythm, normal S1 S2, no S3 or S4, chronic 3/6 systolic murmur, click or rub, no peripheral edema  ABDOMEN: soft, nontender, no hepatosplenomegaly, no masses and bowel sounds normal  MS: no gross musculoskeletal defects noted, no edema, except L shin, proximal/anterior, one cm linear scab overlying three inches of swollen tissue, no red or warm, but it is tender  SKIN: no suspicious lesions or rashes  NEURO: Normal strength and tone, mentation intact and speech normal  PSYCH: mentation appears normal, affect normal/bright            Signed Electronically by: Antelmo Carrera MD
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
## 2024-08-15 ENCOUNTER — TELEPHONE (OUTPATIENT)
Dept: INTERNAL MEDICINE | Facility: CLINIC | Age: 54
End: 2024-08-15
Payer: COMMERCIAL

## 2024-08-15 DIAGNOSIS — Z79.4 TYPE 2 DIABETES MELLITUS WITH HYPERGLYCEMIA, WITH LONG-TERM CURRENT USE OF INSULIN (H): Primary | ICD-10-CM

## 2024-08-15 DIAGNOSIS — E11.65 TYPE 2 DIABETES MELLITUS WITH HYPERGLYCEMIA, WITH LONG-TERM CURRENT USE OF INSULIN (H): Primary | ICD-10-CM

## 2024-08-15 LAB — TESTOST SERPL-MCNC: 294 NG/DL (ref 240–950)

## 2024-08-19 ENCOUNTER — TELEPHONE (OUTPATIENT)
Dept: ORTHOPEDICS | Facility: CLINIC | Age: 54
End: 2024-08-19
Payer: COMMERCIAL

## 2024-08-19 ENCOUNTER — TELEPHONE (OUTPATIENT)
Dept: INTERNAL MEDICINE | Facility: CLINIC | Age: 54
End: 2024-08-19
Payer: COMMERCIAL
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
## 2024-08-19 NOTE — TELEPHONE ENCOUNTER
Pt has an upcoming fabrice with Dr. Carrera on 8/22/24 and MTM appt on 9/10/24. I called the pt, but pt's son answered and he will remind the pt regarding these appts.

## 2024-08-19 NOTE — TELEPHONE ENCOUNTER
Other: Pt is calling because he wants to discuss his upcoming surgery.  He's taking lots of tylenol and Ibprofin and it's not helping.  Please call to triage.        Could we send this information to you in [employer] or would you prefer to receive a phone call?:   Patient would prefer a phone call   Okay to leave a detailed message?: Yes at Cell number on file:    Telephone Information:   Mobile [phone]     Please call the cell first and then home if he doesn't answer.     Thanks

## 2024-08-19 NOTE — CONFIDENTIAL NOTE
Returned call to patient. He denies any redness, warmth or drainage. No recent fevers/chills. He is taking Tylenol 1000 mg every 6-8 hours and Ibuprofen once a day. He reports the pain medications will help for 2-3 hours and then his pain will return. He purchased lidocaine patches which provided little relief. I informed him I would discuss with Dr. Pedroza but most pain medication we would prescribe he will not be able to work or drive. Patient states he wants to be able to work and drive. 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 We discussed potential work restrictions which he declined as his employer will not allow him to work overtime if he has restrictions. He understands that being on his feet that much is most likely worsening his pain and he should focus on rest, ice, elevation, and use OTC medications until surgery. He requested to callback tomorrow to discuss further as he has the day off and Dr. Pedroza will be in clinic.     Tara Holter, RNCC

## 2024-08-19 NOTE — TELEPHONE ENCOUNTER
Phoned patient to schedule surgery with Dr. Pedroza. Patient was not available, will try again later.    Patient is tentatively scheduled for surgery on 8/28/24.    Jackie  Perioperative   [phone]

## 2024-08-20 NOTE — TELEPHONE ENCOUNTER
Patient is scheduled for surgery with Dr. Pedroza    Spoke with: patient    Date of Surgery: 8/28/24    Location: [place]    Post op: 9/9/24, 10/8/24    Pre op with Provider complete    H&P: 8/13/24 Dr. Carrera    Additional imaging/appointments: N/A    Surgery packet: received     Additional comments: N/A        Jackie Jung CMA on 8/20/2024 at 12:51 PM

## 2024-08-21 DIAGNOSIS — E11.65 TYPE 2 DIABETES MELLITUS WITH HYPERGLYCEMIA, WITH LONG-TERM CURRENT USE OF INSULIN (H): ICD-10-CM

## 2024-08-21 DIAGNOSIS — Z79.4 TYPE 2 DIABETES MELLITUS WITH HYPERGLYCEMIA, WITH LONG-TERM CURRENT USE OF INSULIN (H): ICD-10-CM

## 2024-08-22 ENCOUNTER — TELEPHONE (OUTPATIENT)
Dept: CARDIOLOGY | Facility: CLINIC | Age: 54
End: 2024-08-22

## 2024-08-22 ENCOUNTER — OFFICE VISIT (OUTPATIENT)
Dept: FAMILY MEDICINE | Facility: CLINIC | Age: 54
End: 2024-08-22
Payer: COMMERCIAL

## 2024-08-22 VITALS
OXYGEN SATURATION: 99 % | BODY MASS INDEX: 21.26 KG/M2 | WEIGHT: 150.3 LBS | DIASTOLIC BLOOD PRESSURE: 80 MMHG | HEART RATE: 90 BPM | SYSTOLIC BLOOD PRESSURE: 146 MMHG

## 2024-08-22 DIAGNOSIS — M79.662 PAIN IN LEFT SHIN: ICD-10-CM

## 2024-08-22 DIAGNOSIS — R93.1 ECHOCARDIOGRAM ABNORMAL: ICD-10-CM

## 2024-08-22 DIAGNOSIS — I34.0 MYXOMATOUS MITRAL VALVE REGURGITATION: ICD-10-CM

## 2024-08-22 DIAGNOSIS — N52.9 ERECTILE DYSFUNCTION, UNSPECIFIED ERECTILE DYSFUNCTION TYPE: Primary | ICD-10-CM

## 2024-08-22 PROCEDURE — G2211 COMPLEX E/M VISIT ADD ON: HCPCS | Performed by: FAMILY MEDICINE

## 2024-08-22 PROCEDURE — 99215 OFFICE O/P EST HI 40 MIN: CPT | Performed by: FAMILY MEDICINE

## 2024-08-22 NOTE — LETTER
Otto Hughes  [address] [facility] [address]  : 1970  MRN:  [id number]      2024          Otto is my patient; he has surgery scheduled . He will need that day off work, and can return to work on  in my best estimation. Possibly his surgeon will have new or different advice after the surgery is performed.    Antelmo Carrera MD

## 2024-08-22 NOTE — TELEPHONE ENCOUNTER
8/22 Left Voicemail (1st Attempt) for the patient to call back and schedule the following:    Appointment type: Transesophageal Echocardiogram   Provider: Alejo  Return date: next available  Specialty phone number: [phone] opt 1  Additional appointment(s) needed: n/a  Additonal Notes: n/a
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
## 2024-08-22 NOTE — PROGRESS NOTES
Pre-Op Teaching was done in person at the clinic.    Teaching Flowsheet   Relevant Diagnosis: Left leg foreign body removal  Teaching Topic: Pre-Operative Teaching     Person(s) involved in teaching:   Patient     Motivation Level:  Asks Questions: Yes  Eager to Learn: Yes  Cooperative: Yes  Receptive (willing/able to accept information): Yes  Any cultural factors/Congregation beliefs that may influence understanding or compliance? No     Patient demonstrates understanding of the following:  Reason for the appointment, diagnosis and treatment plan: Yes  Knowledge of proper use of medications and conditions for which they are ordered (with special attention to potential side effects or drug interactions): Yes  Which situations necessitate calling provider and whom to contact: Yes- discussed the stoplight tool to help assist with this.      Teaching Concerns Addressed:      Proper use of surgical scrub explain and provided to patient.    Nutritional needs and diet plan: Yes  Pain management techniques: Yes  Wound Care: Yes  How and/when to access community resources: Yes     Instructional Materials Used/Given: surgical packet and surgical soap  received in clinic.       - Important contact info/ phone numbers  - Map/ location of surgery  - Showering instructions  - Stop light tool    Additionally the following was discussed with patient:  - Patient informed responsible adult is required to drive him home and stay with him for 24 hours after surgery.        -Next step: Perioperative  to contact patient and schedule surgery/post ops., Patient will schedule pre-op H&P with PCP 8/13.     Time spent with patient: 15 minutes.     Tara Holter, RNCC

## 2024-08-22 NOTE — LETTER
Otto Hughes  [address] [facility] [address]  : 1970  MRN:  [id number]      2024          Otto is my patient. He has surgery . His wife will need time off to care for him, please allow her to miss that day and also the next two days, Thursday and Friday.    Antelmo Carrera MD

## 2024-08-22 NOTE — CONFIDENTIAL NOTE
Call placed to patient. Discussed post op recovery/restrictions. He will request FMLA forms from his employer and send them to our office for Dr. Pedroza to complete. He is using OTC Tylenol, Ibuprofen and lidocaine patches to control his pain. He does not want work restrictions as they will not allow him to work overtime. Confirmed hold of Semaglutide for 7 days prior to surgery. Pre op completed 8/13 with PCP and patient was advised on his other medications. He verbalized understanding of all discussed and will call with further questions.     Tara Holter, RNCC

## 2024-08-26 RX ORDER — METFORMIN HCL 500 MG
TABLET, EXTENDED RELEASE 24 HR ORAL
Qty: 360 TABLET | Refills: 3 | Status: SHIPPED | OUTPATIENT
Start: 2024-08-26

## 2024-08-27 ENCOUNTER — TELEPHONE (OUTPATIENT)
Dept: CARDIOLOGY | Facility: CLINIC | Age: 54
End: 2024-08-27
Payer: COMMERCIAL

## 2024-08-27 ENCOUNTER — ANESTHESIA EVENT (OUTPATIENT)
Dept: SURGERY | Facility: CLINIC | Age: 54
End: 2024-08-27
Payer: COMMERCIAL

## 2024-08-27 NOTE — TELEPHONE ENCOUNTER
8/27 Left Voicemail (2nd Attempt) for the patient to call back and schedule the following:    Appointment type: Transesophageal Echocardiogram   Provider: Alejo  Return date: Next Available  Specialty phone number: [phone] opt 1  Additional appointment(s) needed: N/A  Additonal Notes: N/A

## 2024-08-28 ENCOUNTER — ANESTHESIA (OUTPATIENT)
Dept: SURGERY | Facility: CLINIC | Age: 54
End: 2024-08-28
Payer: COMMERCIAL

## 2024-08-28 ENCOUNTER — HOSPITAL ENCOUNTER (OUTPATIENT)
Facility: CLINIC | Age: 54
Discharge: HOME | End: 2024-08-28
Attending: ORTHOPAEDIC SURGERY | Admitting: ORTHOPAEDIC SURGERY
Payer: COMMERCIAL

## 2024-08-28 VITALS
DIASTOLIC BLOOD PRESSURE: 84 MMHG | SYSTOLIC BLOOD PRESSURE: 130 MMHG | RESPIRATION RATE: 18 BRPM | HEIGHT: 70 IN | BODY MASS INDEX: 20.99 KG/M2 | OXYGEN SATURATION: 98 % | HEART RATE: 87 BPM | WEIGHT: 146.61 LBS | TEMPERATURE: 97.5 F
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
## 2024-08-28 DIAGNOSIS — R52 WHOLE BODY PAIN: ICD-10-CM

## 2024-08-28 DIAGNOSIS — Z87.821 H/O RETAINED FOREIGN BODY FULLY REMOVED: ICD-10-CM

## 2024-08-28 DIAGNOSIS — M25.572 PAIN IN JOINT, ANKLE AND FOOT, LEFT: Primary | ICD-10-CM

## 2024-08-28 LAB
GLUCOSE BLDC GLUCOMTR-MCNC: 195 MG/DL (ref 70–99)
GLUCOSE BLDC GLUCOMTR-MCNC: 294 MG/DL (ref 70–99)

## 2024-08-28 PROCEDURE — 250N000011 HC RX IP 250 OP 636: Performed by: NURSE ANESTHETIST, CERTIFIED REGISTERED

## 2024-08-28 PROCEDURE — 370N000017 HC ANESTHESIA TECHNICAL FEE, PER MIN: Performed by: ORTHOPAEDIC SURGERY

## 2024-08-28 PROCEDURE — 710N000012 HC RECOVERY PHASE 2, PER MINUTE: Performed by: ORTHOPAEDIC SURGERY

## 2024-08-28 PROCEDURE — 250N000011 HC RX IP 250 OP 636: Performed by: ORTHOPAEDIC SURGERY

## 2024-08-28 PROCEDURE — 258N000003 HC RX IP 258 OP 636: Performed by: ANESTHESIOLOGY

## 2024-08-28 PROCEDURE — 360N000075 HC SURGERY LEVEL 2, PER MIN: Performed by: ORTHOPAEDIC SURGERY

## 2024-08-28 PROCEDURE — 272N000001 HC OR GENERAL SUPPLY STERILE: Performed by: ORTHOPAEDIC SURGERY

## 2024-08-28 PROCEDURE — 11042 DBRDMT SUBQ TIS 1ST 20SQCM/<: CPT | Performed by: ANESTHESIOLOGY

## 2024-08-28 PROCEDURE — 82962 GLUCOSE BLOOD TEST: CPT

## 2024-08-28 PROCEDURE — 11042 DBRDMT SUBQ TIS 1ST 20SQCM/<: CPT | Performed by: NURSE ANESTHETIST, CERTIFIED REGISTERED

## 2024-08-28 PROCEDURE — 258N000003 HC RX IP 258 OP 636: Performed by: NURSE ANESTHETIST, CERTIFIED REGISTERED

## 2024-08-28 PROCEDURE — 999N000141 HC STATISTIC PRE-PROCEDURE NURSING ASSESSMENT: Performed by: ORTHOPAEDIC SURGERY

## 2024-08-28 PROCEDURE — 250N000012 HC RX MED GY IP 250 OP 636 PS 637: Performed by: ANESTHESIOLOGY

## 2024-08-28 PROCEDURE — 250N000009 HC RX 250: Performed by: NURSE ANESTHETIST, CERTIFIED REGISTERED

## 2024-08-28 PROCEDURE — 250N000013 HC RX MED GY IP 250 OP 250 PS 637: Performed by: PHYSICIAN ASSISTANT

## 2024-08-28 RX ORDER — ONDANSETRON 4 MG/1
4 TABLET, ORALLY DISINTEGRATING ORAL EVERY 30 MIN PRN
Status: DISCONTINUED | OUTPATIENT
Start: 2024-08-28 | End: 2024-08-28 | Stop reason: HOSPADM

## 2024-08-28 RX ORDER — DEXTROSE MONOHYDRATE 25 G/50ML
25-50 INJECTION, SOLUTION INTRAVENOUS
Status: DISCONTINUED | OUTPATIENT
Start: 2024-08-28 | End: 2024-08-28

## 2024-08-28 RX ORDER — NICOTINE POLACRILEX 4 MG
15-30 LOZENGE BUCCAL
Status: DISCONTINUED | OUTPATIENT
Start: 2024-08-28 | End: 2024-08-28

## 2024-08-28 RX ORDER — CEFAZOLIN SODIUM/WATER 2 G/20 ML
2 SYRINGE (ML) INTRAVENOUS
Status: COMPLETED | OUTPATIENT
Start: 2024-08-28 | End: 2024-08-28

## 2024-08-28 RX ORDER — NICOTINE POLACRILEX 4 MG
15-30 LOZENGE BUCCAL
Status: DISCONTINUED | OUTPATIENT
Start: 2024-08-28 | End: 2024-08-28 | Stop reason: HOSPADM

## 2024-08-28 RX ORDER — HYDROMORPHONE HYDROCHLORIDE 1 MG/ML
0.4 INJECTION, SOLUTION INTRAMUSCULAR; INTRAVENOUS; SUBCUTANEOUS EVERY 5 MIN PRN
Status: DISCONTINUED | OUTPATIENT
Start: 2024-08-28 | End: 2024-08-28 | Stop reason: HOSPADM

## 2024-08-28 RX ORDER — BUPIVACAINE HYDROCHLORIDE 2.5 MG/ML
INJECTION, SOLUTION INFILTRATION; PERINEURAL PRN
Status: DISCONTINUED | OUTPATIENT
Start: 2024-08-28 | End: 2024-08-28 | Stop reason: HOSPADM

## 2024-08-28 RX ORDER — DEXAMETHASONE SODIUM PHOSPHATE 4 MG/ML
4 INJECTION, SOLUTION INTRA-ARTICULAR; INTRALESIONAL; INTRAMUSCULAR; INTRAVENOUS; SOFT TISSUE
Status: DISCONTINUED | OUTPATIENT
Start: 2024-08-28 | End: 2024-08-28 | Stop reason: HOSPADM

## 2024-08-28 RX ORDER — HYDROXYZINE HYDROCHLORIDE 25 MG/1
25 TABLET, FILM COATED ORAL 3 TIMES DAILY PRN
Qty: 20 TABLET | Refills: 0 | Status: SHIPPED | OUTPATIENT
Start: 2024-08-28

## 2024-08-28 RX ORDER — SODIUM CHLORIDE, SODIUM LACTATE, POTASSIUM CHLORIDE, CALCIUM CHLORIDE 600; 310; 30; 20 MG/100ML; MG/100ML; MG/100ML; MG/100ML
INJECTION, SOLUTION INTRAVENOUS CONTINUOUS
Status: DISCONTINUED | OUTPATIENT
Start: 2024-08-28 | End: 2024-08-28 | Stop reason: HOSPADM

## 2024-08-28 RX ORDER — FENTANYL CITRATE 50 UG/ML
50 INJECTION, SOLUTION INTRAMUSCULAR; INTRAVENOUS EVERY 5 MIN PRN
Status: DISCONTINUED | OUTPATIENT
Start: 2024-08-28 | End: 2024-08-28 | Stop reason: HOSPADM

## 2024-08-28 RX ORDER — HYDROMORPHONE HYDROCHLORIDE 1 MG/ML
0.2 INJECTION, SOLUTION INTRAMUSCULAR; INTRAVENOUS; SUBCUTANEOUS EVERY 5 MIN PRN
Status: DISCONTINUED | OUTPATIENT
Start: 2024-08-28 | End: 2024-08-28 | Stop reason: HOSPADM

## 2024-08-28 RX ORDER — DEXTROSE MONOHYDRATE 25 G/50ML
25-50 INJECTION, SOLUTION INTRAVENOUS
Status: DISCONTINUED | OUTPATIENT
Start: 2024-08-28 | End: 2024-08-28 | Stop reason: HOSPADM

## 2024-08-28 RX ORDER — DEXAMETHASONE SODIUM PHOSPHATE 4 MG/ML
INJECTION, SOLUTION INTRA-ARTICULAR; INTRALESIONAL; INTRAMUSCULAR; INTRAVENOUS; SOFT TISSUE PRN
Status: DISCONTINUED | OUTPATIENT
Start: 2024-08-28 | End: 2024-08-28

## 2024-08-28 RX ORDER — ONDANSETRON 2 MG/ML
INJECTION INTRAMUSCULAR; INTRAVENOUS PRN
Status: DISCONTINUED | OUTPATIENT
Start: 2024-08-28 | End: 2024-08-28

## 2024-08-28 RX ORDER — AMOXICILLIN 250 MG
1-2 CAPSULE ORAL 2 TIMES DAILY
Qty: 30 TABLET | Refills: 0 | Status: SHIPPED | OUTPATIENT
Start: 2024-08-28 | End: 2024-08-28

## 2024-08-28 RX ORDER — SODIUM CHLORIDE, SODIUM LACTATE, POTASSIUM CHLORIDE, CALCIUM CHLORIDE 600; 310; 30; 20 MG/100ML; MG/100ML; MG/100ML; MG/100ML
INJECTION, SOLUTION INTRAVENOUS CONTINUOUS PRN
Status: DISCONTINUED | OUTPATIENT
Start: 2024-08-28 | End: 2024-08-28

## 2024-08-28 RX ORDER — ONDANSETRON 2 MG/ML
4 INJECTION INTRAMUSCULAR; INTRAVENOUS EVERY 30 MIN PRN
Status: DISCONTINUED | OUTPATIENT
Start: 2024-08-28 | End: 2024-08-28 | Stop reason: HOSPADM

## 2024-08-28 RX ORDER — HYDROCODONE BITARTRATE AND ACETAMINOPHEN 5; 325 MG/1; MG/1
1-2 TABLET ORAL EVERY 4 HOURS PRN
Qty: 10 TABLET | Refills: 0 | Status: SHIPPED | OUTPATIENT
Start: 2024-08-28

## 2024-08-28 RX ORDER — PROPOFOL 10 MG/ML
INJECTION, EMULSION INTRAVENOUS PRN
Status: DISCONTINUED | OUTPATIENT
Start: 2024-08-28 | End: 2024-08-28

## 2024-08-28 RX ORDER — PROPOFOL 10 MG/ML
INJECTION, EMULSION INTRAVENOUS CONTINUOUS PRN
Status: DISCONTINUED | OUTPATIENT
Start: 2024-08-28 | End: 2024-08-28

## 2024-08-28 RX ORDER — HYDROCODONE BITARTRATE AND ACETAMINOPHEN 5; 325 MG/1; MG/1
2 TABLET ORAL
Status: COMPLETED | OUTPATIENT
Start: 2024-08-28 | End: 2024-08-28

## 2024-08-28 RX ORDER — LIDOCAINE HYDROCHLORIDE 20 MG/ML
INJECTION, SOLUTION INFILTRATION; PERINEURAL PRN
Status: DISCONTINUED | OUTPATIENT
Start: 2024-08-28 | End: 2024-08-28

## 2024-08-28 RX ORDER — CEFAZOLIN SODIUM/WATER 2 G/20 ML
2 SYRINGE (ML) INTRAVENOUS SEE ADMIN INSTRUCTIONS
Status: DISCONTINUED | OUTPATIENT
Start: 2024-08-28 | End: 2024-08-28 | Stop reason: HOSPADM

## 2024-08-28 RX ORDER — FENTANYL CITRATE 50 UG/ML
INJECTION, SOLUTION INTRAMUSCULAR; INTRAVENOUS PRN
Status: DISCONTINUED | OUTPATIENT
Start: 2024-08-28 | End: 2024-08-28

## 2024-08-28 RX ORDER — NALOXONE HYDROCHLORIDE 0.4 MG/ML
0.1 INJECTION, SOLUTION INTRAMUSCULAR; INTRAVENOUS; SUBCUTANEOUS
Status: DISCONTINUED | OUTPATIENT
Start: 2024-08-28 | End: 2024-08-28 | Stop reason: HOSPADM

## 2024-08-28 RX ORDER — ONDANSETRON 4 MG/1
4 TABLET, ORALLY DISINTEGRATING ORAL EVERY 8 HOURS PRN
Qty: 4 TABLET | Refills: 0 | Status: SHIPPED | OUTPATIENT
Start: 2024-08-28

## 2024-08-28 RX ORDER — FENTANYL CITRATE 50 UG/ML
25 INJECTION, SOLUTION INTRAMUSCULAR; INTRAVENOUS EVERY 5 MIN PRN
Status: DISCONTINUED | OUTPATIENT
Start: 2024-08-28 | End: 2024-08-28 | Stop reason: HOSPADM

## 2024-08-28 RX ADMIN — SODIUM CHLORIDE, POTASSIUM CHLORIDE, SODIUM LACTATE AND CALCIUM CHLORIDE: 600; 310; 30; 20 INJECTION, SOLUTION INTRAVENOUS at 08:49

## 2024-08-28 RX ADMIN — Medication 2 G: at 08:49

## 2024-08-28 RX ADMIN — SODIUM CHLORIDE, POTASSIUM CHLORIDE, SODIUM LACTATE AND CALCIUM CHLORIDE: 600; 310; 30; 20 INJECTION, SOLUTION INTRAVENOUS at 10:00

## 2024-08-28 RX ADMIN — DEXAMETHASONE SODIUM PHOSPHATE 8 MG: 4 INJECTION, SOLUTION INTRAMUSCULAR; INTRAVENOUS at 08:56

## 2024-08-28 RX ADMIN — PROPOFOL 40 MG: 10 INJECTION, EMULSION INTRAVENOUS at 08:56

## 2024-08-28 RX ADMIN — PROPOFOL 100 MCG/KG/MIN: 10 INJECTION, EMULSION INTRAVENOUS at 08:56

## 2024-08-28 RX ADMIN — SODIUM CHLORIDE 6 UNITS: 9 INJECTION, SOLUTION INTRAVENOUS at 09:01

## 2024-08-28 RX ADMIN — HYDROCODONE BITARTRATE AND ACETAMINOPHEN 2 TABLET: 5; 325 TABLET ORAL at 10:05

## 2024-08-28 RX ADMIN — MIDAZOLAM 2 MG: 1 INJECTION INTRAMUSCULAR; INTRAVENOUS at 08:49

## 2024-08-28 RX ADMIN — LIDOCAINE HYDROCHLORIDE 40 MG: 20 INJECTION, SOLUTION INFILTRATION; PERINEURAL at 08:56

## 2024-08-28 RX ADMIN — FENTANYL CITRATE 50 MCG: 50 INJECTION INTRAMUSCULAR; INTRAVENOUS at 08:56

## 2024-08-28 RX ADMIN — ONDANSETRON 4 MG: 2 INJECTION INTRAMUSCULAR; INTRAVENOUS at 09:07

## 2024-08-28 ASSESSMENT — ACTIVITIES OF DAILY LIVING (ADL)
ADLS_ACUITY_SCORE: 33
ADLS_ACUITY_SCORE: 34
ADLS_ACUITY_SCORE: 34
ADLS_ACUITY_SCORE: 33
ADLS_ACUITY_SCORE: 34

## 2024-08-28 NOTE — ANESTHESIA PREPROCEDURE EVALUATION
Anesthesia Pre-Procedure Evaluation    Patient: Otto Hughes   MRN: [id number] : 1970        Procedure : Procedure(s):  REMOVAL, FOREIGN BODY LOWER LEG          Past Medical History:   Diagnosis Date    Hyperlipidemia LDL goal < 100     T2DM (type 2 diabetes mellitus) (H)       Past Surgical History:   Procedure Laterality Date    COLONOSCOPY N/A 2022    Procedure: COLONOSCOPY;  Surgeon: Amisha Bailey MD;  Location: [facility] OR    NO HISTORY OF SURGERY        No Known Allergies   Social History     Tobacco Use    Smoking status: Former     Current packs/day: 0.00     Average packs/day: 0.5 packs/day for 17.0 years (8.5 ttl pk-yrs)     Types: Cigarettes     Start date: 1991     Quit date: 2008     Years since quittin.6    Smokeless tobacco: Never   Substance Use Topics    Alcohol use: No      Wt Readings from Last 1 Encounters:   24 66.5 kg (146 lb 9.7 oz)        Anesthesia Evaluation   Pt has had prior anesthetic. Type: MAC.        ROS/MED HX  ENT/Pulmonary:       Neurologic:       Cardiovascular:       METS/Exercise Tolerance:     Hematologic:       Musculoskeletal:       GI/Hepatic:       Renal/Genitourinary:       Endo:     (+)  type II DM,                    Psychiatric/Substance Use:       Infectious Disease:       Malignancy:       Other:            Physical Exam    Airway        Mallampati: II   TM distance: > 3 FB   Neck ROM: full   Mouth opening: > 3 cm    Respiratory Devices and Support         Dental       (+) Minor Abnormalities - some fillings, tiny chips      Cardiovascular   cardiovascular exam normal          Pulmonary                   OUTSIDE LABS:  CBC:   Lab Results   Component Value Date    WBC 7.9 2024    WBC 6.3 02/10/2024    HGB 16.1 2024    HGB 16.3 02/10/2024    HCT 47.2 2024    HCT 47.8 02/10/2024     2024     02/10/2024     BMP:   Lab Results   Component Value Date     2024     02/10/2024    POTASSIUM  "4.1 08/13/2024    POTASSIUM 3.6 02/10/2024    CHLORIDE 105 08/13/2024    CHLORIDE 100 02/10/2024    CO2 25 08/13/2024    CO2 26 02/10/2024    BUN 17.0 08/13/2024    BUN 10.1 02/10/2024    CR 0.78 08/13/2024    CR 0.61 (L) 02/10/2024     (H) 08/28/2024     (H) 08/13/2024     COAGS: No results found for: \"PTT\", \"INR\", \"FIBR\"  POC:   Lab Results   Component Value Date     (H) 03/02/2010     HEPATIC:   Lab Results   Component Value Date    ALBUMIN 4.3 02/10/2024    PROTTOTAL 7.6 02/10/2024    ALT 10 02/10/2024    AST 13 02/10/2024    ALKPHOS 81 02/10/2024    BILITOTAL 0.6 02/10/2024     OTHER:   Lab Results   Component Value Date    A1C 11.3 (H) 08/13/2024    DALE 9.8 08/13/2024    TSH 1.89 08/13/2024       Anesthesia Plan    ASA Status:  2    NPO Status:  NPO Appropriate          Maintenance: Balanced.        Consents    Anesthesia Plan(s) and associated risks, benefits, and realistic alternatives discussed. Questions answered and patient/representative(s) expressed understanding.     - Discussed: Risks, Benefits and Alternatives for the PROCEDURE were discussed     - Discussed with:  Patient      - Extended Intubation/Ventilatory Support Discussed: No.      - Patient is DNR/DNI Status: No     Use of blood products discussed: No .     Postoperative Care       PONV prophylaxis: Ondansetron (or other 5HT-3)     Comments:               Renée Garcia MD    I have reviewed the pertinent notes and labs in the chart from the past 30 days and (re)examined the patient.  Any updates or changes from those notes are reflected in this note.              # DMII: A1C = 11.3 % (Ref range: <5.7 %) within past 6 months      "

## 2024-08-28 NOTE — DISCHARGE INSTRUCTIONS
SAME DAY SURGERY DISCHARGE INSTRUCTIONS:    Discharge disposition: Discharge to home     Diet: Advance to a regular diet as tolerated     Activity: Activity as tolerated    Weight bearing status: Weight bearing as tolerated with assistive device as needed    Wound care: Leave initial surgical dressing on x 72 hours followed by application of large Band-Aid. keep incision clean, dry and intact until follow-up appointment. Adjust Ace wrap as needed.      Follow-up: Please follow-up in 2 weeks with Dr. Pedroza's office for suture removal, if appropriate.      For 24 hours after surgery:  1. Get plenty of rest. A responsible adult must stay with you for at least 24 hours after you leave the hospital.  2. Do not drive or use heavy equipment. If you have weakness or tingling, don't drive or use heavy equipment until this feeling goes away.  3. Do not drink alcohol.  4. Avoid strenuous or risky activities. Ask for help when climbing stairs.  5. You may feel lightheaded. If so, sit for a few minutes before standing. Have someone help you get up.   6. If you have nausea (feel sick to your stomach), drink only clear liquids such as apple juice, ginger ale, broth, or 7-UP. Rest may also help. Be sure to drink enough fluids. Move to a regular diet as you feel able.   7. You may have a slight fever. Call your doctor if your fever is over 100.4 F (38 C) (taken under the tongue) or lasts longer than 24 hours.   8. You may have a dry mouth, sore throat, muscle aches, or trouble sleeping. These should go away after 24 hours.   9. Do not make important or legal decisions.     Contact your provider immediately at [phone] for any of the following concerns:   1. Signs of infection such as fever (temperature >100.4 F or 38 C), growing tenderness at the surgery site, a large amount of drainage or bleeding, severe pain, foul-smelling drainage, redness, and/or swelling.  2. If it has been over 8-10 hours since surgery, and you still  have not urinated (or passsed water).   3. Headache for over 24 hours.   4. Numbness, tingling, or weakness the day after surgery (if you had spinal anesthesia).     All after business hours concerns can be addressed by the orthopaedic resident on call using the hospital  at [phone], option 4.    If you have an emergent concern, contact the Emergency Department at the [facility] - [phone] - or [place] - [phone] - [facility].

## 2024-08-28 NOTE — ANESTHESIA POSTPROCEDURE EVALUATION
Patient: Otto Hughes    Procedure: Procedure(s):  REMOVAL, FOREIGN BODY LOWER LEG       Anesthesia Type:  MAC    Note:  Disposition: Outpatient   Postop Pain Control: Uneventful            Sign Out: Well controlled pain   PONV: No   Neuro/Psych: Uneventful            Sign Out: Acceptable/Baseline neuro status   Airway/Respiratory: Uneventful            Sign Out: Acceptable/Baseline resp. status   CV/Hemodynamics: Uneventful            Sign Out: Acceptable CV status; No obvious hypovolemia; No obvious fluid overload   Other NRE:    DID A NON-ROUTINE EVENT OCCUR?            Last vitals:  Vitals Value Taken Time   /77 08/28/24 0940   Temp     Pulse     Resp     SpO2 97 % 08/28/24 0944   Vitals shown include unfiled device data.    Electronically Signed By: Renée Garcia MD  August 28, 2024  9:45 AM

## 2024-08-28 NOTE — ANESTHESIA CARE TRANSFER NOTE
Patient: Otto Hughes    Procedure: Procedure(s):  REMOVAL, FOREIGN BODY LOWER LEG       Diagnosis: Pain in joint, ankle and foot, left [M25.572]  Diagnosis Additional Information: No value filed.    Anesthesia Type:   MAC     Note:    Oropharynx: spontaneously breathing  Level of Consciousness: awake      Independent Airway: airway patency satisfactory and stable  Dentition: dentition unchanged  Vital Signs Stable: post-procedure vital signs reviewed and stable  Report to RN Given: handoff report given  Patient transferred to: PACU    Handoff Report: Identifed the Patient, Identified the Reponsible Provider, Reviewed the pertinent medical history, Discussed the surgical course, Reviewed Intra-OP anesthesia mangement and issues during anesthesia, Set expectations for post-procedure period and Allowed opportunity for questions and acknowledgement of understanding      Vitals:  Vitals Value Taken Time   /83 08/28/24 0935   Temp     Pulse     Resp     SpO2 95 % 08/28/24 0937   Vitals shown include unfiled device data.    Electronically Signed By: EDWARD Colon CRNA  August 28, 2024  9:38 AM

## 2024-08-28 NOTE — BRIEF OP NOTE
Orthopaedic Surgery Brief Op-Note       Patient: Otto Hughes  : 1970  Date of Service: 2024 9:17 AM    Pre-operative diagnosis: Left lower leg intramuscular foreign body   Post-operative diagnosis *Same   Procedure: Procedure(s):  REMOVAL, FOREIGN BODY LOWER LEG   Surgeon(s): Surgeons and Role:     * Leno Pedroza MD - Primary     * Jolie Arriaga PA-C - Assisting   Estimated blood loss: 20 mL    Specimens: * No specimens in log *   Complications: none  Condition: stable    Findings: please see dictated operative note    Plan:  WBAT with assistive device as needed  NO PT needed  ADAT  Dressings: change at 72 hours post-op.  No soaking.  Pain control with orals prn  Bowel prophylaxis with senna-docusate  DVT prophylaxis: mechanical only  Follow-up in clinic for post-op appointment and suture removal in 2 weeks.      Disposition: patient may be discharged with  once appropriate discharge criteria have been met    I assisted with positioning, prepping and draping, and closure.    Jolie Arriaga PA-C  2024 9:17 AM  Orthopaedic Surgery    Thank you for allowing me to participate in this patient's care.   [facility]

## 2024-08-28 NOTE — OP NOTE
Date of surgery: 8/28/2024      Preoperative diagnosis: Left lower leg intramuscular foreign body     Postoperative diagnosis: Left lower leg intramuscular foreign body     Procedure: Left lower leg foreign body removal     Surgeons: Leno Pedroza MD     Assistants: Jolie rAriaga PA-C     Complications: None     Drains: None     EBL: Less than 20 cc     Anesthesia: IV sedation     Indications: Please refer to clinic note for further details     Procedure note: On 8/28/2024 patient was taken to surgery.  Preoperative antibiotics were administered to the patient prior to arrival to the OR     After successful induction of IV sedation he  was placed supine on the table.  The left lower extremity was prepped and draped in sterile fashion.  After exsanguination by gravity, tourniquet cuff was inflated to 300 mmHg on the proximal third of the left thigh.      The pause for the cause was performed according to our institutional policy which confirmed laterality of the procedure.     An incision was made along the anterior compartment of the left lower leg.  The potentials were dissected.  The fracture was seen for prioras well.  With indication of for evaluation.  Somewhat twisted K wire.  The foreign body was extracted in 1 single piece.  No signs of infection or purulent material were visualized during the case.     Tourniquet was deflated.  Satisfactory hemostasis was accomplished.  Wound was closed in layers.  Sterile dressings were applied.  Patient was  transferred in stable condition to PACU.      Plan: Patient will remain weightbearing as tolerated.  He will return to clinic in 2 weeks for suture removal.  At that time he will have no restrictions and he will follow-up on as needed basis.    Patient is not expected to require physical therapy and if that is his desire that will be performed without any restrictions    In the eventuality of him returning to the 6-week appointment no x-rays will be  obtained.    Leno Pedroza MD

## 2024-08-30 ENCOUNTER — TELEPHONE (OUTPATIENT)
Dept: CARDIOLOGY | Facility: CLINIC | Age: 54
End: 2024-08-30
Payer: COMMERCIAL

## 2024-08-30 NOTE — TELEPHONE ENCOUNTER
----- Message from Kev HUERTAS sent at 8/22/2024  5:20 PM CDT -----  Regarding: Transesophageal Echocardiogram  Hello,     This patient was seen in [facility] today, and he has a referral to do a heart test placed in April by Dr Dhlilon;Transesophageal Echocardiogram. I attempted to call the call center line to assist with rescheduling, but they were already closed. Would you be able to assist with rescheduling? I do apologize if this is the incorrect pool.     Thank you!

## 2024-08-30 NOTE — TELEPHONE ENCOUNTER
8/30 talked with pt to schedule JANES, pt would like to schedule some time after he sees his dentist on 9/16, pt would like to discuss with his wife and would like a cb on Monday to schedule NH

## 2024-09-03 ENCOUNTER — TELEPHONE (OUTPATIENT)
Dept: CARDIOLOGY | Facility: CLINIC | Age: 54
End: 2024-09-03
Payer: COMMERCIAL

## 2024-09-03 DIAGNOSIS — Z79.4 TYPE 2 DIABETES MELLITUS WITH HYPERGLYCEMIA, WITH LONG-TERM CURRENT USE OF INSULIN (H): ICD-10-CM

## 2024-09-03 DIAGNOSIS — N52.9 ERECTILE DYSFUNCTION, UNSPECIFIED ERECTILE DYSFUNCTION TYPE: ICD-10-CM

## 2024-09-03 DIAGNOSIS — E11.65 TYPE 2 DIABETES MELLITUS WITH HYPERGLYCEMIA, WITH LONG-TERM CURRENT USE OF INSULIN (H): ICD-10-CM

## 2024-09-03 DIAGNOSIS — E78.5 HYPERLIPIDEMIA, UNSPECIFIED HYPERLIPIDEMIA TYPE: ICD-10-CM

## 2024-09-03 NOTE — TELEPHONE ENCOUNTER
9/3 LVM (3rd Attempt) for the patient to call back and schedule the following:    Appointment type: Transesophageal Echocardiogram  Provider: Alejo  Return date: next available   Specialty phone number: [phone] opt 1  Additional appointment(s) needed: n/a  Additonal Notes: n/a

## 2024-09-03 NOTE — TELEPHONE ENCOUNTER
tiZANidine (ZANAFLEX) 2 MG tablet       Last Written Prescription Date:  7/9/24  Last Fill Quantity: 20,   # refills: 0  Last Office Visit : 8/22/24  Future Office visit:  None    Routing refill request to provider for review/approval because:  Drug not on the [facility], [facility] or [facility] refill protocol or controlled substance    Marlene HUERTAS RN  [facility] Central Nursing/Red Flag Triage & Med Refill Team

## 2024-09-04 RX ORDER — TIZANIDINE 2 MG/1
2 TABLET ORAL 2 TIMES DAILY PRN
Qty: 20 TABLET | Refills: 1 | Status: SHIPPED | OUTPATIENT
Start: 2024-09-04

## 2024-09-05 RX ORDER — SIMVASTATIN 20 MG
20 TABLET ORAL AT BEDTIME
Qty: 90 TABLET | Refills: 1 | Status: SHIPPED | OUTPATIENT
Start: 2024-09-05

## 2024-09-05 NOTE — TELEPHONE ENCOUNTER
LVD: 8/22/2024  [facility] Antelmo Rollins MD  Family Medicine     LDL Cholesterol Calculated   Date Value Ref Range Status   01/24/2024 75 <=100 mg/dL Final   02/04/2020 77 <100 mg/dL Final     Comment:     Desirable:       <100 mg/dl     SIMVASTATIN 20 MG  Refilled per protocol.

## 2024-09-06 ENCOUNTER — TELEPHONE (OUTPATIENT)
Dept: CARDIOLOGY | Facility: CLINIC | Age: 54
End: 2024-09-06
Payer: COMMERCIAL

## 2024-09-06 RX ORDER — INSULIN GLARGINE-YFGN 100 [IU]/ML
INJECTION, SOLUTION SUBCUTANEOUS
Qty: 15 ML | Refills: 3 | Status: SHIPPED | OUTPATIENT
Start: 2024-09-06

## 2024-09-06 RX ORDER — VARDENAFIL HYDROCHLORIDE 20 MG/1
TABLET ORAL
Qty: 10 TABLET | Refills: 1 | OUTPATIENT
Start: 2024-09-06

## 2024-09-06 NOTE — TELEPHONE ENCOUNTER
Medication Requested:   Disp Refills Start End BASIM   Insulin Glargine-yfgn (SEMGLEE YFGN,) 100 UNIT/ML SOPN 15 mL 0 7/9/2024 -- No   Sig - Route: Inject 18 Units subcutaneously daily     ----------------------  Last Office Visit : 8/22/2024  [facility]      Future Office visit:  none  ----------------------      Refill decision: Refill pended and routed to the provider for review/determination due to the following criteria not met:     Medication unable to be refilled by RN due to criteria not met as indicated.                 []Supervision - Pt not seen within past 12 months, no future appointment              []Compliance - lapse in therapy/gap in refills              []Verification - order discrepancy, clarification needed              []Controlled medication              []Medication not on [facility], [facility], [facility] refill protocol   [] Abnormal labs/test:  [] Overdue labs/test:    []Ekly refill given x1, Pt did not follow-up, pended to care team for decision  [] Medication not active on Pt's med list  [] Drug interaction Warning  [] Medication is Reported/Historical              [x]Other: Chart review required    Pass/Fail Protocol Criteria:    Insulin Protocol [id number]/06/2024 02:28 PM   Protocol Details Chart Review Required    Medication is active on med list    Has GFR on file in past 12 months and most recent value is normal    Recent (6 mo) or future (90 days) visit within the authorizing provider's specialty    Medication indicated for associated diagnosis    Patient is 18 years of age or older          Medication Requested:   Disp Refills Start End BASIM   tadalafil (ADCIRCA/CIALIS) 20 MG tablet 13 tablet 11 2/13/2024 -- No   Sig - Route: Take 1 tablet (20 mg) by mouth every 24 hours Prn - Oral     ----------------------  Last Office Visit : 8/22/2024  [facility]      Future Office visit:  none  ----------------------      Refill  decision: Medication refilled per protocol.    Pass/Fail Protocol Criteria:  Erectile Dysfuction Protocol [id number]/06/2024 02:28 PM   Protocol Details Absence of nitrates on medication list    Absence of Alpha Blockers on Med list    Medication is active on med list    Recent (12 mo) or future (90 days) visit within the authorizing provider's specialty    Medicatin indicated for associated diagnosis    Patient is age 18 or older

## 2024-09-06 NOTE — TELEPHONE ENCOUNTER
9/6 Cancelled pt appt due to max attempts reached. Lvm (4th attempt) Sent Letter for pt to schedule transesophageal echo for Dr. Dhillon

## 2024-09-10 ENCOUNTER — OFFICE VISIT (OUTPATIENT)
Dept: ORTHOPEDICS | Facility: CLINIC | Age: 54
End: 2024-09-10
Payer: COMMERCIAL

## 2024-09-10 DIAGNOSIS — Z47.89 ORTHOPEDIC AFTERCARE: Primary | ICD-10-CM

## 2024-09-10 PROCEDURE — 99024 POSTOP FOLLOW-UP VISIT: CPT | Performed by: PHYSICIAN ASSISTANT

## 2024-09-10 NOTE — LETTER
9/10/2024      Otto Hughes  [address] [facility] [address]      Dear Colleague,    Thank you for referring your patient, Otto Hughes, to the [facility] [place]. Please see a copy of my visit note below.    FOOT AND ANKLE SURGERY      DATE OF SERVICE:  9/10/2024       CHIEF COMPLAINT: Status post left lower leg foreign body removal on 8/28    HISTORY OF PRESENT ILLNESS:    Otto Hughes presents to clinic today for evaluation of follow-up regarding the procedure above, about 2 weeks ago.  Patient reports that he is doing well.  He removed his own dressings.  He is interested in going back to work full-time.    Physical Exam:   Left leg: Incision intact.  Wound is well-healed.  Distally, range of motion of his ankle is intact.  DP pulse and sensation are intact.    IMAGING: None    We reviewed the patient's past medical and surgical history, current medications, and drug allergies.     Assessment:  Status post left lower leg foreign body removal     Plan:  Sutures removed.  No restrictions.  Follow-up in 1 month as needed.  Work note provided.  He is comfortable with the plan and has no further questions.    Jared Grimaldo PA-C 11:13 AM     Total of 20 minutes spent on the date of the encounter doing chart review, history and exam, documentation and further activities per the note       Again, thank you for allowing me to participate in the care of your patient.        Sincerely,        Jared Grimaldo PA-C

## 2024-09-10 NOTE — PROGRESS NOTES
FOOT AND ANKLE SURGERY      DATE OF SERVICE:  9/10/2024       CHIEF COMPLAINT: Status post left lower leg foreign body removal on 8/28    HISTORY OF PRESENT ILLNESS:    Otto Hughes presents to clinic today for evaluation of follow-up regarding the procedure above, about 2 weeks ago.  Patient reports that he is doing well.  He removed his own dressings.  He is interested in going back to work full-time.    Physical Exam:   Left leg: Incision intact.  Wound is well-healed.  Distally, range of motion of his ankle is intact.  DP pulse and sensation are intact.    IMAGING: None    We reviewed the patient's past medical and surgical history, current medications, and drug allergies.     Assessment:  Status post left lower leg foreign body removal     Plan:  Sutures removed.  No restrictions.  Follow-up in 1 month as needed.  Work note provided.  He is comfortable with the plan and has no further questions.    Jared Grimaldo PA-C 11:13 AM     Total of 20 minutes spent on the date of the encounter doing chart review, history and exam, documentation and further activities per the note

## 2024-09-12 ENCOUNTER — DOCUMENTATION ONLY (OUTPATIENT)
Dept: ORTHOPEDICS | Facility: CLINIC | Age: 54
End: 2024-09-12
Payer: COMMERCIAL

## 2024-09-12 NOTE — PROGRESS NOTES
Received Completed forms Yes   Faxed Forms [facility] To[facility]  Fax Number: [phone]   Sent to HIM (Date) 9/12/24

## 2024-09-15 NOTE — TELEPHONE ENCOUNTER
MEDICAL RECORDS REQUEST   [facility]  Urology Clinic  [address] [place] [address]  PHONE: [phone]  Fax: [phone]        FUTURE VISIT INFORMATION                                                   JAILENE Driver: 1970 scheduled for future visit at [facility]    APPOINTMENT INFORMATION:  Date: 10/24/2024  Provider:  Yoav Mays MD  Reason for Visit/Diagnosis: ERECTILE DYSFUNCTION    REFERRAL INFORMATION:  Referring provider:  Antelmo Carrera MD in [facility]      RECORDS REQUESTED FOR VISIT                                                     NOTES  STATUS/DETAILS   OFFICE NOTE from referring provider  yes, 2024 -- Antelmo Carrera MD in [facility]     MEDICATION LIST  yes     PRE-VISIT CHECKLIST      Joint diagnostic appointment coordinated correctly          (ensure right order & amount of time) Yes   RECORD COLLECTION COMPLETE Yes

## 2024-09-16 ENCOUNTER — TELEPHONE (OUTPATIENT)
Dept: FAMILY MEDICINE | Facility: CLINIC | Age: 54
End: 2024-09-16
Payer: COMMERCIAL

## 2024-09-16 NOTE — TELEPHONE ENCOUNTER
MTM referral from: [place] clinic visit (referral by provider)    MTM referral outreach attempt #2 on September 16, 2024 at 11:41 AM - no show 9/10      Outcome: Left Message    Use umr fv emp grp for the carrier/Plan on the flowsheet      Snow Durham CMA  MTM

## 2024-09-20 ENCOUNTER — PRE VISIT (OUTPATIENT)
Dept: UROLOGY | Facility: CLINIC | Age: 54
End: 2024-09-20
Payer: COMMERCIAL

## 2024-09-20 NOTE — TELEPHONE ENCOUNTER
Reason for visit: ED    Records/imaging/labs/orders: all records available    Pt called: no need for a call    At Rooming:  Standard rooming      Kyler Cervantes  9/20/2024  9:33 AM

## 2024-10-17 NOTE — TELEPHONE ENCOUNTER
RECORDS RECEIVED FROM: internal/[facility]   DATE RECEIVED: 11/5/24   NOTES (FOR ALL VISITS) STATUS DETAILS   OFFICE NOTES from referring provider Internal 4/4/24 Navi Dhillon MD    OFFICE NOTES from other specialist Internal 5/28/24 Antelmo Carrera MD   8/14/23 Aditya Jett MD  11/21/22 Dariana Scales MD   MEDICATION LIST Internal    IMAGING      CT (HEAD/NECK/CHEST/ABDOMEN) Internal 1/24/24 CT Chest    LABS     DIABETES: HBGA1C, CREATININE, FASTING LIPIDS, MICROALBUMIN URINE, POTASSIUM, TSH, T4    THYROID: TSH, T4, CBC, THYRODLONULIN, TOTAL T3, FREE T4, CALCITONIN, CEA Internal   2/10/24 cbc  8/13/24 hbga1c  8/13/24 tsh w free t4

## 2024-10-24 ENCOUNTER — PRE VISIT (OUTPATIENT)
Dept: UROLOGY | Facility: CLINIC | Age: 54
End: 2024-10-24

## 2024-11-01 ENCOUNTER — TELEPHONE (OUTPATIENT)
Dept: FAMILY MEDICINE | Facility: CLINIC | Age: 54
End: 2024-11-01
Payer: COMMERCIAL

## 2024-11-01 DIAGNOSIS — E11.65 TYPE 2 DIABETES MELLITUS WITH HYPERGLYCEMIA, WITH LONG-TERM CURRENT USE OF INSULIN (H): ICD-10-CM

## 2024-11-01 DIAGNOSIS — Z79.4 TYPE 2 DIABETES MELLITUS WITH HYPERGLYCEMIA, WITH LONG-TERM CURRENT USE OF INSULIN (H): ICD-10-CM

## 2024-11-01 RX ORDER — INSULIN GLARGINE-YFGN 100 [IU]/ML
35 INJECTION, SOLUTION SUBCUTANEOUS DAILY
Qty: 15 ML | Refills: 1 | Status: SHIPPED | OUTPATIENT
Start: 2024-11-01

## 2024-11-01 NOTE — TELEPHONE ENCOUNTER
"Serafin Carrera - Patient is requesting Semglee refills and ran out today. He says his current dose is 35 units daily, however last Rx states 18 units daily. Can you send us an updated order as soon as possible?     I see your last office visit from 8/22/24 states the following: \"No rybelsus till done with surgery, no ibuprofen till done with surgery. Mira before surgery just use 20 unit(s) insulin, otherwise use 35 unit(s) daily. No lisinopril mira before surgery.\"    Thank you,  Nancy Osullivan, PharmD, BCACP  [facility]  [phone]    "

## 2024-11-05 ENCOUNTER — PRE VISIT (OUTPATIENT)
Dept: ENDOCRINOLOGY | Facility: CLINIC | Age: 54
End: 2024-11-05

## 2024-11-12 DIAGNOSIS — Z87.821 H/O RETAINED FOREIGN BODY FULLY REMOVED: ICD-10-CM

## 2024-11-13 RX ORDER — HYDROXYZINE HYDROCHLORIDE 25 MG/1
25 TABLET, FILM COATED ORAL 3 TIMES DAILY PRN
Qty: 20 TABLET | Refills: 0 | OUTPATIENT
Start: 2024-11-13

## 2024-11-13 NOTE — TELEPHONE ENCOUNTER
MEDICAL RECORDS REQUEST   [facility]  Urology Clinic  [address] [place] [address]  PHONE: [phone]  Fax: [phone]                                                    FUTURE VISIT INFORMATION                                                   JAILENE Driver: 1970 scheduled for future visit at [facility]     APPOINTMENT INFORMATION:  Date: 2025  Provider:  Yoav Mays MD  Reason for Visit/Diagnosis: ERECTILE DYSFUNCTION     REFERRAL INFORMATION:  Referring provider:  Antelmo Carrera MD in [facility]        RECORDS REQUESTED FOR VISIT                                                      NOTES   STATUS/DETAILS   OFFICE NOTE from referring provider   yes, 2024 -- Antelmo Carrera MD in [facility]      MEDICATION LIST   yes      PRE-VISIT CHECKLIST        Joint diagnostic appointment coordinated correctly          (ensure right order & amount of time) Yes   RECORD COLLECTION COMPLETE Yes

## 2024-12-03 DIAGNOSIS — E11.9 TYPE 2 DIABETES MELLITUS WITHOUT COMPLICATION, UNSPECIFIED WHETHER LONG TERM INSULIN USE (H): ICD-10-CM

## 2024-12-05 RX ORDER — PEN NEEDLE, DIABETIC 32GX 5/32"
NEEDLE, DISPOSABLE MISCELLANEOUS
Qty: 100 EACH | Refills: 1 | Status: SHIPPED | OUTPATIENT
Start: 2024-12-05

## 2024-12-05 NOTE — TELEPHONE ENCOUNTER
BD Pen Needle Sharmin U/F 32G X 4 MM (insulin pen needle)    Last Clinic Visit: 08/22/2024

## 2024-12-12 DIAGNOSIS — E11.9 TYPE 2 DIABETES MELLITUS WITHOUT COMPLICATION, UNSPECIFIED WHETHER LONG TERM INSULIN USE (H): ICD-10-CM

## 2024-12-15 DIAGNOSIS — Z87.821 H/O RETAINED FOREIGN BODY FULLY REMOVED: ICD-10-CM

## 2024-12-17 RX ORDER — BLOOD-GLUCOSE METER
KIT MISCELLANEOUS
Qty: 300 STRIP | Refills: 3 | Status: SHIPPED | OUTPATIENT
Start: 2024-12-17

## 2024-12-17 NOTE — TELEPHONE ENCOUNTER
LVD:  8/22/2024  [facility] Antelmo Rollins MD     Refilled per protocol.

## 2024-12-19 RX ORDER — HYDROXYZINE HYDROCHLORIDE 25 MG/1
25 TABLET, FILM COATED ORAL 3 TIMES DAILY PRN
Qty: 20 TABLET | Refills: 0 | Status: SHIPPED | OUTPATIENT
Start: 2024-12-19

## 2024-12-19 NOTE — TELEPHONE ENCOUNTER
hydrOXYzine HCl (ATARAX) 25 MG tablet 20 tablet 0 8/28/2024     Last Office Visit : 8/22/24  Future Office visit:  0    Routing refill request to provider for review/approval because:  Medication was previously ordered by ortho-postop   Ortho refused refill  now asking for refill from Dr Carrera

## 2025-01-09 ENCOUNTER — PRE VISIT (OUTPATIENT)
Dept: UROLOGY | Facility: CLINIC | Age: 55
End: 2025-01-09

## 2025-01-11 DIAGNOSIS — Z87.821 H/O RETAINED FOREIGN BODY FULLY REMOVED: ICD-10-CM

## 2025-01-11 DIAGNOSIS — L29.9 ITCHING: Primary | ICD-10-CM

## 2025-01-14 RX ORDER — HYDROXYZINE HYDROCHLORIDE 25 MG/1
25 TABLET, FILM COATED ORAL 3 TIMES DAILY PRN
Qty: 20 TABLET | Refills: 0 | Status: SHIPPED | OUTPATIENT
Start: 2025-01-14

## 2025-01-14 NOTE — TELEPHONE ENCOUNTER
Hydroxyzine HCl 25 mg tablet - 1 tablet PO TID as needed for itching, sleep, and pain     Last Written Prescription Date:  12/19/2024  Last Fill Quantity: 20,   # refills: 0  Last Office Visit : 08/22/2024  Future Office visit:  No future appointments with FP scheduled    Routing refill request to provider for review/approval because:   Medication indicated for associated diagnosis     It appears this medication was discontinued by Dr. Carrera on 12/19/2024

## 2025-02-05 DIAGNOSIS — L29.9 ITCHING: ICD-10-CM

## 2025-02-08 RX ORDER — HYDROXYZINE HYDROCHLORIDE 25 MG/1
25 TABLET, FILM COATED ORAL 3 TIMES DAILY PRN
Qty: 20 TABLET | Refills: 0 | Status: SHIPPED | OUTPATIENT
Start: 2025-02-08

## 2025-02-09 NOTE — TELEPHONE ENCOUNTER
Last Written Prescription:  1/4/25  # 20  ----------------------  Last Visit Date: 8/22/24  Future Visit Date: NONE  ----------------------      [x]  Refill decision: Medication refilled per  Medication Refill in Ambulatory Care  policy.   REPEAT # 20    Request from pharmacy:  Requested Prescriptions   Pending Prescriptions Disp Refills    hydrOXYzine HCl (ATARAX) 25 MG tablet [Pharmacy Med Name: hydrOXYzine HCL 25MG TAB] 20 tablet 0     Sig: TAKE 1 TABLET (25 MG) BY MOUTH 3 TIMES DAILY AS NEEDED FOR ITCHING (SLEEP AND PAIN).       Antihistamines Protocol Passed - 2/8/2025 11:38 PM        Passed - Patient is age 3 or older     Apply age and/or weight-based dosing for peds patients age 3 and older.    Forward request to provider for patients under the age of 3.          Passed - Medication is active on med list        Passed - Recent (12 month) or future (90 days) visit with authorizing provider s specialty     The patient must have completed an in-person or virtual visit within the past 12 months or has a future visit scheduled within the next 90 days with the authorizing provider s specialty.  Urgent care and e-visits do not qualify as an office visit for this protocol.          Passed - Medication indicated for associated diagnosis     The medication is associated with one or more of the following diagnoses:  Allergies  Rhinitis  Upper respiratory tract allergy  Urticaria  Itching  Cystic Fibrosis  Bronchiectasis

## 2025-02-18 DIAGNOSIS — Z79.4 TYPE 2 DIABETES MELLITUS WITH HYPERGLYCEMIA, WITH LONG-TERM CURRENT USE OF INSULIN (H): ICD-10-CM

## 2025-02-18 DIAGNOSIS — E11.65 TYPE 2 DIABETES MELLITUS WITH HYPERGLYCEMIA, WITH LONG-TERM CURRENT USE OF INSULIN (H): ICD-10-CM

## 2025-02-19 RX ORDER — INSULIN GLARGINE-YFGN 100 [IU]/ML
INJECTION, SOLUTION SUBCUTANEOUS
Qty: 15 ML | Refills: 1 | Status: SHIPPED | OUTPATIENT
Start: 2025-02-19

## 2025-02-19 NOTE — TELEPHONE ENCOUNTER
Insulin Protocol Failed  Rerun Protocol (2/19/2025 8:53 AM)    HgbA1C in past 3 or 6 months    If HgbA1C is 8 or greater, it needs to be on file within the past 3 months.  If less than 8, must be on file within the past 6 months.          Recent Labs   Lab Test 08/13/24  1346   A1C 11.3*       Recent (6 mo) or future (90 days) visit within the authorizing provider's specialty    The patient must have completed an in-person or virtual visit within the past 6 months or has a future visit scheduled within the next 90 days with the authorizing provider s specialty.  Urgent care and e-visits do not quality as an office visit for this protocol.    Chart review required    Review Chart.    Do not approve if insulin is used in a pump.  Instead, direct refill request to the patient's endocrinologist.  If the patient doesn't have an endocrinologist, then send the refill to the patient's PCP for review

## 2025-02-19 NOTE — TELEPHONE ENCOUNTER
Last Written Prescription:  Insulin Glargine-yfgn (SEMGLEE, YFGN,) 100 UNIT/ML SOPN 15 mL 1 11/1/2024     ----------------------  Last Visit Date: 8/22/24  Future Visit Date: 6/9/25    [x]  Refill decision: Medication unable to be refilled by RN due to: Medication not included in refill protocol policy     Insulin and insulin pump supplies - refilled per  clinic.  Lida Dillon RN  [facility] Central Nursing/Red Flag Triage & Med Refill Team       Request from pharmacy:  Requested Prescriptions   Pending Prescriptions Disp Refills    SEMGLEE (YFGN) 100 UNIT/ML SOPN [Pharmacy Med Name: SEMGLEE (YFGN) 100UNIT/ML SOPN] 15 mL 1     Sig: INJECT 35 UNITS SUBCUTANEOUSLY DAILY       Insulin Protocol Failed - 2/19/2025  8:50 AM        Failed - HgbA1C in past 3 or 6 months     If HgbA1C is 8 or greater, it needs to be on file within the past 3 months.  If less than 8, must be on file within the past 6 months.     Recent Labs   Lab Test 08/13/24  1346   A1C 11.3*             Failed - Recent (6 mo) or future (90 days) visit within the authorizing provider's specialty     The patient must have completed an in-person or virtual visit within the past 6 months or has a future visit scheduled within the next 90 days with the authorizing provider s specialty.  Urgent care and e-visits do not quality as an office visit for this protocol.          Failed - Chart review required     Review Chart.    Do not approve if insulin is used in a pump.  Instead, direct refill request to the patient's endocrinologist.  If the patient doesn't have an endocrinologist, then send the refill to the patient's PCP for review            Passed - Medication is active on med list and the sig matches. RN to manually verify dose and sig if red X/fail.     If the protocol passes (green check), you do not need to verify med dose and sig.    A prescription matches if they are the same clinical intention.    For Example: once daily and every morning are the  same.    For all fails (red x), verify dose and sig.    If the refill does match what is on file, the RN can still proceed to approve the refill request.     If they do not match, route to the appropriate provider.             Passed - Medication indicated for associated diagnosis     Medication is associated with one or more of the following diagnoses:   - Type 1 diabetes mellitus  - Type 2 diabetes mellitus  - Diabetic nephropathy; Prophylaxis  - Neuropathy due to diabetes mellitus; Prophylaxis  - Retinopathy due to diabetes mellitus; Prophylaxis  - Diabetes mellitus associated with cystic fibrosis  - Disorder of cardiovascular system; Prophylaxis - Type 1 diabetes mellitus   - Disorder of cardiovascular system; Prophylaxis - Type 2 diabetes mellitus            Passed - Patient is 18 years of age or older

## 2025-03-10 DIAGNOSIS — E78.5 HYPERLIPIDEMIA, UNSPECIFIED HYPERLIPIDEMIA TYPE: ICD-10-CM

## 2025-03-10 DIAGNOSIS — L29.9 ITCHING: ICD-10-CM

## 2025-03-11 NOTE — CONFIDENTIAL NOTE
RECORDS RECEIVED FROM: internal   DATE RECEIVED: 11/5/24   NOTES (FOR ALL VISITS) STATUS DETAILS   OFFICE NOTES from referring provider Internal 4/4/24 Navi Dhillon MD    OFFICE NOTES from other specialist Internal 5/28/24 Antelmo Carrera MD   8/14/23 Aditya Jett MD  11/21/22 Dariana Scales MD   MEDICATION LIST Internal     IMAGING        CT (HEAD/NECK/CHEST/ABDOMEN) Internal 1/24/24 CT Chest    LABS       DIABETES: HBGA1C, CREATININE, FASTING LIPIDS, MICROALBUMIN URINE, POTASSIUM, TSH, T4     THYROID: TSH, T4, CBC, THYRODLONULIN, TOTAL T3, FREE T4, CALCITONIN, CEA Internal    Glucose meter- 8.28.24 8/13/24 cbc  8/13/24 hbga1c  8/13/24 tsh w free t4

## 2025-03-12 RX ORDER — HYDROXYZINE HYDROCHLORIDE 25 MG/1
25 TABLET, FILM COATED ORAL 3 TIMES DAILY PRN
Qty: 20 TABLET | Refills: 0 | Status: SHIPPED | OUTPATIENT
Start: 2025-03-12

## 2025-03-12 RX ORDER — SIMVASTATIN 20 MG
20 TABLET ORAL AT BEDTIME
Qty: 90 TABLET | Refills: 0 | Status: SHIPPED | OUTPATIENT
Start: 2025-03-12

## 2025-03-12 NOTE — TELEPHONE ENCOUNTER
Simvastatin 20 mg tablet - 1 tablet PO daily at bedtime        Last Written Prescription Date:  09/05/2024  Last Fill Quantity: 90,   # refills: 1  Last Office Visit : 08/22/2024  Future Office visit:  No future appointment with FP scheduled    Routing refill request to provider for review/approval because:   LDL on file in the past 12 months   01/24/2024: 75    Signing for one refill with instructions to contact FP/PCP for needing lab work done.

## 2025-04-04 DIAGNOSIS — L29.9 ITCHING: ICD-10-CM

## 2025-04-07 RX ORDER — HYDROXYZINE HYDROCHLORIDE 25 MG/1
25 TABLET, FILM COATED ORAL 3 TIMES DAILY PRN
Qty: 20 TABLET | Refills: 0 | Status: SHIPPED | OUTPATIENT
Start: 2025-04-07

## 2025-04-07 NOTE — TELEPHONE ENCOUNTER
Hydroxyzine HCl 25 mg tablet - 1 tablet PO TID PRN for itching, sleep, and pain  Last Clinic Visit: 08/22/2024

## 2025-05-03 DIAGNOSIS — N52.8 OTHER MALE ERECTILE DYSFUNCTION: ICD-10-CM

## 2025-05-05 ENCOUNTER — TELEPHONE (OUTPATIENT)
Dept: FAMILY MEDICINE | Facility: CLINIC | Age: 55
End: 2025-05-05
Payer: COMMERCIAL

## 2025-05-05 RX ORDER — SILDENAFIL 100 MG/1
TABLET, FILM COATED ORAL
Qty: 20 TABLET | Refills: 3 | Status: SHIPPED | OUTPATIENT
Start: 2025-05-05

## 2025-05-05 NOTE — TELEPHONE ENCOUNTER
[facility] Call Center    Phone Message    May a detailed message be left on voicemail: yes     Reason for Call: Medication Question or concern regarding medication   Prescription Clarification  Name of Medication: sildenafil (VIAGRA) 100 MG tablet -  Prescribing Provider:    Pharmacy:   [facility] - [place], [place] - [address] [place] [address]     What on the order needs clarification?     Per pt wants the team to know the brand name Viagra doesn't work but just the sildenafil does. Per pt wants to know if there is any other alternative that might work better then those two? Also per pt pharmacy needs a renal on the RX. Please and thank you.       Action Taken: Message routed to:  [facility] P[facility]    Travel Screening: Not Applicable     Date of Service:                                                                       independent

## 2025-05-05 NOTE — TELEPHONE ENCOUNTER
Last Written Prescription:  sildenafil (VIAGRA) 100 MG tablet 20 tablet 11 4/15/2024 -- No   Sig: TAKE ONE-HALF TO ONE TABLET BY MOUTH ONCE DAILY AS NEEDED     ----------------------  Last Visit Date:     8/22/2024  [facility]      Future Visit Date: 0  ----------------------      Refill decision: Medication refilled per  Medication Refill in Ambulatory Care  policy.          Request from pharmacy:  Requested Prescriptions   Pending Prescriptions Disp Refills    sildenafil (VIAGRA) 100 MG tablet [Pharmacy Med Name: SILDENAFIL CITRATE 100MG TABS] 20 tablet 11     Sig: TAKE ONE-HALF TO ONE TABLETS BY MOUTH ONCE DAILY AS NEEDED       Erectile Dysfuction Protocol Failed - 5/5/2025  3:57 PM        Failed - Medication is active on med list and the sig matches. RN to manually verify dose and sig if red X/fail.     If the protocol passes (green check), you do not need to verify med dose and sig.    A prescription matches if they are the same clinical intention.    For Example: once daily and every morning are the same.    The protocol can not identify upper and lower case letters as matching and will fail.     For Example: Take 1 tablet (50 mg) by mouth daily     TAKE 1 TABLET (50 MG) BY MOUTH DAILY    For all fails (red x), verify dose and sig.    If the refill does match what is on file, the RN can still proceed to approve the refill request.       If they do not match, route to the appropriate provider.             Passed - Absence of nitrates on medication list        Passed - Absence of Alpha Blockers on Med list        Passed - Recent (12 mo) or future (90 days) visit within the authorizing provider's specialty     The patient must have completed an in-person or virtual visit within the past 12 months or has a future visit scheduled within the next 90 days with the authorizing provider s specialty.  Urgent care and e-visits do not qualify as an office visit for this protocol.           Passed - Medication indicated for associated diagnosis     Medication is associated with one or more of the following diagnoses:   Benign prostatic hyperplasia  Erectile dysfunction  Female sexual arousal disorder  Pulmonary hypertension  Raynaud s  Sexual Dysfunction            Passed - Patient is age 18 or older

## 2025-05-06 NOTE — TELEPHONE ENCOUNTER
Left Voicemail (1st Attempt) for the patient to call back and schedule the following:    Appointment type: Return   Provider: PCP or PCC Provider   Return date: Next available   Specialty phone number: [phone]

## 2025-05-08 NOTE — TELEPHONE ENCOUNTER
Left Voicemail (2nd Attempt) for the patient to call back and schedule the following:    Appointment type: Return   Provider: PCP or PCC Provider   Return date: Next available   Specialty phone number: [phone]

## 2025-05-14 DIAGNOSIS — L29.9 ITCHING: ICD-10-CM

## 2025-05-16 NOTE — TELEPHONE ENCOUNTER
Last Written Prescription:  hydrOXYzine HCl (ATARAX) 25 MG tablet  25 mg, 3 TIMES DAILY PRN           Summary: TAKE 1 TABLET (25 MG) BY MOUTH 3 TIMES DAILY AS NEEDED FOR ITCHING (SLEEP AND PAIN)., Disp-20 tablet, R-0, E-Prescribe  Dose, Route, Frequency: 25 mg, Oral, 3 TIMES DAILY PRNStart: 04/07/2025Ordered On: 04/07/2025Pharmacy: [facility] - [place], [place] - [address] Associated: Taking: Long-term: Med Note:                Directions: TAKE 1 TABLET (25 MG) BY MOUTH 3 TIMES DAILY AS NEEDED FOR ITCHING (SLEEP AND PAIN).  Ordering Department: [facility]  Authorized By: Antelmo Carrera MD  Dispense: 20 tablet  Refills: 0 ordered       ----------------------  Last Visit Date: 08/22/2024 Office Visit FP - MOS[facility] Carrera   Future Visit Date: None  ----------------------      Refill decision: Medication refilled per  Medication Refill in Ambulatory Care  policy.   []  If no future appointment scheduled: Route to Clinic Coordinators to contact the pt for appointment.      Refill decision: Medication unable to be refilled by RN due to: Verification - order discrepancy, clarification needed, Sig modification needed and  Medication not active on Pt's med list     Med originally ordered by Ortho/post op, Ortho refused refill and med was d/c'd by Debi 12/19/24. Has since had refills: #20 each on 1/14, 2/8, 3/12, 4/17    Request from pharmacy:  Requested Prescriptions   Pending Prescriptions Disp Refills    hydrOXYzine HCl (ATARAX) 25 MG tablet [Pharmacy Med Name: hydrOXYzine HCL 25MG TAB] 20 tablet 0     Sig: TAKE ONE TABLET BY MOUTH THREE TIMES A DAY AS NEEDED FOR ITCHING (SLEEP AND PAIN).       Antihistamines Protocol Failed - 5/16/2025 11:10 AM        Failed - Medication is active on med list and the sig matches. RN to manually verify dose and sig if red X/fail.     If the protocol passes (green check), you do not need to verify med dose and sig.    A prescription  matches if they are the same clinical intention.    For Example: once daily and every morning are the same.    The protocol can not identify upper and lower case letters as matching and will fail.     For Example: Take 1 tablet (50 mg) by mouth daily     TAKE 1 TABLET (50 MG) BY MOUTH DAILY    For all fails (red x), verify dose and sig.    If the refill does match what is on file, the RN can still proceed to approve the refill request.       If they do not match, route to the appropriate provider.             Passed - Patient is age 3 or older     Apply age and/or weight-based dosing for peds patients age 3 and older.    Forward request to provider for patients under the age of 3.          Passed - Recent (12 month) or future (90 days) visit with authorizing provider s specialty     The patient must have completed an in-person or virtual visit within the past 12 months or has a future visit scheduled within the next 90 days with the authorizing provider s specialty.  Urgent care and e-visits do not qualify as an office visit for this protocol.          Passed - Medication indicated for associated diagnosis     The medication is associated with one or more of the following diagnoses:  Allergies  Rhinitis  Upper respiratory tract allergy  Urticaria  Itching  Cystic Fibrosis  Bronchiectasis           Marlene HUERTAS RN  P [facility]/Red Flag Triage & Med Refill Team

## 2025-05-19 RX ORDER — HYDROXYZINE HYDROCHLORIDE 25 MG/1
TABLET, FILM COATED ORAL
Qty: 20 TABLET | Refills: 0 | Status: SHIPPED | OUTPATIENT
Start: 2025-05-19 | End: 2025-05-21

## 2025-05-20 DIAGNOSIS — L29.9 ITCHING: ICD-10-CM

## 2025-05-20 NOTE — TELEPHONE ENCOUNTER
Bethany Moseley from the pharmacy would like if the team can resend the refill on this medication again. Yesterday the system was down so they did not receive the refill approval for medication hydrOXYzine HCl (ATARAX) 25 MG tablet . Is it possible if Rn Lanre can resend the refill or call it in as a verbal refill? Please and thank you!

## 2025-05-20 NOTE — TELEPHONE ENCOUNTER
Hydroxyzine HCl 25 mg tablet - 1 tablet PO TID PRN for itching, sleep, and pain  Last Clinic Visit: 08/22/2024    Order refilled on 05/19/2025 of 20 tablets with no refills - unsure if refill needed again. Protocol passed but forwarding for review.

## 2025-05-21 RX ORDER — HYDROXYZINE HYDROCHLORIDE 25 MG/1
TABLET, FILM COATED ORAL
Qty: 20 TABLET | Refills: 0 | Status: SHIPPED | OUTPATIENT
Start: 2025-05-21

## 2025-06-09 ENCOUNTER — PRE VISIT (OUTPATIENT)
Dept: ENDOCRINOLOGY | Facility: CLINIC | Age: 55
End: 2025-06-09

## 2025-06-09 DIAGNOSIS — E11.9 TYPE 2 DIABETES MELLITUS WITHOUT COMPLICATION, UNSPECIFIED WHETHER LONG TERM INSULIN USE (H): ICD-10-CM

## 2025-06-09 DIAGNOSIS — E11.65 TYPE 2 DIABETES MELLITUS WITH HYPERGLYCEMIA, WITH LONG-TERM CURRENT USE OF INSULIN (H): ICD-10-CM

## 2025-06-09 DIAGNOSIS — Z79.4 TYPE 2 DIABETES MELLITUS WITH HYPERGLYCEMIA, WITH LONG-TERM CURRENT USE OF INSULIN (H): ICD-10-CM

## 2025-06-09 DIAGNOSIS — E11.9 T2DM (TYPE 2 DIABETES MELLITUS) (H): ICD-10-CM

## 2025-06-10 NOTE — TELEPHONE ENCOUNTER
Last Written Prescription:   Disp Refills Start End BASIM   CHENG MURDOCK, 100 UNIT/ML SOPN 15 mL 1 2/19/2025 -- No   Sig: INJECT 35 UNITS SUBCUTANEOUSLY DAILY      Disp Refills Start End BASIM   insulin pen needle (BD MO U/F) 32G X 4 MM miscellaneous 100 each 1 12/5/2024 -- No   Sig: USE WITH INSULIN PEN ONCE DAILY AS DIRECTED     ----------------------  Last Visit Date:   8/22/2024  [facility]    Future Visit Date:   7/10/2025 6:00 PM (30 min)  [facility]    Arrive by:  5:45 PM   UM RETURN   [facility]   Antelmo Carrera MD     ----------------------      Refill decision: Medication unable to be refilled by RN due to: Verification - order discrepancy, clarification needed, Sig modification needed,  Abnormal labs/test:, and Overdue labs/test:  A1C        Request from pharmacy:  Requested Prescriptions   Pending Prescriptions Disp Refills    SEMGLEE (YFGN) 100 UNIT/ML SOPN [Pharmacy Med Name: SEMGLEE (YFGN) 100UNIT/ML SOPN] 15 mL 1     Sig: INJECT 35 UNITS UNDER THE SKIN ONCE DAILY.       Insulin Protocol Failed - 6/10/2025  4:31 PM        Failed - HgbA1C in past 3 or 6 months     If HgbA1C is 8 or greater, it needs to be on file within the past 3 months.  If less than 8, must be on file within the past 6 months.     Recent Labs   Lab Test 08/13/24  1346   A1C 11.3*             Failed - Medication is active on med list and the sig matches. RN to manually verify dose and sig if red X/fail.     If the protocol passes (green check), you do not need to verify med dose and sig.    A prescription matches if they are the same clinical intention.    For Example: once daily and every morning are the same.    The protocol can not identify upper and lower case letters as matching and will fail.     For Example: Take 1 tablet (50 mg) by mouth daily     TAKE 1 TABLET (50 MG) BY MOUTH DAILY    For all fails (red x), verify dose and sig.    If the refill does match what is on file, the RN can  still proceed to approve the refill request.       If they do not match, route to the appropriate provider.             Failed - Recent (6 month) or future (90 days) visit with the authorizing provider's specialty (provided they have been seen in the past 9 months)     The patient must have completed an in-person or virtual visit within the past 6 months or has a future visit scheduled within the next 90 days with the authorizing provider s specialty.  Urgent care and e-visits do not quality as an office visit for this protocol.          Failed - Chart review required     Review Chart.    Do not approve if insulin is used in a pump.  Instead, direct refill request to the patient's endocrinologist.  If the patient doesn't have an endocrinologist, then send the refill to the patient's PCP for review            Passed - Medication indicated for associated diagnosis     Medication is associated with one or more of the following diagnoses:   - Type 1 diabetes mellitus  - Type 2 diabetes mellitus  - Diabetic nephropathy; Prophylaxis  - Neuropathy due to diabetes mellitus; Prophylaxis  - Retinopathy due to diabetes mellitus; Prophylaxis  - Diabetes mellitus associated with cystic fibrosis  - Disorder of cardiovascular system; Prophylaxis - Type 1 diabetes mellitus   - Disorder of cardiovascular system; Prophylaxis - Type 2 diabetes mellitus            Passed - Patient is 18 years of age or older          BD PEN NEEDLE MO ULTRAFINE 32G X 4 MM miscellaneous [Pharmacy Med Name: BD PEN NEEDLE MO  32G X 4 MM MISC]  1     Sig: USE WITH INSULIN PEN ONCE DAILY AS DIRECTED       Diabetic Supplies Protocol Passed - 6/10/2025  4:31 PM        Passed - Medication is active on med list and the sig matches. RN to manually verify dose and sig if red X/fail.     If the protocol passes (green check), you do not need to verify med dose and sig.    A prescription matches if they are the same clinical intention.    For Example: once daily  and every morning are the same.    The protocol can not identify upper and lower case letters as matching and will fail.     For Example: Take 1 tablet (50 mg) by mouth daily     TAKE 1 TABLET (50 MG) BY MOUTH DAILY    For all fails (red x), verify dose and sig.    If the refill does match what is on file, the RN can still proceed to approve the refill request.       If they do not match, route to the appropriate provider.             Passed - Recent (12 month) or future (90 days) visit with authorizing provider's specialty (provided they have been seen in the past 15 months)     The patient must have completed an in-person or virtual visit within the past 12 months or has a future visit scheduled within the next 90 days with the authorizing provider s specialty.  Urgent care and e-visits do not qualify as an office visit for this protocol.          Passed - Medication indicated for associated diagnosis        Passed - Patient is 18 years of age or older

## 2025-06-11 RX ORDER — PEN NEEDLE, DIABETIC 31 GX5/16"
NEEDLE, DISPOSABLE MISCELLANEOUS
Qty: 100 EACH | Refills: 0 | Status: SHIPPED | OUTPATIENT
Start: 2025-06-11

## 2025-06-11 RX ORDER — INSULIN GLARGINE-YFGN 100 [IU]/ML
35 INJECTION, SOLUTION SUBCUTANEOUS DAILY
Qty: 15 ML | Refills: 0 | Status: SHIPPED | OUTPATIENT
Start: 2025-06-11

## 2025-06-16 DIAGNOSIS — L29.9 ITCHING: ICD-10-CM

## 2025-06-16 DIAGNOSIS — E78.5 HYPERLIPIDEMIA, UNSPECIFIED HYPERLIPIDEMIA TYPE: ICD-10-CM

## 2025-06-16 DIAGNOSIS — I10 PRIMARY HYPERTENSION: ICD-10-CM

## 2025-06-18 RX ORDER — HYDROXYZINE HYDROCHLORIDE 25 MG/1
TABLET, FILM COATED ORAL
Qty: 20 TABLET | Refills: 0 | Status: SHIPPED | OUTPATIENT
Start: 2025-06-18

## 2025-06-19 RX ORDER — LISINOPRIL 20 MG/1
20 TABLET ORAL DAILY
Qty: 90 TABLET | Refills: 1 | Status: SHIPPED | OUTPATIENT
Start: 2025-06-19

## 2025-06-19 RX ORDER — SIMVASTATIN 20 MG
TABLET ORAL
Qty: 30 TABLET | Refills: 0 | Status: SHIPPED | OUTPATIENT
Start: 2025-06-19

## 2025-06-19 NOTE — TELEPHONE ENCOUNTER
Last Written Prescription:  lisinopril (ZESTRIL) 20 MG tablet 90 tablet 0 3/14/2025 -- No   Sig - Route: Take 1 tablet (20 mg) by mouth daily. - Oral   Sent to pharmacy as: Lisinopril 20 MG Oral Tablet (ZESTRIL)   Class: E-Prescribe   Order: [id number]     ----------------------  Last Visit Date: 4/4/24  Future Visit Date: 0  ----------------------      Refill decision: Medication unable to be refilled by RN due to: Pt not seen within past 12 months, No FOV or FOV exceeds timeframe per protocol   and Overdue labs/test:      Last Comprehensive Metabolic Panel:  Lab Results   Component Value Date     08/13/2024    POTASSIUM 4.1 08/13/2024    CHLORIDE 105 08/13/2024    CO2 25 08/13/2024    ANIONGAP 10 08/13/2024     (H) 08/28/2024    BUN 17.0 08/13/2024    CR 0.78 08/13/2024    GFRESTIMATED >90 08/13/2024    DALE 9.8 08/13/2024           Request from pharmacy:  Requested Prescriptions   Pending Prescriptions Disp Refills    lisinopril (ZESTRIL) 20 MG tablet [Pharmacy Med Name: LISINOPRIL 20MG TABS] 90 tablet 0     Sig: TAKE ONE TABLET BY MOUTH ONCE DAILY       ACE Inhibitors (Including Combos) Protocol Failed - 6/19/2025  9:48 AM        Failed - Recent (12 month) or future (90 days) visit with authorizing provider's specialty (provided they have been seen in the past 15 months)     The patient must have completed an in-person or virtual visit within the past 12 months or has a future visit scheduled within the next 90 days with the authorizing provider s specialty.  Urgent care and e-visits do not qualify as an office visit for this protocol.          Passed - Most recent blood pressure under 140/90 in past 12 months- Clinicial or Patient Reported     BP Readings from Last 3 Encounters:   08/28/24 130/84   08/22/24 (!) 146/80   08/13/24 (!) 148/81       No data recorded            Passed - Medication is active on med list and the sig matches. RN to manually verify dose and sig if red X/fail.     If the  protocol passes (green check), you do not need to verify med dose and sig.    A prescription matches if they are the same clinical intention.    For Example: once daily and every morning are the same.    The protocol can not identify upper and lower case letters as matching and will fail.     For Example: Take 1 tablet (50 mg) by mouth daily     TAKE 1 TABLET (50 MG) BY MOUTH DAILY    For all fails (red x), verify dose and sig.    If the refill does match what is on file, the RN can still proceed to approve the refill request.       If they do not match, route to the appropriate provider.             Passed - Medication indicated for associated diagnosis     Medication is associated with one or more of the following diagnoses:     Chronic Kidney Disease (CKD)   Coronary Artery Disease (CAD)   Diabetes   Heart Failure (HF)   Hypertension (HTN)   Nephropathy   History of myocarditis   Tachycardia induced cardiomyopathy   STEMI (ST elevation myocardial infarction)   Spontaneous dissection of coronary artery   Status post percutaneous transluminal coronary angioplasty   Cardiomyopathy            Passed - Most recent GFR on file in the past 12 months >30        Passed - Patient is age 18 or older        Passed - Normal serum potassium on file in past 12 months     Recent Labs   Lab Test 08/13/24  1346   POTASSIUM 4.1

## 2025-06-19 NOTE — TELEPHONE ENCOUNTER
Last Written Prescription:  simvastatin (ZOCOR) 20 MG tablet   90 tablet 0 3/12/2025   ----------------------  Last Visit Date: 8-  Future Visit Date: 7-  ----------------------    Refill decision: Medication refilled per  Medication Refill in Ambulatory Care  policy. Kely refill, 30 days for future appt on file    Request from pharmacy:  Requested Prescriptions   Pending Prescriptions Disp Refills    simvastatin (ZOCOR) 20 MG tablet [Pharmacy Med Name: SIMVASTATIN 20MG TABS] 90 tablet 0     Sig: TAKE ONE TABLET BY MOUTH EVERY NIGHT AT BEDTIME. MUST SEE PCP FOR LAB WORK PRIOR TO ADDITIONAL REFILLS       Antihyperlipidemic agents Failed - 6/19/2025 10:13 AM        Failed - LDL on file in the past 12 months   LDL Cholesterol Calculated   Date Value Ref Range Status   01/24/2024 75 <=100 mg/dL Final   02/04/2020 77 <100 mg/dL Final     Comment:     Desirable:       <100 mg/dl     Lab Results   Component Value Date    ALT 10 02/10/2024    ALT 17 12/30/2020          Failed - Medication is active on med list and the sig matches. RN to manually verify dose and sig if red X/fail.     If the protocol passes (green check), you do not need to verify med dose and sig.    A prescription matches if they are the same clinical intention.    For Example: once daily and every morning are the same.    The protocol can not identify upper and lower case letters as matching and will fail.     For Example: Take 1 tablet (50 mg) by mouth daily     TAKE 1 TABLET (50 MG) BY MOUTH DAILY    For all fails (red x), verify dose and sig.    If the refill does match what is on file, the RN can still proceed to approve the refill request.       If they do not match, route to the appropriate provider.             Passed - Recent (12 month) or future (90 days) visit with authorizing provider's specialty (provided they have been seen in the past 15 months)     The patient must have completed an in-person or virtual visit within the past  12 months or has a future visit scheduled within the next 90 days with the authorizing provider s specialty.  Urgent care and e-visits do not qualify as an office visit for this protocol.          Passed - Patient is age 18 years or older

## 2025-07-29 DIAGNOSIS — E11.65 TYPE 2 DIABETES MELLITUS WITH HYPERGLYCEMIA, WITH LONG-TERM CURRENT USE OF INSULIN (H): ICD-10-CM

## 2025-07-29 DIAGNOSIS — Z79.4 TYPE 2 DIABETES MELLITUS WITH HYPERGLYCEMIA, WITH LONG-TERM CURRENT USE OF INSULIN (H): ICD-10-CM

## 2025-07-30 RX ORDER — INSULIN GLARGINE-YFGN 100 [IU]/ML
INJECTION, SOLUTION SUBCUTANEOUS
Qty: 15 ML | Refills: 0 | Status: SHIPPED | OUTPATIENT
Start: 2025-07-30

## 2025-08-04 ENCOUNTER — LAB (OUTPATIENT)
Dept: LAB | Facility: CLINIC | Age: 55
End: 2025-08-04
Payer: COMMERCIAL

## 2025-08-04 ENCOUNTER — OFFICE VISIT (OUTPATIENT)
Dept: INTERNAL MEDICINE | Facility: CLINIC | Age: 55
End: 2025-08-04
Payer: COMMERCIAL

## 2025-08-04 VITALS
DIASTOLIC BLOOD PRESSURE: 77 MMHG | OXYGEN SATURATION: 95 % | RESPIRATION RATE: 16 BRPM | BODY MASS INDEX: 21.77 KG/M2 | SYSTOLIC BLOOD PRESSURE: 159 MMHG | WEIGHT: 155.5 LBS | HEIGHT: 71 IN | TEMPERATURE: 97.4 F | HEART RATE: 89 BPM

## 2025-08-04 DIAGNOSIS — E78.5 HYPERLIPIDEMIA, UNSPECIFIED HYPERLIPIDEMIA TYPE: ICD-10-CM

## 2025-08-04 DIAGNOSIS — Z79.4 TYPE 2 DIABETES MELLITUS WITH HYPERGLYCEMIA, WITH LONG-TERM CURRENT USE OF INSULIN (H): ICD-10-CM

## 2025-08-04 DIAGNOSIS — E11.65 TYPE 2 DIABETES MELLITUS WITH HYPERGLYCEMIA, WITH LONG-TERM CURRENT USE OF INSULIN (H): ICD-10-CM

## 2025-08-04 DIAGNOSIS — E11.65 TYPE 2 DIABETES MELLITUS WITH HYPERGLYCEMIA, WITH LONG-TERM CURRENT USE OF INSULIN (H): Primary | ICD-10-CM

## 2025-08-04 DIAGNOSIS — M79.661 PAIN IN BOTH LOWER LEGS: ICD-10-CM

## 2025-08-04 DIAGNOSIS — I34.0 MYXOMATOUS MITRAL VALVE REGURGITATION: ICD-10-CM

## 2025-08-04 DIAGNOSIS — N52.8 OTHER MALE ERECTILE DYSFUNCTION: ICD-10-CM

## 2025-08-04 DIAGNOSIS — Z79.4 TYPE 2 DIABETES MELLITUS WITH HYPERGLYCEMIA, WITH LONG-TERM CURRENT USE OF INSULIN (H): Primary | ICD-10-CM

## 2025-08-04 DIAGNOSIS — E11.9 T2DM (TYPE 2 DIABETES MELLITUS) (H): ICD-10-CM

## 2025-08-04 DIAGNOSIS — M79.662 PAIN IN BOTH LOWER LEGS: ICD-10-CM

## 2025-08-04 DIAGNOSIS — I10 PRIMARY HYPERTENSION: ICD-10-CM

## 2025-08-04 LAB
ALBUMIN SERPL BCG-MCNC: 4 G/DL (ref 3.5–5.2)
ALP SERPL-CCNC: 63 U/L (ref 40–150)
ALT SERPL W P-5'-P-CCNC: ABNORMAL U/L
ANION GAP SERPL CALCULATED.3IONS-SCNC: 13 MMOL/L (ref 7–15)
AST SERPL W P-5'-P-CCNC: 26 U/L (ref 0–45)
ATRIAL RATE - MUSE: 78 BPM
BASOPHILS # BLD AUTO: 0.1 10E3/UL (ref 0–0.2)
BASOPHILS NFR BLD AUTO: 1 %
BILIRUB SERPL-MCNC: 0.4 MG/DL
BUN SERPL-MCNC: 15 MG/DL (ref 6–20)
CALCIUM SERPL-MCNC: 9.3 MG/DL (ref 8.8–10.4)
CHLORIDE SERPL-SCNC: 104 MMOL/L (ref 98–107)
CHOLEST SERPL-MCNC: 212 MG/DL
CK SERPL-CCNC: NORMAL U/L
CREAT SERPL-MCNC: 0.73 MG/DL (ref 0.67–1.17)
CRP SERPL-MCNC: <3 MG/L
DIASTOLIC BLOOD PRESSURE - MUSE: NORMAL MMHG
EGFRCR SERPLBLD CKD-EPI 2021: >90 ML/MIN/1.73M2
EOSINOPHIL # BLD AUTO: 0.2 10E3/UL (ref 0–0.7)
EOSINOPHIL NFR BLD AUTO: 3 %
ERYTHROCYTE [DISTWIDTH] IN BLOOD BY AUTOMATED COUNT: 12.3 % (ref 10–15)
ERYTHROCYTE [SEDIMENTATION RATE] IN BLOOD BY WESTERGREN METHOD: 7 MM/HR (ref 0–20)
EST. AVERAGE GLUCOSE BLD GHB EST-MCNC: 269 MG/DL
FASTING STATUS PATIENT QL REPORTED: ABNORMAL
FASTING STATUS PATIENT QL REPORTED: ABNORMAL
GLUCOSE SERPL-MCNC: 361 MG/DL (ref 70–99)
HBA1C MFR BLD: 11 %
HCO3 SERPL-SCNC: 20 MMOL/L (ref 22–29)
HCT VFR BLD AUTO: 43.7 % (ref 40–53)
HDLC SERPL-MCNC: 41 MG/DL
HGB BLD-MCNC: 15.2 G/DL (ref 13.3–17.7)
IMM GRANULOCYTES # BLD: 0 10E3/UL
IMM GRANULOCYTES NFR BLD: 0 %
INTERPRETATION ECG - MUSE: NORMAL
LDLC SERPL CALC-MCNC: 105 MG/DL
LYMPHOCYTES # BLD AUTO: 2.1 10E3/UL (ref 0.8–5.3)
LYMPHOCYTES NFR BLD AUTO: 28 %
MCH RBC QN AUTO: 29.7 PG (ref 26.5–33)
MCHC RBC AUTO-ENTMCNC: 34.8 G/DL (ref 31.5–36.5)
MCV RBC AUTO: 85 FL (ref 78–100)
MONOCYTES # BLD AUTO: 0.7 10E3/UL (ref 0–1.3)
MONOCYTES NFR BLD AUTO: 9 %
NEUTROPHILS # BLD AUTO: 4.5 10E3/UL (ref 1.6–8.3)
NEUTROPHILS NFR BLD AUTO: 59 %
NONHDLC SERPL-MCNC: 171 MG/DL
NRBC # BLD AUTO: 0 10E3/UL
NRBC BLD AUTO-RTO: 0 /100
P AXIS - MUSE: 65 DEGREES
PLATELET # BLD AUTO: 233 10E3/UL (ref 150–450)
POTASSIUM SERPL-SCNC: 4.3 MMOL/L (ref 3.4–5.3)
PR INTERVAL - MUSE: 172 MS
PROT SERPL-MCNC: 7 G/DL (ref 6.4–8.3)
QRS DURATION - MUSE: 94 MS
QT - MUSE: 386 MS
QTC - MUSE: 440 MS
R AXIS - MUSE: 30 DEGREES
RBC # BLD AUTO: 5.12 10E6/UL (ref 4.4–5.9)
SODIUM SERPL-SCNC: 137 MMOL/L (ref 135–145)
SYSTOLIC BLOOD PRESSURE - MUSE: NORMAL MMHG
T AXIS - MUSE: 85 DEGREES
TRIGL SERPL-MCNC: 332 MG/DL
TSH SERPL DL<=0.005 MIU/L-ACNC: 0.82 UIU/ML (ref 0.3–4.2)
VENTRICULAR RATE- MUSE: 78 BPM
WBC # BLD AUTO: 7.7 10E3/UL (ref 4–11)

## 2025-08-04 PROCEDURE — 80048 BASIC METABOLIC PNL TOTAL CA: CPT | Performed by: PATHOLOGY

## 2025-08-04 PROCEDURE — 82550 ASSAY OF CK (CPK): CPT | Performed by: PATHOLOGY

## 2025-08-04 PROCEDURE — 84443 ASSAY THYROID STIM HORMONE: CPT | Performed by: PATHOLOGY

## 2025-08-04 PROCEDURE — 80061 LIPID PANEL: CPT | Performed by: PATHOLOGY

## 2025-08-04 PROCEDURE — 99215 OFFICE O/P EST HI 40 MIN: CPT | Mod: 25 | Performed by: INTERNAL MEDICINE

## 2025-08-04 PROCEDURE — 84075 ASSAY ALKALINE PHOSPHATASE: CPT | Performed by: PATHOLOGY

## 2025-08-04 PROCEDURE — 36415 COLL VENOUS BLD VENIPUNCTURE: CPT | Performed by: PATHOLOGY

## 2025-08-04 PROCEDURE — 93000 ELECTROCARDIOGRAM COMPLETE: CPT | Performed by: INTERNAL MEDICINE

## 2025-08-04 PROCEDURE — 3046F HEMOGLOBIN A1C LEVEL >9.0%: CPT | Performed by: INTERNAL MEDICINE

## 2025-08-04 PROCEDURE — 82247 BILIRUBIN TOTAL: CPT | Performed by: PATHOLOGY

## 2025-08-04 PROCEDURE — 90480 ADMN SARSCOV2 VAC 1/ONLY CMP: CPT | Performed by: INTERNAL MEDICINE

## 2025-08-04 PROCEDURE — 86140 C-REACTIVE PROTEIN: CPT | Performed by: PATHOLOGY

## 2025-08-04 PROCEDURE — 3049F LDL-C 100-129 MG/DL: CPT | Performed by: PATHOLOGY

## 2025-08-04 PROCEDURE — 84155 ASSAY OF PROTEIN SERUM: CPT | Performed by: PATHOLOGY

## 2025-08-04 PROCEDURE — 85025 COMPLETE CBC W/AUTO DIFF WBC: CPT | Performed by: PATHOLOGY

## 2025-08-04 PROCEDURE — 3049F LDL-C 100-129 MG/DL: CPT | Performed by: INTERNAL MEDICINE

## 2025-08-04 PROCEDURE — 99000 SPECIMEN HANDLING OFFICE-LAB: CPT | Performed by: PATHOLOGY

## 2025-08-04 PROCEDURE — 91320 SARSCV2 VAC 30MCG TRS-SUC IM: CPT | Performed by: INTERNAL MEDICINE

## 2025-08-04 PROCEDURE — 3077F SYST BP >= 140 MM HG: CPT | Performed by: INTERNAL MEDICINE

## 2025-08-04 PROCEDURE — 3046F HEMOGLOBIN A1C LEVEL >9.0%: CPT | Performed by: PATHOLOGY

## 2025-08-04 PROCEDURE — 85652 RBC SED RATE AUTOMATED: CPT | Performed by: PATHOLOGY

## 2025-08-04 PROCEDURE — 83036 HEMOGLOBIN GLYCOSYLATED A1C: CPT | Performed by: FAMILY MEDICINE

## 2025-08-04 PROCEDURE — 82570 ASSAY OF URINE CREATININE: CPT | Performed by: INTERNAL MEDICINE

## 2025-08-04 PROCEDURE — 82040 ASSAY OF SERUM ALBUMIN: CPT | Performed by: PATHOLOGY

## 2025-08-04 PROCEDURE — 3078F DIAST BP <80 MM HG: CPT | Performed by: INTERNAL MEDICINE

## 2025-08-04 PROCEDURE — 84450 TRANSFERASE (AST) (SGOT): CPT | Performed by: PATHOLOGY

## 2025-08-04 RX ORDER — SIMVASTATIN 20 MG
TABLET ORAL
Qty: 90 TABLET | Refills: 3 | Status: SHIPPED | OUTPATIENT
Start: 2025-08-04

## 2025-08-04 RX ORDER — LISINOPRIL 40 MG/1
40 TABLET ORAL DAILY
Qty: 90 TABLET | Refills: 3 | Status: SHIPPED | OUTPATIENT
Start: 2025-08-04

## 2025-08-04 RX ORDER — METFORMIN HYDROCHLORIDE 500 MG/1
TABLET, EXTENDED RELEASE ORAL
Qty: 360 TABLET | Refills: 3 | Status: SHIPPED | OUTPATIENT
Start: 2025-08-04

## 2025-08-04 RX ORDER — TADALAFIL 20 MG/1
20 TABLET ORAL EVERY 24 HOURS
Qty: 13 TABLET | Refills: 11 | Status: SHIPPED | OUTPATIENT
Start: 2025-08-04

## 2025-08-05 ENCOUNTER — RESULTS FOLLOW-UP (OUTPATIENT)
Dept: INTERNAL MEDICINE | Facility: CLINIC | Age: 55
End: 2025-08-05
Payer: COMMERCIAL

## 2025-08-05 ENCOUNTER — PATIENT OUTREACH (OUTPATIENT)
Dept: CARE COORDINATION | Facility: CLINIC | Age: 55
End: 2025-08-05
Payer: COMMERCIAL

## 2025-08-05 ENCOUNTER — TELEPHONE (OUTPATIENT)
Dept: FAMILY MEDICINE | Facility: CLINIC | Age: 55
End: 2025-08-05
Payer: COMMERCIAL

## 2025-08-05 LAB
CREAT UR-MCNC: 65.1 MG/DL
MICROALBUMIN UR-MCNC: 221 MG/L
MICROALBUMIN/CREAT UR: 339.48 MG/G CR (ref 0–17)

## 2025-08-05 RX ORDER — ORAL SEMAGLUTIDE 3 MG/1
3 TABLET ORAL DAILY
Qty: 90 TABLET | Refills: 3 | Status: SHIPPED | OUTPATIENT
Start: 2025-08-05

## 2025-08-07 ENCOUNTER — PATIENT OUTREACH (OUTPATIENT)
Dept: CARE COORDINATION | Facility: CLINIC | Age: 55
End: 2025-08-07
Payer: COMMERCIAL

## 2025-08-20 ENCOUNTER — OFFICE VISIT (OUTPATIENT)
Dept: FAMILY MEDICINE | Facility: CLINIC | Age: 55
End: 2025-08-20
Payer: COMMERCIAL

## 2025-08-20 VITALS
SYSTOLIC BLOOD PRESSURE: 159 MMHG | HEIGHT: 71 IN | TEMPERATURE: 97.6 F | RESPIRATION RATE: 16 BRPM | WEIGHT: 155.6 LBS | HEART RATE: 94 BPM | OXYGEN SATURATION: 97 % | DIASTOLIC BLOOD PRESSURE: 75 MMHG | BODY MASS INDEX: 21.78 KG/M2

## 2025-08-20 DIAGNOSIS — R07.9 CHEST PAIN, UNSPECIFIED TYPE: ICD-10-CM

## 2025-08-20 DIAGNOSIS — Z79.4 TYPE 2 DIABETES MELLITUS WITH OTHER SPECIFIED COMPLICATION, WITH LONG-TERM CURRENT USE OF INSULIN (H): Primary | ICD-10-CM

## 2025-08-20 DIAGNOSIS — I10 BENIGN ESSENTIAL HYPERTENSION: ICD-10-CM

## 2025-08-20 DIAGNOSIS — E11.65 TYPE 2 DIABETES MELLITUS WITH HYPERGLYCEMIA, WITH LONG-TERM CURRENT USE OF INSULIN (H): ICD-10-CM

## 2025-08-20 DIAGNOSIS — Z79.4 TYPE 2 DIABETES MELLITUS WITH HYPERGLYCEMIA, WITH LONG-TERM CURRENT USE OF INSULIN (H): ICD-10-CM

## 2025-08-20 DIAGNOSIS — N52.8 OTHER MALE ERECTILE DYSFUNCTION: ICD-10-CM

## 2025-08-20 DIAGNOSIS — E11.69 TYPE 2 DIABETES MELLITUS WITH OTHER SPECIFIED COMPLICATION, WITH LONG-TERM CURRENT USE OF INSULIN (H): Primary | ICD-10-CM

## 2025-08-20 DIAGNOSIS — I34.0 MYXOMATOUS MITRAL VALVE REGURGITATION: ICD-10-CM

## 2025-08-20 RX ORDER — AMLODIPINE BESYLATE 5 MG/1
5 TABLET ORAL DAILY
Qty: 90 TABLET | Refills: 3 | Status: SHIPPED | OUTPATIENT
Start: 2025-08-20

## 2025-08-21 ENCOUNTER — PATIENT OUTREACH (OUTPATIENT)
Dept: CARE COORDINATION | Facility: CLINIC | Age: 55
End: 2025-08-21
Payer: COMMERCIAL

## 2025-08-25 ENCOUNTER — PATIENT OUTREACH (OUTPATIENT)
Dept: CARE COORDINATION | Facility: CLINIC | Age: 55
End: 2025-08-25
Payer: COMMERCIAL

## 2025-08-30 DIAGNOSIS — E11.9 TYPE 2 DIABETES MELLITUS WITHOUT COMPLICATION, UNSPECIFIED WHETHER LONG TERM INSULIN USE (H): ICD-10-CM

## 2025-08-30 DIAGNOSIS — L29.9 ITCHING: ICD-10-CM

## 2025-09-04 RX ORDER — PEN NEEDLE, DIABETIC 31 GX5/16"
NEEDLE, DISPOSABLE MISCELLANEOUS
Qty: 100 EACH | Refills: 1 | Status: SHIPPED | OUTPATIENT
Start: 2025-09-04

## 2025-09-04 RX ORDER — HYDROXYZINE HYDROCHLORIDE 25 MG/1
25 TABLET, FILM COATED ORAL EVERY 8 HOURS PRN
Qty: 20 TABLET | Refills: 0 | Status: SHIPPED | OUTPATIENT
Start: 2025-09-04

## (undated) DEVICE — BLADE KNIFE SURG 10 371110

## (undated) DEVICE — TUBING SUCTION 12"X1/4" N612

## (undated) DEVICE — TOURNIQUET CUFF 30" REPRO BLUE 60-7070-105

## (undated) DEVICE — SOL WATER IRRIG 1000ML BOTTLE 2F7114

## (undated) DEVICE — SOL WATER IRRIG 500ML BOTTLE 2F7113

## (undated) DEVICE — STRAP KNEE/BODY 31143004

## (undated) DEVICE — ESU GROUND PAD ADULT W/CORD E7507

## (undated) DEVICE — GOWN IMPERVIOUS 2XL BLUE

## (undated) DEVICE — SU ETHILON 3-0 PS-1 18" 1663H

## (undated) DEVICE — GOWN XLG DISP 9545

## (undated) DEVICE — SOL NACL 0.9% IRRIG 1000ML BOTTLE 2F7124

## (undated) DEVICE — PACK LOWER EXTREMITY RIVERSIDE SOP32LEFSX

## (undated) DEVICE — SUCTION MANIFOLD NEPTUNE 2 SYS 1 PORT 702-025-000

## (undated) DEVICE — LINEN ORTHO PACK 5446

## (undated) DEVICE — KIT ENDO TURNOVER/PROCEDURE CARRY-ON 101822

## (undated) DEVICE — KIT ENDO FIRST STEP DISINFECTANT 200ML W/POUCH EP-4

## (undated) DEVICE — SNARE CAPIVATOR ROUND COLD SNR BX10 M00561101

## (undated) DEVICE — SPECIMEN CONTAINER 3OZ W/FORMALIN 59901

## (undated) DEVICE — GLOVE BIOGEL PI MICRO INDICATOR UNDERGLOVE SZ 8.0 48980

## (undated) DEVICE — GLOVE BIOGEL PI ULTRATOUCH G SZ 7.5 42175

## (undated) RX ORDER — HYDROCODONE BITARTRATE AND ACETAMINOPHEN 5; 325 MG/1; MG/1
TABLET ORAL
Status: DISPENSED
Start: 2024-08-28

## (undated) RX ORDER — BUPIVACAINE HYDROCHLORIDE 2.5 MG/ML
INJECTION, SOLUTION EPIDURAL; INFILTRATION; INTRACAUDAL
Status: DISPENSED
Start: 2024-08-28

## (undated) RX ORDER — FENTANYL CITRATE 50 UG/ML
INJECTION, SOLUTION INTRAMUSCULAR; INTRAVENOUS
Status: DISPENSED
Start: 2024-08-28

## (undated) RX ORDER — CEFAZOLIN SODIUM/WATER 2 G/20 ML
SYRINGE (ML) INTRAVENOUS
Status: DISPENSED
Start: 2024-08-28